# Patient Record
Sex: MALE | Race: BLACK OR AFRICAN AMERICAN | Employment: UNEMPLOYED | URBAN - METROPOLITAN AREA
[De-identification: names, ages, dates, MRNs, and addresses within clinical notes are randomized per-mention and may not be internally consistent; named-entity substitution may affect disease eponyms.]

---

## 2020-03-07 ENCOUNTER — HOSPITAL ENCOUNTER (EMERGENCY)
Age: 55
Discharge: HOME | End: 2020-03-07
Attending: EMERGENCY MEDICINE
Payer: MEDICARE

## 2020-03-07 VITALS
HEART RATE: 87 BPM | SYSTOLIC BLOOD PRESSURE: 124 MMHG | TEMPERATURE: 97.3 F | DIASTOLIC BLOOD PRESSURE: 82 MMHG | RESPIRATION RATE: 16 BRPM | OXYGEN SATURATION: 100 %

## 2020-03-07 PROCEDURE — 99283 EMERGENCY DEPT VISIT LOW MDM: CPT

## 2020-03-07 NOTE — ED PROVIDER NOTES
[facility] ED  Emergency Department Encounter  EmergencyMedicine Resident     Pt Nayan Allen  MRN: [id number]  [place] 1965  Date of evaluation: 3/7/20  PCP:  No primary care provider on file. CHIEF COMPLAINT       Chief Complaint   Patient presents with    Other     Pt to ED room 3 with c/o wanting ring cut off finger. Pt states he has been trying for a year to remove the ring. HISTORY OF PRESENT ILLNESS  (Location/Symptom, Timing/Onset, Context/Setting, Quality, Duration, Modifying Factors, Severity.)      Romero Taylor is a 48 y.o. male who presents with encounter for ring removal.  Patient has 2 rings on his left ring finger that he has been unable to remove for a year. No worsening pain today intact sensation of the finger, patient presents only to have her rings removed. No other complaints. PAST MEDICAL / SURGICAL / SOCIAL / FAMILY HISTORY      has a past medical history of Fatty liver and GERD (gastroesophageal reflux disease). has a past surgical history that includes Hand surgery and Urethra surgery.     Social History     Socioeconomic History    Marital status:      Spouse name: Not on file    Number of children: Not on file    Years of education: Not on file    Highest education level: Not on file   Occupational History    Not on file   Social Needs    Financial resource strain: Not on file    Food insecurity     Worry: Not on file     Inability: Not on file    Transportation needs     Medical: Not on file     Non-medical: Not on file   Tobacco Use    Smoking status: Current Every Day Smoker     Packs/day: 1.00     Years: 25.00     Pack years: 25.00     Types: Cigarettes    Smokeless tobacco: Never Used   Substance and Sexual Activity    Alcohol use: No     Alcohol/week: 0.0 standard drinks    Drug use: No    Sexual activity: Never   Lifestyle    Physical activity     Days per week: Not on file     Minutes per session: Not on file  Stress: Not on file   Relationships    Social connections     Talks on phone: Not on file     Gets together: Not on file     Attends [employer] service: Not on file     Active member of club or organization: Not on file     Attends meetings of clubs or organizations: Not on file     Relationship status: Not on file    Intimate partner violence     Fear of current or ex partner: Not on file     Emotionally abused: Not on file     Physically abused: Not on file     Forced sexual activity: Not on file   Other Topics Concern    Not on file   Social History Narrative    Not on file       No family history on file. Allergies:  Aspirin and Ciprofloxacin    Home Medications:  Prior to Admission medications    Medication Sig Start Date End Date Taking? Authorizing Provider   ondansetron (ZOFRAN ODT) 4 MG disintegrating tablet Take 1 tablet by mouth every 8 hours as needed for Nausea 10/18/16   Marilyn Fox DO   omeprazole (PRILOSEC) 20 MG capsule Take 1 capsule by mouth Daily 5/5/16   Kristy Delgado MD       REVIEW OF SYSTEMS    (2-9 systems for level 4, 10 or more for level 5)      Review of Systems   Constitutional: Negative. Musculoskeletal:        Left ring finger pain due to stuck rings. PHYSICAL EXAM   (up to 7 for level 4, 8 or more for level 5)      INITIAL VITALS:   /82   Pulse 87   Temp 97.3 °F (36.3 °C)   Resp 16   SpO2 100%     Physical Exam  Constitutional:       Appearance: Normal appearance. Cardiovascular:      Rate and Rhythm: Normal rate. Pulmonary:      Effort: No respiratory distress. Musculoskeletal:      Comments: Left hand: Patient has 2 steel steel rings that he is unable to remove. The rings are loose but he is unable to advance them beyond his PIP joint requesting removal today. Intact sensation and capillary refill distal finger. Neurological:      Mental Status: He is alert. DIFFERENTIAL  DIAGNOSIS     PLAN (LABS / IMAGING / EKG):   No orders of the defined types were placed in this encounter. MEDICATIONS ORDERED:  No orders of the defined types were placed in this encounter. DIAGNOSTIC RESULTS / EMERGENCY DEPARTMENT COURSE / MDM     LABS:  No results found for this visit on 03/07/20. RADIOLOGY:  None    EKG  None    All EKG's are interpreted by the Emergency Department Physician who either signs or Co-signs this chart in the absence of a cardiologist.    EMERGENCY DEPARTMENT COURSE:  Patient is a 53-year-old male here for ring removal.  Rings have been stuck on his left ring finger for over 1 year. No pain rings are loose he has been unable to cut them or remove them at home presents today for removal.  Otherwise patient has no other complaints. Rings were removed with ring cutter without difficulty. PROCEDURES:  Ring removal procedure    Indication: Rings able to be removed by patient. Procedure: The patient was placed in the appropriate position and anesthesia not needed for this procedure, utilizing ring cutting kit with appropriate techniques both rings were removed from patient's left ring finger. The patient tolerated the procedure well. Complications: None        CONSULTS:  None    CRITICAL CARE:  None    FINAL IMPRESSION      1.  Finger pain, left          DISPOSITION / PLAN     DISPOSITION  dc      PATIENT REFERRED TO:  [facility]  [address] [address]  [phone]    If symptoms worsen      DISCHARGE MEDICATIONS:  New Prescriptions    No medications on file       Shanti Kowalski DO  Emergency Medicine Resident    (Please note that portions of thisnote were completed with a voice recognition program.  Efforts were made to edit the dictations but occasionally words are mis-transcribed.)        Shanti Kowalski DO  03/07/20 1021

## 2020-10-28 ENCOUNTER — HOSPITAL ENCOUNTER (OUTPATIENT)
Age: 55
Setting detail: SPECIMEN
Discharge: HOME | End: 2020-10-28
Payer: MEDICARE

## 2020-10-28 LAB
ABSOLUTE EOS #: 0.22 K/UL (ref 0–0.44)
ABSOLUTE IMMATURE GRANULOCYTE: <0.03 K/UL (ref 0–0.3)
ABSOLUTE LYMPH #: 3.41 K/UL (ref 1.1–3.7)
ABSOLUTE MONO #: 0.95 K/UL (ref 0.1–1.2)
ALBUMIN SERPL-MCNC: 4.2 G/DL (ref 3.5–5.2)
ALBUMIN/GLOBULIN RATIO: 1.4 (ref 1–2.5)
ALP BLD-CCNC: 97 U/L (ref 40–129)
ALT SERPL-CCNC: 18 U/L (ref 5–41)
ANION GAP SERPL CALCULATED.3IONS-SCNC: 11 MMOL/L (ref 9–17)
AST SERPL-CCNC: 23 U/L
BASOPHILS # BLD: 1 % (ref 0–2)
BASOPHILS ABSOLUTE: 0.07 K/UL (ref 0–0.2)
BILIRUB SERPL-MCNC: 0.44 MG/DL (ref 0.3–1.2)
BUN BLDV-MCNC: 12 MG/DL (ref 6–20)
BUN/CREAT BLD: NORMAL (ref 9–20)
CALCIUM SERPL-MCNC: 9.8 MG/DL (ref 8.6–10.4)
CHLORIDE BLD-SCNC: 105 MMOL/L (ref 98–107)
CHOLESTEROL/HDL RATIO: 3.4
CHOLESTEROL: 158 MG/DL
CO2: 23 MMOL/L (ref 20–31)
CREAT SERPL-MCNC: 1.06 MG/DL (ref 0.7–1.2)
DIFFERENTIAL TYPE: NORMAL
EOSINOPHILS RELATIVE PERCENT: 2 % (ref 1–4)
ESTIMATED AVERAGE GLUCOSE: 128 MG/DL
GFR AFRICAN AMERICAN: >60 ML/MIN
GFR NON-AFRICAN AMERICAN: >60 ML/MIN
GFR SERPL CREATININE-BSD FRML MDRD: NORMAL ML/MIN/{1.73_M2}
GFR SERPL CREATININE-BSD FRML MDRD: NORMAL ML/MIN/{1.73_M2}
GLUCOSE BLD-MCNC: 90 MG/DL (ref 70–99)
HBA1C MFR BLD: 6.1 % (ref 4–6)
HCT VFR BLD CALC: 44.2 % (ref 40.7–50.3)
HDLC SERPL-MCNC: 46 MG/DL
HEMOGLOBIN: 14.3 G/DL (ref 13–17)
IMMATURE GRANULOCYTES: 0 %
LDL CHOLESTEROL: 95 MG/DL (ref 0–130)
LYMPHOCYTES # BLD: 36 % (ref 24–43)
MCH RBC QN AUTO: 27.5 PG (ref 25.2–33.5)
MCHC RBC AUTO-ENTMCNC: 32.4 G/DL (ref 28.4–34.8)
MCV RBC AUTO: 85 FL (ref 82.6–102.9)
MONOCYTES # BLD: 10 % (ref 3–12)
NRBC AUTOMATED: 0 PER 100 WBC
PDW BLD-RTO: 13.7 % (ref 11.8–14.4)
PLATELET # BLD: 413 K/UL (ref 138–453)
PLATELET ESTIMATE: NORMAL
PMV BLD AUTO: 9.7 FL (ref 8.1–13.5)
POTASSIUM SERPL-SCNC: 4.7 MMOL/L (ref 3.7–5.3)
PROSTATE SPECIFIC ANTIGEN: 0.92 UG/L
RBC # BLD: 5.2 M/UL (ref 4.21–5.77)
RBC # BLD: NORMAL 10*6/UL
SEG NEUTROPHILS: 51 % (ref 36–65)
SEGMENTED NEUTROPHILS ABSOLUTE COUNT: 4.92 K/UL (ref 1.5–8.1)
SODIUM BLD-SCNC: 139 MMOL/L (ref 135–144)
TOTAL PROTEIN: 7.3 G/DL (ref 6.4–8.3)
TRIGL SERPL-MCNC: 85 MG/DL
TSH SERPL DL<=0.05 MIU/L-ACNC: 2.26 MIU/L (ref 0.3–5)
VITAMIN D 25-HYDROXY: 41.8 NG/ML (ref 30–100)
VLDLC SERPL CALC-MCNC: NORMAL MG/DL (ref 1–30)
WBC # BLD: 9.6 K/UL (ref 3.5–11.3)
WBC # BLD: NORMAL 10*3/UL

## 2020-10-29 ENCOUNTER — HOSPITAL ENCOUNTER (OUTPATIENT)
Age: 55
Setting detail: SPECIMEN
Discharge: HOME | End: 2020-10-29
Payer: MEDICARE

## 2020-10-29 LAB
DATE, STOOL #1: NORMAL
DATE, STOOL #2: NORMAL
DATE, STOOL #3: NORMAL
HEMOCCULT SP1 STL QL: NEGATIVE
HEMOCCULT SP2 STL QL: NEGATIVE
HEMOCCULT SP3 STL QL: NEGATIVE
TIME, STOOL #1: NORMAL
TIME, STOOL #2: NORMAL
TIME, STOOL #3: NORMAL

## 2021-03-20 ENCOUNTER — APPOINTMENT (OUTPATIENT)
Dept: GENERAL RADIOLOGY | Age: 56
End: 2021-03-20
Payer: MEDICARE

## 2021-03-20 ENCOUNTER — HOSPITAL ENCOUNTER (EMERGENCY)
Age: 56
Discharge: HOME | End: 2021-03-20
Attending: EMERGENCY MEDICINE
Payer: MEDICARE

## 2021-03-20 VITALS
OXYGEN SATURATION: 96 % | DIASTOLIC BLOOD PRESSURE: 80 MMHG | RESPIRATION RATE: 14 BRPM | TEMPERATURE: 97.7 F | SYSTOLIC BLOOD PRESSURE: 114 MMHG | HEART RATE: 74 BPM

## 2021-03-20 DIAGNOSIS — R07.9 CHEST PAIN, UNSPECIFIED TYPE: Primary | ICD-10-CM

## 2021-03-20 LAB
ABSOLUTE EOS #: 0.11 K/UL (ref 0–0.4)
ABSOLUTE IMMATURE GRANULOCYTE: 0 K/UL (ref 0–0.3)
ABSOLUTE LYMPH #: 5.28 K/UL (ref 1–4.8)
ABSOLUTE MONO #: 0.33 K/UL (ref 0.1–0.8)
ANION GAP SERPL CALCULATED.3IONS-SCNC: 14 MMOL/L (ref 9–17)
ATYPICAL LYMPHOCYTE ABSOLUTE COUNT: 0.22 K/UL
ATYPICAL LYMPHOCYTES: 2 %
BASOPHILS # BLD: 0 % (ref 0–2)
BASOPHILS ABSOLUTE: 0 K/UL (ref 0–0.2)
BUN BLDV-MCNC: 16 MG/DL (ref 6–20)
BUN/CREAT BLD: NORMAL (ref 9–20)
CALCIUM SERPL-MCNC: 9.7 MG/DL (ref 8.6–10.4)
CHLORIDE BLD-SCNC: 103 MMOL/L (ref 98–107)
CO2: 23 MMOL/L (ref 20–31)
CREAT SERPL-MCNC: 0.93 MG/DL (ref 0.7–1.2)
DIFFERENTIAL TYPE: ABNORMAL
EOSINOPHILS RELATIVE PERCENT: 1 % (ref 1–4)
GFR AFRICAN AMERICAN: >60 ML/MIN
GFR NON-AFRICAN AMERICAN: >60 ML/MIN
GFR SERPL CREATININE-BSD FRML MDRD: NORMAL ML/MIN/{1.73_M2}
GFR SERPL CREATININE-BSD FRML MDRD: NORMAL ML/MIN/{1.73_M2}
GLUCOSE BLD-MCNC: 94 MG/DL (ref 70–99)
HCT VFR BLD CALC: 47.5 % (ref 40.7–50.3)
HEMOGLOBIN: 15.3 G/DL (ref 13–17)
IMMATURE GRANULOCYTES: 0 %
LYMPHOCYTES # BLD: 48 % (ref 24–44)
MCH RBC QN AUTO: 27.8 PG (ref 25.2–33.5)
MCHC RBC AUTO-ENTMCNC: 32.2 G/DL (ref 28.4–34.8)
MCV RBC AUTO: 86.2 FL (ref 82.6–102.9)
MONOCYTES # BLD: 3 % (ref 1–7)
MORPHOLOGY: NORMAL
NRBC AUTOMATED: 0 PER 100 WBC
PDW BLD-RTO: 13.4 % (ref 11.8–14.4)
PLATELET # BLD: ABNORMAL K/UL (ref 138–453)
PLATELET ESTIMATE: ABNORMAL
PLATELET, FLUORESCENCE: NORMAL K/UL (ref 138–453)
PMV BLD AUTO: ABNORMAL FL (ref 8.1–13.5)
POTASSIUM SERPL-SCNC: 4.1 MMOL/L (ref 3.7–5.3)
RBC # BLD: 5.51 M/UL (ref 4.21–5.77)
RBC # BLD: ABNORMAL 10*6/UL
SEG NEUTROPHILS: 46 % (ref 36–66)
SEGMENTED NEUTROPHILS ABSOLUTE COUNT: 5.06 K/UL (ref 1.8–7.7)
SODIUM BLD-SCNC: 140 MMOL/L (ref 135–144)
TROPONIN INTERP: NORMAL
TROPONIN INTERP: NORMAL
TROPONIN T: NORMAL NG/ML
TROPONIN T: NORMAL NG/ML
TROPONIN, HIGH SENSITIVITY: 6 NG/L (ref 0–22)
TROPONIN, HIGH SENSITIVITY: 7 NG/L (ref 0–22)
WBC # BLD: 11 K/UL (ref 3.5–11.3)
WBC # BLD: ABNORMAL 10*3/UL

## 2021-03-20 PROCEDURE — 85055 RETICULATED PLATELET ASSAY: CPT

## 2021-03-20 PROCEDURE — 84484 ASSAY OF TROPONIN QUANT: CPT

## 2021-03-20 PROCEDURE — 85025 COMPLETE CBC W/AUTO DIFF WBC: CPT

## 2021-03-20 PROCEDURE — 6360000002 HC RX W HCPCS: Performed by: STUDENT IN AN ORGANIZED HEALTH CARE EDUCATION/TRAINING PROGRAM

## 2021-03-20 PROCEDURE — 80048 BASIC METABOLIC PNL TOTAL CA: CPT

## 2021-03-20 PROCEDURE — 93005 ELECTROCARDIOGRAM TRACING: CPT | Performed by: STUDENT IN AN ORGANIZED HEALTH CARE EDUCATION/TRAINING PROGRAM

## 2021-03-20 PROCEDURE — 71046 X-RAY EXAM CHEST 2 VIEWS: CPT

## 2021-03-20 PROCEDURE — 99283 EMERGENCY DEPT VISIT LOW MDM: CPT

## 2021-03-20 PROCEDURE — 96374 THER/PROPH/DIAG INJ IV PUSH: CPT

## 2021-03-20 RX ORDER — KETOROLAC TROMETHAMINE 15 MG/ML
15 INJECTION, SOLUTION INTRAMUSCULAR; INTRAVENOUS ONCE
Status: COMPLETED | OUTPATIENT
Start: 2021-03-20 | End: 2021-03-20

## 2021-03-20 RX ADMIN — KETOROLAC TROMETHAMINE 15 MG: 15 INJECTION, SOLUTION INTRAMUSCULAR; INTRAVENOUS at 03:37

## 2021-03-20 ASSESSMENT — PAIN SCALES - GENERAL: PAINLEVEL_OUTOF10: 5

## 2021-03-20 ASSESSMENT — ENCOUNTER SYMPTOMS
RHINORRHEA: 0
COUGH: 0
ABDOMINAL PAIN: 0
EYE PAIN: 0
DIARRHEA: 0
SHORTNESS OF BREATH: 1
EYE DISCHARGE: 0
NAUSEA: 0
VOMITING: 0
CONSTIPATION: 0
CHEST TIGHTNESS: 1

## 2021-03-20 ASSESSMENT — PAIN DESCRIPTION - DESCRIPTORS: DESCRIPTORS: SORE

## 2021-03-20 ASSESSMENT — PAIN DESCRIPTION - PAIN TYPE: TYPE: ACUTE PAIN

## 2021-03-20 NOTE — ED NOTES
Writer called cab for transportation back to [place] [address] per patient request utilizing [employer] due to [employer].       Carolee Manrique, TIESHA  03/20/21 6610

## 2021-03-20 NOTE — ED NOTES
Bed: 16  Expected date: 3/20/21  Expected time: 1:04 AM  Means of arrival: [employer]  Comments:  MADISON Boucher 1958, RN  03/20/21 8977

## 2021-03-20 NOTE — ED PROVIDER NOTES
[facility] [facility]  Emergency Department  Faculty Attestation       I performed a history and physical examination of the patient and discussed management with the resident. I reviewed the residents note and agree with the documented findings including all diagnostic interpretations and plan of care. Any areas of disagreement are noted on the chart. I was personally present for the key portions of any procedures. I have documented in the chart those procedures where I was not present during the key portions. I have reviewed the emergency nurses triage note. I agree with the chief complaint, past medical history, past surgical history, allergies, medications, social and family history as documented unless otherwise noted below. Documentation of the HPI, Physical Exam and Medical Decision Making performed by scribmartha is based on my personal performance of the HPI, PE and MDM. For Physician Assistant/ Nurse Practitioner cases/documentation I have personally evaluated this patient and have completed at least one if not all key elements of the E/M (history, physical exam, and MDM). Additional findings are as noted. Pertinent Comments     Primary Care Physician: No primary care provider on file. ED Triage Vitals   BP Temp Temp Source Pulse Resp SpO2 Height Weight   03/20/21 0111 03/20/21 0111 03/20/21 0111 03/20/21 0111 03/20/21 0111 03/20/21 0112 -- --   114/80 97.7 °F (36.5 °C) Oral 74 14 96 %          History/Physical: This is a 50 y.o. male who presents to the Emergency Department with complaint of a pinching chest pain sensation, associate with some shortness of breath, no other complaints at this time. Now just feels sore. On exam well-appearing in no significant distress. NC/AT. Heart sounds regular with no rubs murmurs or gallops. Lungs clear to auscultiation bilaterally. Abdomen soft nontender. Alert and oriented moving all extremities equally. No extremity edema.     MDM/Plan: Chest pain. Cardiac work-up, and likely discharge home if work-up negative given low heart score.     EKG Interpretation    Interpreted by emergency department physician    Rhythm: normal sinus  Rate: normal  Axis: normal  Ectopy: none  Conduction: normal  ST Segments: normal, no acute infarction evident  T Waves: normal  Q Waves: none    EKG Interpretation:  Normal EKG      CRITICAL CARE: None     Nelsy Diehl MD  Attending Emergency Physician         Nelsy Diehl MD  03/23/21 7711

## 2021-03-20 NOTE — ED PROVIDER NOTES
Start Date End Date Taking? Authorizing Provider   ondansetron (ZOFRAN ODT) 4 MG disintegrating tablet Take 1 tablet by mouth every 8 hours as needed for Nausea 10/18/16   Ute Lemus DO   omeprazole (PRILOSEC) 20 MG capsule Take 1 capsule by mouth Daily 5/5/16   Yumiko Gurrola MD       REVIEW OFSYSTEMS    (2-9 systems for level 4, 10 or more for level 5)      Review of Systems   Constitutional: Negative for chills and fever. HENT: Negative for congestion, ear pain and rhinorrhea. Eyes: Negative for pain and discharge. Respiratory: Positive for chest tightness and shortness of breath. Negative for cough. Cardiovascular: Positive for chest pain. Negative for palpitations. Gastrointestinal: Negative for abdominal pain, constipation, diarrhea, nausea and vomiting. Genitourinary: Negative for difficulty urinating and hematuria. Musculoskeletal: Negative for arthralgias and myalgias. Skin: Negative for rash and wound. Neurological: Positive for light-headedness. Negative for headaches. PHYSICAL EXAM   (up to 7 for level 4, 8 or more forlevel 5)      INITIAL VITALS:   Vitals:    03/20/21 0112   BP:    Pulse:    Resp:    Temp:    SpO2: 96%        Physical Exam  Vitals signs and nursing note reviewed. Constitutional:       General: He is not in acute distress. Appearance: He is not ill-appearing. HENT:      Head: Normocephalic and atraumatic. Nose: Nose normal.      Mouth/Throat:      Mouth: Mucous membranes are moist.      Pharynx: Oropharynx is clear. Eyes:      Pupils: Pupils are equal, round, and reactive to light. Neck:      Musculoskeletal: Normal range of motion. Cardiovascular:      Rate and Rhythm: Normal rate and regular rhythm. Heart sounds: No murmur. No friction rub. No gallop. Pulmonary:      Effort: Pulmonary effort is normal. No respiratory distress. Breath sounds: Normal breath sounds. No decreased breath sounds, wheezing or rhonchi. Chest:      Chest wall: No mass, deformity or tenderness. Abdominal:      General: Abdomen is flat. There is no distension. Palpations: Abdomen is soft. Tenderness: There is no abdominal tenderness. Musculoskeletal:      Right lower leg: No edema. Left lower leg: No edema. Skin:     General: Skin is warm and dry. Neurological:      Mental Status: He is alert and oriented to person, place, and time. Psychiatric:         Mood and Affect: Mood normal.         Behavior: Behavior normal.         DIFFERENTIAL  DIAGNOSIS       DDX: ACS versus NSTEMI versus STEMI versus URI versus COPD exacerbation versus GERD    Initial MDM/Plan: 50 y.o. male who presents with acute onset of chest pain or shortness of breath. GCS 15, vital signs stable upon arrival.  Remained stable throughout emergency department stay. Did receive 3 and 24 mg of aspirin in route per EMS. No other medications given. CBC showing no acute abnormalities, BMP showing no acute abnormalities, chest x-ray showing COPD and emphysema with known smoking history. Initial troponin 6. Given toradol for symptomatic relief. Repeat troponin 7. Heart score of 2. Patient remained vitally stable throughout emergency department stay was discharged in stable condition with plan for follow-up with primary care physician. Patient does state that he is primary care physician and does not need referral.    DIAGNOSTIC RESULTS / EMERGENCYDEPARTMENT COURSE / MDM     LABS:  Labs Reviewed   CBC WITH AUTO DIFFERENTIAL - Abnormal; Notable for the following components:       Result Value    Lymphocytes 48 (*)     Absolute Lymph # 5.28 (*)     All other components within normal limits   BASIC METABOLIC PANEL W/ REFLEX TO MG FOR LOW K   TROPONIN   TROPONIN   IMMATURE PLATELET FRACTION         RADIOLOGY:  Xr Chest (2 Vw)    Result Date: 3/20/2021  EXAMINATION: TWO XRAY VIEWS OF THE CHEST 3/20/2021 2:04 am COMPARISON: Chest two views July 6, 2016.  HISTORY: ORDERING SYSTEM PROVIDED HISTORY: chest pain and shortness of breath TECHNOLOGIST PROVIDED HISTORY: chest pain and shortness of breath Reason for Exam: CP.SOB FINDINGS: The heart is normal in size and configuration. The mediastinal contours are within normal limits. The lungs are well aerated. Diaphragm is flattened with mild increased AP diameter of the chest.  The pleural surfaces are normal and no evidence of a pleural effusion is seen. Bones and soft tissues are unremarkable. COPD/emphysema. No evidence of lung consolidation. EKG  Rate normal, normal sinus rhythm, Axis normal, no deviation noted, intervals within normal limits including AL, QRS, QT/QTc, no ST segment elevation, no ST segment depression, no T wave inversion noted. No acute abnormalities noted on EKG. All EKG's are interpreted by the Emergency Department Physicianwho either signs or Co-signs this chart in the absence of a cardiologist.    EMERGENCY DEPARTMENT COURSE:          PROCEDURES:  None    CONSULTS:  None      FINAL IMPRESSION      1.  Chest pain, unspecified type          DISPOSITION / PLAN     DISPOSITION        PATIENT REFERRED TO:  [facility]  [address] [address]  [phone]    As needed, If symptoms worsen    [address] [address] [phone]    As needed if you need to establish care      DISCHARGE MEDICATIONS:  Discharge Medication List as of 3/20/2021  4:27 AM          Marjorie Hensley DO  Emergency Medicine Resident    (Please note that portions of this note were completed with a voice recognition program.Efforts were made to edit the dictations but occasionally words are mis-transcribed.)       Marjorie Hensley DO  Resident  03/20/21 4783

## 2021-03-22 LAB
EKG ATRIAL RATE: 83 BPM
EKG P AXIS: 84 DEGREES
EKG P-R INTERVAL: 152 MS
EKG Q-T INTERVAL: 380 MS
EKG QRS DURATION: 88 MS
EKG QTC CALCULATION (BAZETT): 446 MS
EKG R AXIS: 88 DEGREES
EKG T AXIS: 64 DEGREES
EKG VENTRICULAR RATE: 83 BPM

## 2021-03-22 PROCEDURE — 93010 ELECTROCARDIOGRAM REPORT: CPT | Performed by: INTERNAL MEDICINE

## 2021-04-09 ENCOUNTER — HOSPITAL ENCOUNTER (EMERGENCY)
Age: 56
Discharge: LEFT BEFORE DISCHARGE | End: 2021-04-09
Payer: MEDICARE

## 2023-01-24 ENCOUNTER — HOSPITAL ENCOUNTER (EMERGENCY)
Age: 58
Discharge: HOME | End: 2023-01-24
Attending: EMERGENCY MEDICINE
Payer: MEDICARE

## 2023-01-24 ENCOUNTER — APPOINTMENT (OUTPATIENT)
Dept: GENERAL RADIOLOGY | Age: 58
End: 2023-01-24
Payer: MEDICARE

## 2023-01-24 ENCOUNTER — APPOINTMENT (OUTPATIENT)
Dept: CT IMAGING | Age: 58
End: 2023-01-24
Payer: MEDICARE

## 2023-01-24 VITALS
DIASTOLIC BLOOD PRESSURE: 80 MMHG | HEIGHT: 69 IN | OXYGEN SATURATION: 92 % | BODY MASS INDEX: 27.32 KG/M2 | TEMPERATURE: 97.5 F | SYSTOLIC BLOOD PRESSURE: 124 MMHG | HEART RATE: 83 BPM | RESPIRATION RATE: 20 BRPM

## 2023-01-24 DIAGNOSIS — R07.89 CHEST WALL PAIN: ICD-10-CM

## 2023-01-24 DIAGNOSIS — R10.13 ABDOMINAL PAIN, EPIGASTRIC: Primary | ICD-10-CM

## 2023-01-24 LAB
ABSOLUTE EOS #: 0.11 K/UL (ref 0–0.44)
ABSOLUTE IMMATURE GRANULOCYTE: 0.03 K/UL (ref 0–0.3)
ABSOLUTE LYMPH #: 2.38 K/UL (ref 1.1–3.7)
ABSOLUTE MONO #: 0.85 K/UL (ref 0.1–1.2)
ALBUMIN SERPL-MCNC: 4.4 G/DL (ref 3.5–5.2)
ALBUMIN/GLOBULIN RATIO: 1.4 (ref 1–2.5)
ALP BLD-CCNC: 95 U/L (ref 40–129)
ALT SERPL-CCNC: 16 U/L (ref 5–41)
ANION GAP SERPL CALCULATED.3IONS-SCNC: 12 MMOL/L (ref 9–17)
AST SERPL-CCNC: 15 U/L
BASOPHILS # BLD: 1 % (ref 0–2)
BASOPHILS ABSOLUTE: 0.07 K/UL (ref 0–0.2)
BILIRUB SERPL-MCNC: 0.5 MG/DL (ref 0.3–1.2)
BILIRUBIN DIRECT: 0.1 MG/DL
BILIRUBIN, INDIRECT: 0.4 MG/DL (ref 0–1)
BUN BLDV-MCNC: 14 MG/DL (ref 6–20)
CALCIUM SERPL-MCNC: 9.9 MG/DL (ref 8.6–10.4)
CHLORIDE BLD-SCNC: 100 MMOL/L (ref 98–107)
CO2: 24 MMOL/L (ref 20–31)
CREAT SERPL-MCNC: 1.09 MG/DL (ref 0.7–1.2)
EKG ATRIAL RATE: 84 BPM
EKG P AXIS: 82 DEGREES
EKG P-R INTERVAL: 156 MS
EKG Q-T INTERVAL: 378 MS
EKG QRS DURATION: 80 MS
EKG QTC CALCULATION (BAZETT): 446 MS
EKG R AXIS: 86 DEGREES
EKG T AXIS: 68 DEGREES
EKG VENTRICULAR RATE: 84 BPM
EOSINOPHILS RELATIVE PERCENT: 1 % (ref 1–4)
GFR SERPL CREATININE-BSD FRML MDRD: >60 ML/MIN/1.73M2
GLUCOSE BLD-MCNC: 128 MG/DL (ref 70–99)
HCT VFR BLD CALC: 49.4 % (ref 40.7–50.3)
HEMOGLOBIN: 16.1 G/DL (ref 13–17)
IMMATURE GRANULOCYTES: 0 %
LIPASE: 48 U/L (ref 13–60)
LYMPHOCYTES # BLD: 22 % (ref 24–43)
MAGNESIUM: 2 MG/DL (ref 1.6–2.6)
MCH RBC QN AUTO: 27.9 PG (ref 25.2–33.5)
MCHC RBC AUTO-ENTMCNC: 32.6 G/DL (ref 28.4–34.8)
MCV RBC AUTO: 85.6 FL (ref 82.6–102.9)
MONOCYTES # BLD: 8 % (ref 3–12)
NRBC AUTOMATED: 0 PER 100 WBC
PDW BLD-RTO: 13.8 % (ref 11.8–14.4)
PLATELET # BLD: 437 K/UL (ref 138–453)
PMV BLD AUTO: 9.1 FL (ref 8.1–13.5)
POTASSIUM SERPL-SCNC: 5.1 MMOL/L (ref 3.7–5.3)
PRO-BNP: <36 PG/ML
RBC # BLD: 5.77 M/UL (ref 4.21–5.77)
SEG NEUTROPHILS: 68 % (ref 36–65)
SEGMENTED NEUTROPHILS ABSOLUTE COUNT: 7.33 K/UL (ref 1.5–8.1)
SODIUM BLD-SCNC: 136 MMOL/L (ref 135–144)
TOTAL PROTEIN: 7.6 G/DL (ref 6.4–8.3)
TROPONIN, HIGH SENSITIVITY: 9 NG/L (ref 0–22)
TROPONIN, HIGH SENSITIVITY: 9 NG/L (ref 0–22)
WBC # BLD: 10.8 K/UL (ref 3.5–11.3)

## 2023-01-24 PROCEDURE — 6360000004 HC RX CONTRAST MEDICATION: Performed by: STUDENT IN AN ORGANIZED HEALTH CARE EDUCATION/TRAINING PROGRAM

## 2023-01-24 PROCEDURE — A4216 STERILE WATER/SALINE, 10 ML: HCPCS | Performed by: STUDENT IN AN ORGANIZED HEALTH CARE EDUCATION/TRAINING PROGRAM

## 2023-01-24 PROCEDURE — 83690 ASSAY OF LIPASE: CPT

## 2023-01-24 PROCEDURE — 83880 ASSAY OF NATRIURETIC PEPTIDE: CPT

## 2023-01-24 PROCEDURE — 85025 COMPLETE CBC W/AUTO DIFF WBC: CPT

## 2023-01-24 PROCEDURE — 96375 TX/PRO/DX INJ NEW DRUG ADDON: CPT

## 2023-01-24 PROCEDURE — 80076 HEPATIC FUNCTION PANEL: CPT

## 2023-01-24 PROCEDURE — 74177 CT ABD & PELVIS W/CONTRAST: CPT

## 2023-01-24 PROCEDURE — 2580000003 HC RX 258: Performed by: STUDENT IN AN ORGANIZED HEALTH CARE EDUCATION/TRAINING PROGRAM

## 2023-01-24 PROCEDURE — 96374 THER/PROPH/DIAG INJ IV PUSH: CPT

## 2023-01-24 PROCEDURE — 96361 HYDRATE IV INFUSION ADD-ON: CPT

## 2023-01-24 PROCEDURE — 84484 ASSAY OF TROPONIN QUANT: CPT

## 2023-01-24 PROCEDURE — 80048 BASIC METABOLIC PNL TOTAL CA: CPT

## 2023-01-24 PROCEDURE — 99285 EMERGENCY DEPT VISIT HI MDM: CPT

## 2023-01-24 PROCEDURE — 2500000003 HC RX 250 WO HCPCS: Performed by: STUDENT IN AN ORGANIZED HEALTH CARE EDUCATION/TRAINING PROGRAM

## 2023-01-24 PROCEDURE — 6360000002 HC RX W HCPCS: Performed by: STUDENT IN AN ORGANIZED HEALTH CARE EDUCATION/TRAINING PROGRAM

## 2023-01-24 PROCEDURE — 93010 ELECTROCARDIOGRAM REPORT: CPT | Performed by: INTERNAL MEDICINE

## 2023-01-24 PROCEDURE — 93005 ELECTROCARDIOGRAM TRACING: CPT | Performed by: STUDENT IN AN ORGANIZED HEALTH CARE EDUCATION/TRAINING PROGRAM

## 2023-01-24 PROCEDURE — 71046 X-RAY EXAM CHEST 2 VIEWS: CPT

## 2023-01-24 PROCEDURE — 83735 ASSAY OF MAGNESIUM: CPT

## 2023-01-24 RX ORDER — ONDANSETRON 2 MG/ML
4 INJECTION INTRAMUSCULAR; INTRAVENOUS ONCE
Status: COMPLETED | OUTPATIENT
Start: 2023-01-24 | End: 2023-01-24

## 2023-01-24 RX ORDER — 0.9 % SODIUM CHLORIDE 0.9 %
1000 INTRAVENOUS SOLUTION INTRAVENOUS ONCE
Status: COMPLETED | OUTPATIENT
Start: 2023-01-24 | End: 2023-01-24

## 2023-01-24 RX ADMIN — ONDANSETRON 4 MG: 2 INJECTION INTRAMUSCULAR; INTRAVENOUS at 03:46

## 2023-01-24 RX ADMIN — SODIUM CHLORIDE 1000 ML: 9 INJECTION, SOLUTION INTRAVENOUS at 03:58

## 2023-01-24 RX ADMIN — FAMOTIDINE 20 MG: 10 INJECTION, SOLUTION INTRAVENOUS at 03:57

## 2023-01-24 RX ADMIN — IOPAMIDOL 75 ML: 755 INJECTION, SOLUTION INTRAVENOUS at 04:09

## 2023-01-24 ASSESSMENT — ENCOUNTER SYMPTOMS
CHOKING: 0
SHORTNESS OF BREATH: 1
NAUSEA: 1
BACK PAIN: 0
ABDOMINAL PAIN: 1
VOMITING: 1
CONSTIPATION: 1

## 2023-01-24 ASSESSMENT — PAIN SCALES - GENERAL: PAINLEVEL_OUTOF10: 5

## 2023-01-24 ASSESSMENT — PAIN DESCRIPTION - LOCATION: LOCATION: ABDOMEN

## 2023-01-24 NOTE — ED PROVIDER NOTES
[facility]     Emergency Department     Faculty Attestation    I performed a history and physical examination of the patient and discussed management with the resident. I have reviewed and agree with the residents findings including all diagnostic interpretations, and treatment plans as written. Any areas of disagreement are noted on the chart. I was personally present for the key portions of any procedures. I have documented in the chart those procedures where I was not present during the key portions. I have reviewed the emergency nurses triage note. I agree with the chief complaint, past medical history, past surgical history, allergies, medications, social and family history as documented unless otherwise noted below. Documentation of the HPI, Physical Exam and Medical Decision Making performed by scribmartha is based on my personal performance of the HPI, PE and MDM. For Physician Assistant/ Nurse Practitioner cases/documentation I have personally evaluated this patient and have completed at least one if not all key elements of the E/M (history, physical exam, and MDM). Additional findings are as noted. 57 yo M c/o upper abdominal pain, nausea & vomiting, c/o cp, no fever, no  injury,   PE vss gcs 15, neck supple, mild diffuse tenderness, no distension, no rigidity, no guarding, no rebound, no mass,     Ct abdomen -, cxr - eval stable, will plan to discharge,     EKG Interpretation    Interpreted by me  Normal sinus, heart rate 84, no ischemia, normal axis, QT corrected 446    CRITICAL CARE: There was a high probability of clinically significant/life threatening deterioration in this patient's condition which required my urgent intervention. Total critical care time was 5 minutes. This excludes any time for separately reportable procedures.        [facility],   01/24/23 1515 [facility],   01/24/23 022 Crystal Lopez DO  01/24/23 7154

## 2023-01-24 NOTE — ED NOTES
Pt requesting transportation assistance. Pt given option of calling a cab, but wanted a bus pass. Pt provided with two bus passes for transfer.       Erich Sawant, TIESHA  01/24/23 3707

## 2023-01-24 NOTE — ED PROVIDER NOTES
[facility]  ED  Emergency Department Encounter  Emergency Medicine Resident     Pt Name: Sushma Gao  MRN: [id number]  [place] 1965  Date of evaluation: 1/24/23  PCP:  No primary care provider on file. CHIEF COMPLAINT       Chief Complaint   Patient presents with    Abdominal Pain    Chest Pain       HISTORY OFPRESENT ILLNESS  (Location/Symptom, Timing/Onset, Context/Setting, Quality, Duration, Modifying Factors,Severity.)      Sushma Gao is a 56 y. o.yo male who is a history of diverticulosis, who presents with due to complaints of epigastric pain, chest pain but no shortness of breath. Patient states that he called EMS today due to recurrent vomiting and inability to tolerate any orals. Patient states that he has been having some weight loss, she reports about 20 pounds of weight loss that is unintentional within the past 2 months. He states that the pain that he has noticed is epigastric but radiates up to his chest.  He denies any history of this. He otherwise denies any fever, chills however he reports occasional night sweats. Patient denies any cough, myalgia. Patient reports that he does live in apartment however he is trying to find another place to move to. PAST MEDICAL / SURGICAL / SOCIAL / FAMILY HISTORY      has a past medical history of Fatty liver and GERD (gastroesophageal reflux disease). has a past surgical history that includes Hand surgery and Urethra surgery. Social History     Socioeconomic History    Marital status: Single     Spouse name: Not on file    Number of children: Not on file    Years of education: Not on file    Highest education level: Not on file   Occupational History    Not on file   Tobacco Use    Smoking status: Every Day     Packs/day: 1.00     Years: 25.00     Pack years: 25.00     Types: Cigarettes    Smokeless tobacco: Never   Substance and Sexual Activity    Alcohol use: No     Alcohol/week: 0.0 standard drinks    Drug use:  No Sexual activity: Never   Other Topics Concern    Not on file   Social History Narrative    Not on file     Social Determinants of Health     Financial Resource Strain: Not on file   Food Insecurity: Not on file   Transportation Needs: Not on file   Physical Activity: Not on file   Stress: Not on file   Social Connections: Not on file   Intimate Partner Violence: Not on file   Housing Stability: Not on file       No family history on file. Allergies:  Aspirin and Ciprofloxacin    Home Medications:  Prior to Admission medications    Medication Sig Start Date End Date Taking? Authorizing Provider   ondansetron (ZOFRAN ODT) 4 MG disintegrating tablet Take 1 tablet by mouth every 8 hours as needed for Nausea 10/18/16   Trell Holley DO   omeprazole (PRILOSEC) 20 MG capsule Take 1 capsule by mouth Daily 5/5/16   Brooks Chapa MD       REVIEW OFSYSTEMS    (2-9 systems for level 4, 10 or more for level 5)      Review of Systems   Constitutional:  Positive for appetite change, fatigue and unexpected weight change. Negative for diaphoresis. HENT:  Negative for congestion and drooling. Respiratory:  Positive for shortness of breath. Negative for choking. Cardiovascular:  Positive for chest pain. Gastrointestinal:  Positive for abdominal pain, constipation, nausea and vomiting. Genitourinary:  Negative for flank pain and hematuria. Musculoskeletal:  Negative for back pain and gait problem. Skin:  Negative for rash and wound. Neurological:  Negative for light-headedness and headaches. Psychiatric/Behavioral:  Negative for agitation and confusion. PHYSICAL EXAM   (up to 7 for level 4, 8 or more forlevel 5)      INITIAL VITALS:   ED Triage Vitals [01/24/23 0321]   BP Temp Temp Source Heart Rate Resp SpO2 Height Weight   117/78 97.5 °F (36.4 °C) Oral 83 20 100 % -- --       Physical Exam  Constitutional:       Appearance: He is well-developed. HENT:      Head: Normocephalic.       Nose: Nose normal.      Mouth/Throat:      Mouth: Mucous membranes are moist.   Eyes:      Extraocular Movements: Extraocular movements intact. Pupils: Pupils are equal, round, and reactive to light. Cardiovascular:      Rate and Rhythm: Normal rate and regular rhythm. Pulses: Normal pulses. Pulmonary:      Effort: Pulmonary effort is normal.      Breath sounds: No wheezing or rhonchi. Abdominal:      Tenderness: There is abdominal tenderness. There is no guarding or rebound. Hernia: No hernia is present. Comments: Epigastric tenderness on palpation, no rebound or guarding   Musculoskeletal:         General: No swelling or tenderness. Cervical back: Normal range of motion. No rigidity. Skin:     General: Skin is warm. Capillary Refill: Capillary refill takes less than 2 seconds. Coloration: Skin is not jaundiced. Neurological:      General: No focal deficit present. Mental Status: He is alert. Psychiatric:         Mood and Affect: Mood normal.         Behavior: Behavior normal.       DIFFERENTIAL  DIAGNOSIS     PLAN (LABS / IMAGING / EKG):  Orders Placed This Encounter   Procedures    XR CHEST (2 VW)    CT ABDOMEN PELVIS W IV CONTRAST Additional Contrast? None    CBC with Auto Differential    Magnesium    Brain Natriuretic Peptide    Troponin    Troponin    Lipase    Basic Metabolic Panel    Hepatic Function Panel    Cardiac Monitor - ED Only    Continuous Pulse Oximetry    EKG 12 Lead    Saline lock IV       MEDICATIONS ORDERED:  Orders Placed This Encounter   Medications    0.9 % sodium chloride bolus    ondansetron (ZOFRAN) injection 4 mg    famotidine (PEPCID) 20 mg in sodium chloride (PF) 0.9 % 10 mL injection    iopamidol (ISOVUE-370) 76 % injection 75 mL         Medical Decision Making  Patient who presents with complaints of chest pain and epigastric pain. He is in no acute distress, vitals reviewed they are within normal limits.   Abdomen soft however tender over the epigastric region, no rebound or guarding. Patient lungs clear to auscultate bilaterally. Impression is atypical presentation of ACS versus pancreatitis versus pneumothorax versus pneumonia versus small bowel obstruction although patient does not have any history of abdominal surgical procedures. He does have a history of diverticulosis however he is not complain of any rectal bleeding. Will get cardiac work-up, abdominal labs of lipase, LFTs. We will also get CT scan of abdomen. Given fluids, Zofran, Pepcid  Labs and imaging negative, will discharge with close PCP follow    Amount and/or Complexity of Data Reviewed  External Data Reviewed: labs. Labs: ordered. Radiology: ordered. ECG/medicine tests: ordered. Risk  Prescription drug management. Critical Care  Total time providing critical care: < 30 minutes     DIAGNOSTIC RESULTS / EMERGENCYDEPARTMENT COURSE / MDM     LABS:  Labs Reviewed   CBC WITH AUTO DIFFERENTIAL - Abnormal; Notable for the following components:       Result Value    Seg Neutrophils 68 (*)     Lymphocytes 22 (*)     All other components within normal limits   BASIC METABOLIC PANEL - Abnormal; Notable for the following components:    Glucose 128 (*)     All other components within normal limits   MAGNESIUM   BRAIN NATRIURETIC PEPTIDE   TROPONIN   TROPONIN   LIPASE   HEPATIC FUNCTION PANEL         RADIOLOGY:  No results found.       EKG  Normal EKG EKG Interpretation    Interpreted by me    Rhythm: normal sinus   Rate: normal  Axis: normal  Ectopy: none  Conduction: normal  ST Segments: no acute change  T Waves: t wave inversion in lead V1  Q Waves: none    Clinical Impression: t wave inversion in lead V1    All EKG's are interpreted by the Emergency Department Physicianwho either signs or Co-signs this chart in the absence of a cardiologist.    EMERGENCY DEPARTMENT COURSE:  ED Course as of 01/29/23 0928   Tue Jan 24, 2023   0437 Lipase within normal limits, electrolytes within normal limits. CXR negative for acute process. Initial troponin 9, will plan for repeat [AN]   0524 CT abdomen reviewed with no acute abnormality. Will await for troponin to result. Will plan to discharge patient. [AN]      ED Course User Index  [AN] Nya Penaloza MD          PROCEDURES:  None    CONSULTS:  None    CRITICAL CARE:      FINAL IMPRESSION      1. Abdominal pain, epigastric    2. Chest wall pain          DISPOSITION / PLAN     DISPOSITION Decision To Discharge 01/24/2023 06:06:17 AM      PATIENT REFERRED TO:  [facility]  [address] [place] [address]  [phone]    If symptoms worsen    [address] [address] [phone]  Schedule an appointment as soon as possible for a visit on 1/27/2023  Please call to make an  appointment with PCP.  Call to establish care    DISCHARGE MEDICATIONS:  Discharge Medication List as of 1/24/2023  6:06 Yoandy Zarate MD  Emergency Medicine Resident    (Please note that portions of this note were completed with a voice recognition program.Efforts were made to edit the dictations but occasionally words are mis-transcribed.)        Nya Penaloza MD  Resident  01/24/23 [address] [place]  Resident  01/29/23 2538

## 2023-01-24 NOTE — ED NOTES
O2 sat 88-89% on RA with good pleth, attached to O2 at 2 lpm via nasal cannula.        Jeannine Bocanegra RN  01/24/23 3302

## 2023-01-24 NOTE — ED NOTES
The following labs were labeled with appropriate pt sticker and tubed to lab:     [] Blue     [] Lavender   [] on ice  [x] Green/yellow  [] Green/black [] on ice  [] Selinda Edwin  [] on ice  [] Yellow  [] Red  [] Type/ Screen  [] ABG  [] VBG    [] COVID-19 swab    [] Rapid  [] PCR  [] Flu swab  [] Peds Viral Panel     [] Urine Sample  [] Fecal Sample  [] Pelvic Cultures  [] Blood Cultures  [] X 2  [] STREP Cultures       Zoe Pereira RN  01/24/23 2923

## 2023-01-24 NOTE — ED NOTES
Patient here via EMS c/o abdominal pain x1 week and constipation. During RN triage, patient complaints changed from original story. Patient now c/o abdominal pain \"for months with a lot of weight loss\", diarrhea, and nausea/vomiting. States he sometimes \"feels something hard\" in his abdomen. When pt asked if he was experiencing chest pain, pt states \"yeah I feel this cold spot in my chest\". Patient denies any falls or injuries. On arrival, patient ambulatory, A+OX4, respirations even and unlabored, speaking in complete sentences.          Frances Loomis, AMAIRANI  01/24/23 8891

## 2023-01-24 NOTE — DISCHARGE INSTRUCTIONS
Your work up is negative. You will be discharge. You are given information to follow up with PCP. Please return to the emergency department if you have worsening symptoms. CT ABDOMEN PELVIS W IV CONTRAST Additional Contrast? None   Final Result   No CT evidence of an acute to the logic process in the abdomen or pelvis. XR CHEST (2 VW)   Final Result   No acute findings.

## 2023-01-24 NOTE — ED NOTES
The following labs were labeled with appropriate pt sticker and tubed to lab:     [x] Blue     [x] Lavender   [] on ice  [x] Green/yellow  [] Green/black [] on ice  [] Apollo Dunks  [] on ice  [] Yellow  [x] Red  [] Type/ Screen  [] ABG  [] VBG    [] COVID-19 swab    [] Rapid  [] PCR  [] Flu swab  [] Peds Viral Panel     [] Urine Sample  [] Fecal Sample  [] Pelvic Cultures  [] Blood Cultures  [] X 2  [] STREP Cultures       Omega Phoenix RN  01/24/23 8831

## 2023-01-24 NOTE — ED NOTES
Pt. Resting on stretcher, eyes open, RR even and non-labored, on o2 at 2 lpm.  Pt verbalized relief of abdominal pain.   Will continue to monitor      Luba Jacinto RN  01/24/23 7605

## 2023-06-19 ENCOUNTER — HOSPITAL ENCOUNTER (INPATIENT)
Age: 58
LOS: 1 days | Discharge: LEFT BEFORE DISCHARGE | DRG: 392 | End: 2023-06-20
Attending: EMERGENCY MEDICINE | Admitting: FAMILY MEDICINE
Payer: MEDICARE

## 2023-06-19 ENCOUNTER — APPOINTMENT (OUTPATIENT)
Dept: MRI IMAGING | Age: 58
DRG: 392 | End: 2023-06-19
Payer: MEDICARE

## 2023-06-19 ENCOUNTER — APPOINTMENT (OUTPATIENT)
Dept: GENERAL RADIOLOGY | Age: 58
DRG: 392 | End: 2023-06-19
Payer: MEDICARE

## 2023-06-19 ENCOUNTER — APPOINTMENT (OUTPATIENT)
Dept: CT IMAGING | Age: 58
DRG: 392 | End: 2023-06-19
Payer: MEDICARE

## 2023-06-19 DIAGNOSIS — R10.84 GENERALIZED ABDOMINAL PAIN: ICD-10-CM

## 2023-06-19 DIAGNOSIS — R19.00 RETROPERITONEAL MASS: Primary | ICD-10-CM

## 2023-06-19 DIAGNOSIS — K29.80 DUODENITIS: ICD-10-CM

## 2023-06-19 DIAGNOSIS — K86.89 DILATION OF PANCREATIC DUCT: ICD-10-CM

## 2023-06-19 PROBLEM — R10.9 ABDOMINAL PAIN: Status: ACTIVE | Noted: 2023-06-19

## 2023-06-19 LAB
ALBUMIN SERPL-MCNC: 4.2 G/DL (ref 3.5–5.2)
ALBUMIN SERPL-MCNC: 4.3 G/DL (ref 3.5–5.2)
ALBUMIN/GLOB SERPL: 1.4 {RATIO} (ref 1–2.5)
ALBUMIN/GLOB SERPL: 1.5 {RATIO} (ref 1–2.5)
ALP SERPL-CCNC: 101 U/L (ref 40–129)
ALP SERPL-CCNC: 97 U/L (ref 40–129)
ALT SERPL-CCNC: 15 U/L (ref 5–41)
ALT SERPL-CCNC: 16 U/L (ref 5–41)
ANION GAP SERPL CALCULATED.3IONS-SCNC: 12 MMOL/L (ref 9–17)
ANION GAP SERPL CALCULATED.3IONS-SCNC: 13 MMOL/L (ref 9–17)
AST SERPL-CCNC: 14 U/L
AST SERPL-CCNC: 15 U/L
BILIRUB SERPL-MCNC: 0.3 MG/DL (ref 0.3–1.2)
BILIRUB SERPL-MCNC: 0.6 MG/DL (ref 0.3–1.2)
BNP SERPL-MCNC: 68 PG/ML
BUN SERPL-MCNC: 11 MG/DL (ref 6–20)
BUN SERPL-MCNC: 12 MG/DL (ref 6–20)
CALCIUM SERPL-MCNC: 10.3 MG/DL (ref 8.6–10.4)
CALCIUM SERPL-MCNC: 9.8 MG/DL (ref 8.6–10.4)
CHLORIDE SERPL-SCNC: 100 MMOL/L (ref 98–107)
CHLORIDE SERPL-SCNC: 102 MMOL/L (ref 98–107)
CO2 SERPL-SCNC: 22 MMOL/L (ref 20–31)
CO2 SERPL-SCNC: 24 MMOL/L (ref 20–31)
CREAT SERPL-MCNC: 0.75 MG/DL (ref 0.7–1.2)
CREAT SERPL-MCNC: 0.85 MG/DL (ref 0.7–1.2)
ERYTHROCYTE [DISTWIDTH] IN BLOOD BY AUTOMATED COUNT: 13.9 % (ref 11.8–14.4)
GFR SERPL CREATININE-BSD FRML MDRD: >60 ML/MIN/1.73M2
GFR SERPL CREATININE-BSD FRML MDRD: >60 ML/MIN/1.73M2
GLUCOSE SERPL-MCNC: 109 MG/DL (ref 70–99)
GLUCOSE SERPL-MCNC: 166 MG/DL (ref 70–99)
HCT VFR BLD AUTO: 44.9 % (ref 40.7–50.3)
HGB BLD-MCNC: 14.9 G/DL (ref 13–17)
LACTIC ACID, WHOLE BLOOD: 1.1 MMOL/L (ref 0.7–2.1)
LIPASE SERPL-CCNC: 66 U/L (ref 13–60)
MCH RBC QN AUTO: 28 PG (ref 25.2–33.5)
MCHC RBC AUTO-ENTMCNC: 33.2 G/DL (ref 28.4–34.8)
MCV RBC AUTO: 84.2 FL (ref 82.6–102.9)
NRBC AUTOMATED: 0 PER 100 WBC
PLATELET # BLD AUTO: 408 K/UL (ref 138–453)
PMV BLD AUTO: 9 FL (ref 8.1–13.5)
POTASSIUM SERPL-SCNC: 4.4 MMOL/L (ref 3.7–5.3)
POTASSIUM SERPL-SCNC: 4.6 MMOL/L (ref 3.7–5.3)
PROT SERPL-MCNC: 7.1 G/DL (ref 6.4–8.3)
PROT SERPL-MCNC: 7.3 G/DL (ref 6.4–8.3)
RBC # BLD AUTO: 5.33 M/UL (ref 4.21–5.77)
REASON FOR REJECTION: NORMAL
SODIUM SERPL-SCNC: 136 MMOL/L (ref 135–144)
SODIUM SERPL-SCNC: 137 MMOL/L (ref 135–144)
SPECIMEN SOURCE: NORMAL
TROPONIN I SERPL HS-MCNC: 13 NG/L (ref 0–22)
TROPONIN I SERPL HS-MCNC: 7 NG/L (ref 0–22)
WBC OTHER # BLD: 11.3 K/UL (ref 3.5–11.3)
ZZ NTE CLEAN UP: ORDERED TEST: NORMAL

## 2023-06-19 PROCEDURE — 80053 COMPREHEN METABOLIC PANEL: CPT

## 2023-06-19 PROCEDURE — 74181 MRI ABDOMEN W/O CONTRAST: CPT

## 2023-06-19 PROCEDURE — 99222 1ST HOSP IP/OBS MODERATE 55: CPT | Performed by: STUDENT IN AN ORGANIZED HEALTH CARE EDUCATION/TRAINING PROGRAM

## 2023-06-19 PROCEDURE — 2580000003 HC RX 258: Performed by: INTERNAL MEDICINE

## 2023-06-19 PROCEDURE — 83690 ASSAY OF LIPASE: CPT

## 2023-06-19 PROCEDURE — 71046 X-RAY EXAM CHEST 2 VIEWS: CPT

## 2023-06-19 PROCEDURE — 84484 ASSAY OF TROPONIN QUANT: CPT

## 2023-06-19 PROCEDURE — 2580000003 HC RX 258

## 2023-06-19 PROCEDURE — 83605 ASSAY OF LACTIC ACID: CPT

## 2023-06-19 PROCEDURE — 6360000002 HC RX W HCPCS

## 2023-06-19 PROCEDURE — 6360000002 HC RX W HCPCS: Performed by: EMERGENCY MEDICINE

## 2023-06-19 PROCEDURE — 99285 EMERGENCY DEPT VISIT HI MDM: CPT

## 2023-06-19 PROCEDURE — 74018 RADEX ABDOMEN 1 VIEW: CPT

## 2023-06-19 PROCEDURE — C9113 INJ PANTOPRAZOLE SODIUM, VIA: HCPCS

## 2023-06-19 PROCEDURE — 85027 COMPLETE CBC AUTOMATED: CPT

## 2023-06-19 PROCEDURE — 6360000002 HC RX W HCPCS: Performed by: NURSE PRACTITIONER

## 2023-06-19 PROCEDURE — 96375 TX/PRO/DX INJ NEW DRUG ADDON: CPT

## 2023-06-19 PROCEDURE — 93005 ELECTROCARDIOGRAM TRACING: CPT | Performed by: EMERGENCY MEDICINE

## 2023-06-19 PROCEDURE — 6360000004 HC RX CONTRAST MEDICATION: Performed by: EMERGENCY MEDICINE

## 2023-06-19 PROCEDURE — 96374 THER/PROPH/DIAG INJ IV PUSH: CPT

## 2023-06-19 PROCEDURE — 83880 ASSAY OF NATRIURETIC PEPTIDE: CPT

## 2023-06-19 PROCEDURE — 1200000000 HC SEMI PRIVATE

## 2023-06-19 PROCEDURE — 36415 COLL VENOUS BLD VENIPUNCTURE: CPT

## 2023-06-19 PROCEDURE — 74174 CTA ABD&PLVS W/CONTRAST: CPT

## 2023-06-19 RX ORDER — ONDANSETRON 4 MG/1
4 TABLET, ORALLY DISINTEGRATING ORAL EVERY 8 HOURS PRN
Status: DISCONTINUED | OUTPATIENT
Start: 2023-06-19 | End: 2023-06-20 | Stop reason: HOSPADM

## 2023-06-19 RX ORDER — ACETAMINOPHEN 325 MG/1
650 TABLET ORAL EVERY 6 HOURS PRN
Status: DISCONTINUED | OUTPATIENT
Start: 2023-06-19 | End: 2023-06-20 | Stop reason: HOSPADM

## 2023-06-19 RX ORDER — SODIUM CHLORIDE 9 MG/ML
INJECTION, SOLUTION INTRAVENOUS CONTINUOUS
Status: DISCONTINUED | OUTPATIENT
Start: 2023-06-19 | End: 2023-06-20 | Stop reason: HOSPADM

## 2023-06-19 RX ORDER — MAGNESIUM SULFATE 1 G/100ML
1000 INJECTION INTRAVENOUS PRN
Status: DISCONTINUED | OUTPATIENT
Start: 2023-06-19 | End: 2023-06-20 | Stop reason: HOSPADM

## 2023-06-19 RX ORDER — SODIUM CHLORIDE 9 MG/ML
INJECTION, SOLUTION INTRAVENOUS PRN
Status: DISCONTINUED | OUTPATIENT
Start: 2023-06-19 | End: 2023-06-20 | Stop reason: HOSPADM

## 2023-06-19 RX ORDER — ONDANSETRON 2 MG/ML
4 INJECTION INTRAMUSCULAR; INTRAVENOUS EVERY 6 HOURS PRN
Status: DISCONTINUED | OUTPATIENT
Start: 2023-06-19 | End: 2023-06-20 | Stop reason: HOSPADM

## 2023-06-19 RX ORDER — MORPHINE SULFATE 4 MG/ML
4 INJECTION, SOLUTION INTRAMUSCULAR; INTRAVENOUS
Status: DISCONTINUED | OUTPATIENT
Start: 2023-06-19 | End: 2023-06-20 | Stop reason: HOSPADM

## 2023-06-19 RX ORDER — MORPHINE SULFATE 4 MG/ML
4 INJECTION, SOLUTION INTRAMUSCULAR; INTRAVENOUS ONCE
Status: COMPLETED | OUTPATIENT
Start: 2023-06-19 | End: 2023-06-19

## 2023-06-19 RX ORDER — ACETAMINOPHEN 650 MG/1
650 SUPPOSITORY RECTAL EVERY 6 HOURS PRN
Status: DISCONTINUED | OUTPATIENT
Start: 2023-06-19 | End: 2023-06-20 | Stop reason: HOSPADM

## 2023-06-19 RX ORDER — MORPHINE SULFATE 2 MG/ML
2 INJECTION, SOLUTION INTRAMUSCULAR; INTRAVENOUS
Status: DISCONTINUED | OUTPATIENT
Start: 2023-06-19 | End: 2023-06-20 | Stop reason: HOSPADM

## 2023-06-19 RX ORDER — SODIUM CHLORIDE 0.9 % (FLUSH) 0.9 %
10 SYRINGE (ML) INJECTION PRN
Status: DISCONTINUED | OUTPATIENT
Start: 2023-06-19 | End: 2023-06-20 | Stop reason: HOSPADM

## 2023-06-19 RX ORDER — FENTANYL CITRATE 50 UG/ML
50 INJECTION, SOLUTION INTRAMUSCULAR; INTRAVENOUS ONCE
Status: COMPLETED | OUTPATIENT
Start: 2023-06-19 | End: 2023-06-19

## 2023-06-19 RX ORDER — SODIUM CHLORIDE 0.9 % (FLUSH) 0.9 %
5-40 SYRINGE (ML) INJECTION EVERY 12 HOURS SCHEDULED
Status: DISCONTINUED | OUTPATIENT
Start: 2023-06-19 | End: 2023-06-20 | Stop reason: HOSPADM

## 2023-06-19 RX ORDER — BISACODYL 10 MG
10 SUPPOSITORY, RECTAL RECTAL ONCE
Status: DISCONTINUED | OUTPATIENT
Start: 2023-06-19 | End: 2023-06-20

## 2023-06-19 RX ORDER — POTASSIUM CHLORIDE 7.45 MG/ML
10 INJECTION INTRAVENOUS PRN
Status: DISCONTINUED | OUTPATIENT
Start: 2023-06-19 | End: 2023-06-20 | Stop reason: HOSPADM

## 2023-06-19 RX ORDER — POTASSIUM CHLORIDE 20 MEQ/1
40 TABLET, EXTENDED RELEASE ORAL PRN
Status: DISCONTINUED | OUTPATIENT
Start: 2023-06-19 | End: 2023-06-20 | Stop reason: HOSPADM

## 2023-06-19 RX ADMIN — MORPHINE SULFATE 4 MG: 4 INJECTION INTRAVENOUS at 22:07

## 2023-06-19 RX ADMIN — SODIUM CHLORIDE, PRESERVATIVE FREE 40 MG: 5 INJECTION INTRAVENOUS at 23:02

## 2023-06-19 RX ADMIN — SODIUM CHLORIDE: 9 INJECTION, SOLUTION INTRAVENOUS at 22:10

## 2023-06-19 RX ADMIN — IOPAMIDOL 100 ML: 755 INJECTION, SOLUTION INTRAVENOUS at 16:06

## 2023-06-19 RX ADMIN — SODIUM CHLORIDE, PRESERVATIVE FREE 10 ML: 5 INJECTION INTRAVENOUS at 22:07

## 2023-06-19 RX ADMIN — FENTANYL CITRATE 50 MCG: 50 INJECTION, SOLUTION INTRAMUSCULAR; INTRAVENOUS at 17:43

## 2023-06-19 RX ADMIN — MORPHINE SULFATE 4 MG: 4 INJECTION INTRAVENOUS at 17:47

## 2023-06-19 ASSESSMENT — ENCOUNTER SYMPTOMS
EYE DISCHARGE: 0
CONSTIPATION: 1
COLOR CHANGE: 1
ABDOMINAL DISTENTION: 0
EYE ITCHING: 0
BACK PAIN: 0
SHORTNESS OF BREATH: 1
CHEST TIGHTNESS: 0
NAUSEA: 0
ABDOMINAL PAIN: 1
VOMITING: 0

## 2023-06-19 ASSESSMENT — PAIN DESCRIPTION - ORIENTATION
ORIENTATION: MID
ORIENTATION: MID

## 2023-06-19 ASSESSMENT — PAIN SCALES - GENERAL
PAINLEVEL_OUTOF10: 10
PAINLEVEL_OUTOF10: 10
PAINLEVEL_OUTOF10: 8

## 2023-06-19 ASSESSMENT — PAIN DESCRIPTION - LOCATION
LOCATION: ABDOMEN

## 2023-06-19 ASSESSMENT — PAIN DESCRIPTION - DESCRIPTORS
DESCRIPTORS: ACHING;THROBBING
DESCRIPTORS: ACHING;THROBBING

## 2023-06-19 ASSESSMENT — PAIN - FUNCTIONAL ASSESSMENT: PAIN_FUNCTIONAL_ASSESSMENT: ACTIVITIES ARE NOT PREVENTED

## 2023-06-19 NOTE — ED NOTES
The following labs were labeled with appropriate pt sticker and tubed to lab:     [] Blue     [] Lavender   [] on ice  [] Green/yellow  [x] Green/black [x] on ice  resend   [] Pecchris Brunt  [] on ice  [] Yellow  [] Red  [] Type/ Screen  [] ABG  [] VBG    [] COVID-19 swab    [] Rapid  [] PCR  [] Flu swab  [] Peds Viral Panel     [] Urine Sample  [] Fecal Sample  [] Pelvic Cultures  [] Blood Cultures  [] X 2  [] STREP Cultures       John Wagoner RN  06/19/23 2742

## 2023-06-19 NOTE — ED NOTES
Verbal report to Vale Valencia   Patient will be going to MRI first then to the assigned room, with all personal belongings      Rosie Alves RN  06/19/23 1291

## 2023-06-19 NOTE — ED TRIAGE NOTES
Pt has had abdominal pain for 6 months and chest pain since last night. Pt denies drug, alcohol, tobacco use.

## 2023-06-19 NOTE — ED NOTES
The following labs were labeled with appropriate pt sticker and tubed to lab:     [x] Blue     [x] Lavender   [] on ice  [x] Green/yellow  [] Green/black [] on ice  [] Haven Frederic  [] on ice  [] Yellow  [] Red  [] Type/ Screen  [] ABG  [] VBG    [] COVID-19 swab    [] Rapid  [] PCR  [] Flu swab  [] Peds Viral Panel     [] Urine Sample  [] Fecal Sample  [] Pelvic Cultures  [] Blood Cultures  [] X 2  [] STREP Cultures         Guillermo Diaz RN  06/19/23 4511

## 2023-06-19 NOTE — ED PROVIDER NOTES
[facility]     Emergency Department     Faculty Attestation    I performed a history and physical examination of the patient and discussed management with the resident. I reviewed the resident´s note and agree with the documented findings and plan of care. Any areas of disagreement are noted on the chart. I was personally present for the key portions of any procedures. I have documented in the chart those procedures where I was not present during the key portions. I have reviewed the emergency nurses triage note. I agree with the chief complaint, past medical history, past surgical history, allergies, medications, social and family history as documented unless otherwise noted below. For Physician Assistant/ Nurse Practitioner cases/documentation I have personally evaluated this patient and have completed at least one if not all key elements of the E/M (history, physical exam, and MDM). Additional findings are as noted. Patient was crying in pain and when I went to see him, minimal tenderness to palpation of the abdomen, no palpable ventral hernia. Patient has had abdominal pain intermittently for 6 months.      Maegan Aaron MD  06/19/23 3998
without acute process. [SK]   1700 Discussed CT with Dr. Yeni Connor who recommends GI consult for next imaging recommendation and duodentitis. Recommends admission for further workup  [SK]   1702 Lactic Acid, Whole Blood: 1.1 [SK]   1732 Intermed to admit the patient  [SK]   1858 Discussed patient with GI after discussion with Dr. Yeni Connor. GI recommends MRCP. Order placed. Also recommend consult to hepatobiliary surgery  [SK]   1859 Dr. Subhash Padron out of [facility], general surgery consulted  [SK]      ED Course User Index  [SK] Isaias Enriquez DO       PROCEDURES:      CONSULTS:  IP CONSULT TO HOSPITALIST  IP CONSULT TO GI  IP CONSULT TO GENERAL SURGERY    CRITICAL CARE:  There was significant risk of life threatening deterioration of patient's condition requiring my direct management. Critical care time 0 minutes, excluding any documented procedures. FINAL IMPRESSION      1. Retroperitoneal mass    2. Duodenitis    3. Generalized abdominal pain    4. Dilation of pancreatic duct          DISPOSITION / PLAN     DISPOSITION Admitted 06/19/2023 05:48:11 PM      PATIENT REFERRED TO:  No follow-up provider specified.     DISCHARGE MEDICATIONS:  New Prescriptions    No medications on file       Isaias Enriquez DO  Emergency Medicine Resident    (Please note that portions of thisnote were completed with a voice recognition program.  Efforts were made to edit the dictations but occasionally words are mis-transcribed.)  ss      Isaias Enriquez DO  Resident  06/19/23 1900

## 2023-06-20 ENCOUNTER — APPOINTMENT (OUTPATIENT)
Dept: CT IMAGING | Age: 58
DRG: 392 | End: 2023-06-20
Payer: MEDICARE

## 2023-06-20 VITALS
TEMPERATURE: 98.5 F | DIASTOLIC BLOOD PRESSURE: 71 MMHG | SYSTOLIC BLOOD PRESSURE: 101 MMHG | RESPIRATION RATE: 16 BRPM | OXYGEN SATURATION: 95 % | WEIGHT: 160 LBS | BODY MASS INDEX: 22.9 KG/M2 | HEART RATE: 78 BPM | HEIGHT: 70 IN

## 2023-06-20 PROBLEM — R10.84 GENERALIZED ABDOMINAL PAIN: Status: ACTIVE | Noted: 2023-06-20

## 2023-06-20 PROBLEM — K29.80 DUODENITIS: Status: ACTIVE | Noted: 2023-06-19

## 2023-06-20 PROBLEM — K86.89 DILATION OF PANCREATIC DUCT: Status: ACTIVE | Noted: 2023-06-20

## 2023-06-20 PROBLEM — R97.8 ELEVATED CA 19-9 LEVEL: Status: ACTIVE | Noted: 2023-06-20

## 2023-06-20 PROBLEM — R19.00 RETROPERITONEAL MASS: Status: ACTIVE | Noted: 2023-06-20

## 2023-06-20 PROBLEM — Z72.0 TOBACCO USE: Status: ACTIVE | Noted: 2023-06-20

## 2023-06-20 PROBLEM — R63.4 WEIGHT LOSS: Status: ACTIVE | Noted: 2023-06-20

## 2023-06-20 PROBLEM — R97.0 ELEVATED CEA: Status: ACTIVE | Noted: 2023-06-20

## 2023-06-20 LAB
AFP SERPL-MCNC: 3.1 UG/L
ALBUMIN SERPL-MCNC: 4 G/DL (ref 3.5–5.2)
ALBUMIN/GLOB SERPL: 1.4 {RATIO} (ref 1–2.5)
ALP SERPL-CCNC: 96 U/L (ref 40–129)
ALT SERPL-CCNC: 13 U/L (ref 5–41)
AMMONIA PLAS-SCNC: 38 UMOL/L (ref 16–60)
ANION GAP SERPL CALCULATED.3IONS-SCNC: 16 MMOL/L (ref 9–17)
AST SERPL-CCNC: 12 U/L
BASOPHILS # BLD: 0.05 K/UL (ref 0–0.2)
BASOPHILS NFR BLD: 1 % (ref 0–2)
BILIRUB SERPL-MCNC: 0.4 MG/DL (ref 0.3–1.2)
BNP SERPL-MCNC: 44 PG/ML
BUN SERPL-MCNC: 12 MG/DL (ref 6–20)
CALCIUM SERPL-MCNC: 9.7 MG/DL (ref 8.6–10.4)
CANCER AG19-9 SERPL IA-ACNC: 62 U/ML (ref 0–35)
CEA SERPL-MCNC: 4.2 NG/ML
CHLORIDE SERPL-SCNC: 102 MMOL/L (ref 98–107)
CO2 SERPL-SCNC: 21 MMOL/L (ref 20–31)
CREAT SERPL-MCNC: 0.71 MG/DL (ref 0.7–1.2)
CRP SERPL HS-MCNC: <3 MG/L (ref 0–5)
EKG ATRIAL RATE: 85 BPM
EKG P AXIS: 85 DEGREES
EKG P-R INTERVAL: 148 MS
EKG Q-T INTERVAL: 374 MS
EKG QRS DURATION: 80 MS
EKG QTC CALCULATION (BAZETT): 445 MS
EKG R AXIS: 87 DEGREES
EKG T AXIS: 67 DEGREES
EKG VENTRICULAR RATE: 85 BPM
EOSINOPHIL # BLD: 0.11 K/UL (ref 0–0.44)
EOSINOPHILS RELATIVE PERCENT: 1 % (ref 1–4)
ERYTHROCYTE [DISTWIDTH] IN BLOOD BY AUTOMATED COUNT: 14 % (ref 11.8–14.4)
EST. AVERAGE GLUCOSE BLD GHB EST-MCNC: 120 MG/DL
GFR SERPL CREATININE-BSD FRML MDRD: >60 ML/MIN/1.73M2
GLUCOSE SERPL-MCNC: 94 MG/DL (ref 70–99)
HBA1C MFR BLD: 5.8 % (ref 4–6)
HCT VFR BLD AUTO: 45.3 % (ref 40.7–50.3)
HGB BLD-MCNC: 14.8 G/DL (ref 13–17)
IMM GRANULOCYTES # BLD AUTO: 0.03 K/UL (ref 0–0.3)
IMM GRANULOCYTES NFR BLD: 0 %
INR PPP: 1
LIPASE SERPL-CCNC: 57 U/L (ref 13–60)
LYMPHOCYTES # BLD: 28 % (ref 24–43)
LYMPHOCYTES NFR BLD: 2.89 K/UL (ref 1.1–3.7)
MAGNESIUM SERPL-MCNC: 2.2 MG/DL (ref 1.6–2.6)
MCH RBC QN AUTO: 28 PG (ref 25.2–33.5)
MCHC RBC AUTO-ENTMCNC: 32.7 G/DL (ref 28.4–34.8)
MCV RBC AUTO: 85.8 FL (ref 82.6–102.9)
MONOCYTES NFR BLD: 0.89 K/UL (ref 0.1–1.2)
MONOCYTES NFR BLD: 9 % (ref 3–12)
NEUTROPHILS NFR BLD: 61 % (ref 36–65)
NEUTS SEG NFR BLD: 6.41 K/UL (ref 1.5–8.1)
NRBC AUTOMATED: 0 PER 100 WBC
PHOSPHATE SERPL-MCNC: 3.6 MG/DL (ref 2.5–4.5)
PLATELET # BLD AUTO: 369 K/UL (ref 138–453)
PMV BLD AUTO: 9.8 FL (ref 8.1–13.5)
POTASSIUM SERPL-SCNC: 4.2 MMOL/L (ref 3.7–5.3)
PREALB SERPL-MCNC: 24.1 MG/DL (ref 20–40)
PROCALCITONIN SERPL-MCNC: 0.05 NG/ML
PROT SERPL-MCNC: 6.8 G/DL (ref 6.4–8.3)
PROTHROMBIN TIME: 12.9 SEC (ref 11.7–14.9)
RBC # BLD AUTO: 5.28 M/UL (ref 4.21–5.77)
SODIUM SERPL-SCNC: 139 MMOL/L (ref 135–144)
TSH SERPL-MCNC: 1.68 UIU/ML (ref 0.3–5)
WBC OTHER # BLD: 10.4 K/UL (ref 3.5–11.3)

## 2023-06-20 PROCEDURE — 99232 SBSQ HOSP IP/OBS MODERATE 35: CPT | Performed by: FAMILY MEDICINE

## 2023-06-20 PROCEDURE — 82140 ASSAY OF AMMONIA: CPT

## 2023-06-20 PROCEDURE — 2580000003 HC RX 258: Performed by: NURSE PRACTITIONER

## 2023-06-20 PROCEDURE — 83690 ASSAY OF LIPASE: CPT

## 2023-06-20 PROCEDURE — 85027 COMPLETE CBC AUTOMATED: CPT

## 2023-06-20 PROCEDURE — 83880 ASSAY OF NATRIURETIC PEPTIDE: CPT

## 2023-06-20 PROCEDURE — 83735 ASSAY OF MAGNESIUM: CPT

## 2023-06-20 PROCEDURE — 82105 ALPHA-FETOPROTEIN SERUM: CPT

## 2023-06-20 PROCEDURE — 84145 PROCALCITONIN (PCT): CPT

## 2023-06-20 PROCEDURE — 85610 PROTHROMBIN TIME: CPT

## 2023-06-20 PROCEDURE — 84443 ASSAY THYROID STIM HORMONE: CPT

## 2023-06-20 PROCEDURE — 6360000002 HC RX W HCPCS: Performed by: NURSE PRACTITIONER

## 2023-06-20 PROCEDURE — 84134 ASSAY OF PREALBUMIN: CPT

## 2023-06-20 PROCEDURE — 6370000000 HC RX 637 (ALT 250 FOR IP): Performed by: STUDENT IN AN ORGANIZED HEALTH CARE EDUCATION/TRAINING PROGRAM

## 2023-06-20 PROCEDURE — 2500000003 HC RX 250 WO HCPCS: Performed by: NURSE PRACTITIONER

## 2023-06-20 PROCEDURE — 6360000004 HC RX CONTRAST MEDICATION: Performed by: NURSE PRACTITIONER

## 2023-06-20 PROCEDURE — 80053 COMPREHEN METABOLIC PANEL: CPT

## 2023-06-20 PROCEDURE — 36415 COLL VENOUS BLD VENIPUNCTURE: CPT

## 2023-06-20 PROCEDURE — 86140 C-REACTIVE PROTEIN: CPT

## 2023-06-20 PROCEDURE — 71270 CT THORAX DX C-/C+: CPT

## 2023-06-20 PROCEDURE — 84100 ASSAY OF PHOSPHORUS: CPT

## 2023-06-20 PROCEDURE — 83036 HEMOGLOBIN GLYCOSYLATED A1C: CPT

## 2023-06-20 PROCEDURE — APPSS60 APP SPLIT SHARED TIME 46-60 MINUTES: Performed by: NURSE PRACTITIONER

## 2023-06-20 PROCEDURE — 82378 CARCINOEMBRYONIC ANTIGEN: CPT

## 2023-06-20 PROCEDURE — C9113 INJ PANTOPRAZOLE SODIUM, VIA: HCPCS | Performed by: NURSE PRACTITIONER

## 2023-06-20 PROCEDURE — 86301 IMMUNOASSAY TUMOR CA 19-9: CPT

## 2023-06-20 RX ORDER — THIAMINE HYDROCHLORIDE 100 MG/ML
100 INJECTION, SOLUTION INTRAMUSCULAR; INTRAVENOUS DAILY
Status: DISCONTINUED | OUTPATIENT
Start: 2023-06-21 | End: 2023-06-20

## 2023-06-20 RX ORDER — SUCRALFATE 1 G/1
1 TABLET ORAL EVERY 6 HOURS SCHEDULED
Status: DISCONTINUED | OUTPATIENT
Start: 2023-06-20 | End: 2023-06-20 | Stop reason: HOSPADM

## 2023-06-20 RX ORDER — BISACODYL 10 MG
10 SUPPOSITORY, RECTAL RECTAL ONCE
Status: DISCONTINUED | OUTPATIENT
Start: 2023-06-20 | End: 2023-06-20 | Stop reason: HOSPADM

## 2023-06-20 RX ORDER — FOLIC ACID 1 MG/1
1 TABLET ORAL DAILY
Status: DISCONTINUED | OUTPATIENT
Start: 2023-06-21 | End: 2023-06-20 | Stop reason: HOSPADM

## 2023-06-20 RX ADMIN — MORPHINE SULFATE 4 MG: 4 INJECTION INTRAVENOUS at 03:48

## 2023-06-20 RX ADMIN — MORPHINE SULFATE 4 MG: 4 INJECTION INTRAVENOUS at 06:38

## 2023-06-20 RX ADMIN — THIAMINE HYDROCHLORIDE: 100 INJECTION, SOLUTION INTRAMUSCULAR; INTRAVENOUS at 12:28

## 2023-06-20 RX ADMIN — MORPHINE SULFATE 4 MG: 4 INJECTION INTRAVENOUS at 00:41

## 2023-06-20 RX ADMIN — SUCRALFATE 1 G: 1 TABLET ORAL at 06:38

## 2023-06-20 RX ADMIN — IOPAMIDOL 100 ML: 755 INJECTION, SOLUTION INTRAVENOUS at 10:49

## 2023-06-20 RX ADMIN — SODIUM CHLORIDE, PRESERVATIVE FREE 40 MG: 5 INJECTION INTRAVENOUS at 12:22

## 2023-06-20 ASSESSMENT — ENCOUNTER SYMPTOMS
SHORTNESS OF BREATH: 0
CHEST TIGHTNESS: 0
DIARRHEA: 0
CONSTIPATION: 0
BLOOD IN STOOL: 0
NAUSEA: 0
VOMITING: 0
COUGH: 0
WHEEZING: 0
ABDOMINAL PAIN: 1

## 2023-06-20 ASSESSMENT — PAIN SCALES - GENERAL
PAINLEVEL_OUTOF10: 10
PAINLEVEL_OUTOF10: 8

## 2023-06-20 ASSESSMENT — PAIN DESCRIPTION - ORIENTATION: ORIENTATION: MID

## 2023-06-20 ASSESSMENT — PAIN DESCRIPTION - LOCATION
LOCATION: ABDOMEN

## 2023-06-20 ASSESSMENT — PAIN DESCRIPTION - DESCRIPTORS: DESCRIPTORS: ACHING;THROBBING

## 2023-06-20 ASSESSMENT — PAIN - FUNCTIONAL ASSESSMENT: PAIN_FUNCTIONAL_ASSESSMENT: ACTIVITIES ARE NOT PREVENTED

## 2023-06-20 NOTE — DISCHARGE SUMMARY
[facility]  Office: [address], DO, Nathanael Byrd, DO, [facility], DO, Ramirez Corbin Blood, DO, Lebron Celestin MD, Hunter Larose MD, Hodan Ward MD, Anirudh Kumari MD,  Cecy Martinez MD, Prisca Kaur MD, Thai Griffith, DO, Kye France MD,  Prashant Ignacio MD, Hina Lees MD, Aimee Andrew, DO, Irwin Diaz MD, Mary Anne Deutsch MD, Mitchel Mcgarry DO, Lay De La Cruz MD, Johnetta Aschoff, MD, Ulysses Grimm MD, Aldair Arshad MD,  Mayra Brandon DO, Faustino Gibbons MD,  Amish Leija, CNP,  Misty Conde CNP, Keith Gayle CNP, Amalia Villatoro, CNP,  Christina Eisenmenger, [facility], Nora Quiros, CNP, Richar Jordan, CNP, Diane Reece, CNP, Luis E Barraza, CNP, Marisel Hernandez, CNP, Isaak Johns PA-C, Britta Cevallos, CNS, Richie Vickers, CNP, Denver Elizabeth, [facility]    Discharge Summary     Patient ID: Gris Clay  :  1965   MRN: [id number]     ACCOUNT:  [de-identified]   Patient's PCP: No primary care provider on file. Admit Date: 2023   Discharge Date: 2023     Length of Stay: 1  Code Status:  Full Code  Admitting Physician: Anirudh Kumari MD  Discharge Physician: Anirudh Kumari MD     Active Discharge Diagnoses:     Hospital Problem Lists:  Principal Problem:    Duodenitis  Active Problems:    GERD (gastroesophageal reflux disease)    Weight loss    Tobacco use    Elevated CEA    Elevated CA 19-9 level    Retroperitoneal mass    Generalized abdominal pain    Dilation of pancreatic duct  Resolved Problems:    * No resolved hospital problems. *      Admission Condition:  poor     Discharged Condition: stable    Hospital Stay:     HPI:    Gris Clay is a 66 y.o. Non- / non  male who presents with Abdominal Pain (On/off for 6 mo) and Chest Pain (Last night)   and is admitted to the hospital for the management of Abdominal pain.   61-year-old male with a

## 2023-06-20 NOTE — PROGRESS NOTES
[facility]  PROGRESS NOTE    Shift date: 06/19/23  Shift day: Monday   Shift # 2    Room # 7233/2442-70   Name: Beba Darling                Adventism:    Place of Yarsani:     Referral: Routine Visit    Admit Date & Time: 6/19/2023  2:10 PM    Assessment:  Beba Darling is a 66 y.o. male in the hospital      Intervention:  Writer introduced self and title as . Patient did not appear to mind  presence and engaged in conversation. Patient discussed his recent health leading up to his hospital visit. Patient appeared anxious and coping when discussing his health and life situations.  provided a supportive presence through active listening and words of affirmation. Outcome:  Patient appeared receptive to  presence. Plan:  Chaplains will remain available to offer spiritual and emotional support as needed.       Electronically signed by Devon Vivas on 6/19/2023 at 9:28 PM.  [facility]  [phone]
Comprehensive Nutrition Assessment    Type and Reason for Visit:  Consult    Nutrition Recommendations/Plan:   Continue NPO. Monitor for start of PO diet or nutrition support  If TPN, recommend premix bag starting at 42 mL/hr with 250 mL IVL. If PO diet, recommend diet appropriate ONS BID to supplement intake, along with PRN medication to address nausea. Malnutrition Assessment:  Malnutrition Status:  Insufficient data (06/20/23 1210)    Context:  Chronic Illness     Findings of the 6 clinical characteristics of malnutrition:  Energy Intake:  75% or less estimated energy requirements for 1 month or longer  Weight Loss:  Unable to assess     Body Fat Loss:  Unable to assess     Muscle Mass Loss:  Unable to assess    Fluid Accumulation:  No significant fluid accumulation     Strength:  Not Performed    Nutrition Assessment:    Consult for TPN. Patient with no PICC in place at this time, no orders for PICC to be placed. Admitted for duodenitis. Patient reports abdominal pain, poor appetite, and N/V over the last few months. Also reports no BM x2 days prior to admission. Patient states they have had a 20 lb weight loss over the past few months. Limited weight history available in EHR, unable to confirm weight changes. NGT in place for suction. Patient with multiple scheduled procedures/testing today, recommend waiting to start nutrition. Per NP, will reassess need for TPN tomorrow based on findings of testing. Dietitian to provide nutrition recommendations. Labs reviewed, electrolytes wnl. Meds include dulcolax, folvite, thiamine. Only stated weight available, actual weight requested. Nutrition Related Findings:    Labs/meds reviewed. No BM x2 days prior to admission.  Wound Type: None       Current Nutrition Intake & Therapies:    Average Meal Intake: NPO  Average Supplements Intake: NPO  Diet NPO Exceptions are: Sips of Water with Meds    Anthropometric Measures:  Height: 5' 10\" (177.8 cm)  Ideal Body
[facility]  Office: [address], DO, Lena Rollins, DO, Markie Butler, DO, Rose Tuttle, DO, Alex Becker MD, Rose Mary Kennedy MD, Ricky Rae MD, Brenton Acuña MD,  Yemi Read MD, Ronnie Trotter MD, Alex Frey, DO, Emeka Burgos MD,  Joni Ford MD, Magdalena Boas, MD, Yuniel Darby DO, Sophia Smith MD, Evelyne Rich MD, Dale Ramos DO, Micah Ramos MD, Aime Matamoros MD, dEward Alas MD, Silvestre Dickerson MD,  Meaghan Sims DO, Alejandra Saucedo MD,  Roselyn Lowery, CNP,  Padilla James, CNP, Rina , CNP, Pink Pill, CNP,  Santos Johnson, DNP, Pablo Abbott, CNP, Latasha Medicjamie, CNP, Cyndi Herbert, CNP, Karen Rubio, CNP, Aubree Perez, CNP, Narciso Santiago PA-C, Bo Mccurdy, CNS, Chris Aguirre, CNP, Kirill Feliz, CNP         [facility]    Progress Note    6/20/2023    2:30 PM    Name:   Acosta Mae  MRN:     [id number]     Acct:      [de-identified]   Room:   [facility]2674-24   Day:  1  Admit Date:  6/19/2023  2:10 PM    PCP:   No primary care provider on file. Code Status:  Full Code    Subjective:     C/C:   Chief Complaint   Patient presents with    Abdominal Pain     On/off for 6 mo    Chest Pain     Last night     Interval History Status: not changed. Patient seen and examined at bedside, no acute events overnight. Undergoing imaging/ testing this am   Had NGT placement yesterday with some relief of symptoms  Patient currently denies any chest pain, shortness of breath, chills, fevers, nausea or vomiting. He is frustrated as he feels angry and wants to eat  Patient vitals, labs and all providers notes were reviewed,from overnight shift and morning updates were noted and discussed with the nurse    Brief History:     Per HPI  Acosta Mae is a 66 y.o.  Non- / non  male who presents with Abdominal Pain (On/off for 6 mo) and Chest Pain
Patient left AMA. Patient removed his NG tube stating he no longer wanted to be here and he just wants to go. Writer expressed to the patient the importance of staying and having his procedure done tomorrow. Patient said he did not care and he just need to go. Dr. Garcia  was notified via [employer] and made aware that patient was leaving. Patient was compliant in signing the [facility] paperwork and left the unit shortly after.
Patient moving all over the place for MRI abdomen. Patient advised to hold still. Order changed to MRI Abdomen wo contrast MRCP due to patient refusing to continue exam with diagnostic images at this time.
optimization, consideration for PICC +/- supplemental PN   - give banana bag resuscitation and start thiamine and folic acid tomorrow  - CIWA as per primary team; alcohol cessation resources   - appreciate GI input for EGD, EUS/biopsy, screening colonoscopy   - obtain DVT scan, ECHO    - IS 15x/hr while awake, coughing and deep breathing; smoking cessation resources   - continue management of patient as per primary team/MD  - close [facility] outpatient f/u     Patient seen and examined. Vital signs, laboratory data, intake/output and radiologic findings reviewed. Plan of care discussed with Dr. Esperanza Ibanez whom is covering for Dr. Sanford Stevenson. KATYA Tomas  Surgical Oncology/ [facility]  Available through [facility]    Estimated time of 60 mins reviewing chart, labs, imaging, assessing patient, diagnosis and developing a plan of care. Attending Physician Statement  I have discussed the case with  team , including pertinent history and exam findings with the resident. I have seen and examined the patient and the key elements of the encounter have been performed by me. I agree with the assessment, plan and orders as documented by the resident.       Electronically signed by Jessa Devi IV, DO  on 6/21/2023 at 11:06 AM

## 2023-06-20 NOTE — CARE COORDINATION
Case Management Assessment  Initial Evaluation    Date/Time of Evaluation: 6/20/2023 9:11 AM  Assessment Completed by: Zaria Rey RN    If patient is discharged prior to next notation, then this note serves as note for discharge by case management. Patient Name: Beba Darling                   YOB: 1965  Diagnosis: Retroperitoneal mass [R19.00]  Abdominal pain [R10.9]  Duodenitis [K29.80]  Generalized abdominal pain [R10.84]  Dilation of pancreatic duct [K86.89]                   Date / Time: 6/19/2023  2:10 PM    Patient Admission Status: Inpatient   Readmission Risk (Low < 19, Mod (19-27), High > 27): Readmission Risk Score: 8.3    Current PCP: No primary care provider on file. PCP verified by CM? No (Given [facility] list)    Chart Reviewed: Yes      History Provided by: Patient  Patient Orientation: Alert and Oriented    Patient Cognition: Alert    Hospitalization in the last 30 days (Readmission):  No    If yes, Readmission Assessment in CM Navigator will be completed. Advance Directives:      Code Status: Full Code   Patient's Primary Decision Maker is: Legal Next of Kin      Discharge Planning:    Patient lives with: Alone Type of Home: House  Primary Care Giver: Self  Patient Support Systems include: None   Current Financial resources: Medicaid, Medicare  Current community resources:    Current services prior to admission: None            Current DME:              Type of Home Care services:  None    ADLS  Prior functional level: Independent in ADLs/IADLs  Current functional level: Independent in ADLs/IADLs    PT AM-PAC:   /24  OT AM-PAC:   /24    Family can provide assistance at DC: No  Would you like Case Management to discuss the discharge plan with any other family members/significant others, and if so, who?  No  Plans to Return to Present Housing: Yes  Other Identified Issues/Barriers to RETURNING to current housing: possible EGD  Potential Assistance needed at discharge: N/A

## 2023-06-20 NOTE — H&P
[facility]  Office: [address], DO, Nathanael Carson, DO, Annemarie Smithmill, DO, Ramirez Corbin Blood, DO, Lebron Celestin MD, Hunter Larose MD, Hodan Ward MD, Anirudh Kumari MD,  Cecy Martinez MD, Prisca Kaur MD, Thai Griffith, DO, Kye France MD,  Prashant Ignacio MD, Hina Lees MD, Aimee Andrew DO, Irwin Diaz MD, Mary Anne Deutsch MD, Mitchel Mcgarry DO, Lay De La Cruz MD, Johnetta Aschoff, MD, Ulysses Grimm MD, Aldair Arshad MD,  Mayra Brandon DO, Faustino Gibbons MD,  Amish Leija CNP,  Misty Conde CNP, Keith Gayle CNP, Amalia Villatoro, CNP,  Christina Eisenmenger, [facility], Nora Quiros, CNP, Richar Jordan, CNP, Diane Reece CNP, Luis E Barraza, CNP, Marisel Hernandez, CNP, Isaak Johns PA-C, Britta Cevallos, CNS, Richie Vickers, CNP, Denver Elizabeth, [address]    HISTORY AND PHYSICAL EXAMINATION            Date:   6/19/2023  Patient name:  Gris Clay  Date of admission:  6/19/2023  2:10 PM  MRN:   [id number]  Account:  [de-identified]  YOB: 1965  PCP:    No primary care provider on file. Room:   [facility]7841-  Code Status:    Full Code    Chief Complaint:     Chief Complaint   Patient presents with    Abdominal Pain     On/off for 6 mo    Chest Pain     Last night       History Obtained From:     patient    History of Present Illness:     Gris Clay is a 66 y.o. Non- / non  male who presents with Abdominal Pain (On/off for 6 mo) and Chest Pain (Last night)   and is admitted to the hospital for the management of Abdominal pain. 53-year-old male with a past medical history of GERD and schizophrenia presents to the Emergency Department for acute on chronic abdominal pain. Patient been having abdominal pain for 6 months associated with 20 pound weight loss. Patient does endorse poor p.o. intake and decreased appetite.   General surgeon was consulted and

## 2023-06-20 NOTE — CONSULTS
[facility] ([facility]) Gastroenterology  Consultation Note       Patient left AMA before evaluation  . Chief Complaint:      Reason for consult:    Abdominal pain  ? CT imaging with dilated pancreatic duct, nodules and ?mass    History of present illness: This is a 66 y.o. male with PMH including        Summary of imaging completed at this time:        Summary of current abnormal labs completed at this time:    Metabolic:  Hematology:  Coags:  Liver profile:  Cardiac profile: On physician exam:      Previous GI history:       Past Medical/Social/Family History:  Past Medical History:   Diagnosis Date    Fatty liver     GERD (gastroesophageal reflux disease) 5/5/2016     Past Surgical History:   Procedure Laterality Date    HAND SURGERY      URETHRA SURGERY       History reviewed. No pertinent family history. Previous records/history/ and notes were reviewed    Allergies: Allergies   Allergen Reactions    Aspirin     Ciprofloxacin        Home medications:  Prior to Admission medications    Medication Sig Start Date End Date Taking? Authorizing Provider   ondansetron (ZOFRAN ODT) 4 MG disintegrating tablet Take 1 tablet by mouth every 8 hours as needed for Nausea  Patient not taking: Reported on 6/19/2023 10/18/16   Ita Austin DO   omeprazole (PRILOSEC) 20 MG capsule Take 1 capsule by mouth Daily  Patient not taking: Reported on 6/19/2023 5/5/16   Katy Paris MD     .  Current Medications:  Scheduled Meds:   pantoprazole (PROTONIX) 40 mg injection  40 mg IntraVENous Q12H    sucralfate  1 g Oral 4 times per day    sodium chloride flush  5-40 mL IntraVENous 2 times per day    bisacodyl  10 mg Rectal Once     . Continuous Infusions:   sodium chloride      sodium chloride 75 mL/hr at 06/19/23 2210     .   PRN Meds:sodium chloride flush, sodium chloride, potassium chloride **OR** potassium alternative oral replacement **OR** potassium chloride, magnesium sulfate, ondansetron **OR** ondansetron, acetaminophen
prophylactic vaccination with Streptococcus pneumoniae (Pneumococcus) and Influenza vaccines    Smoking    Abdominal pain       PLAN  -Pain and nausea control PRN  -Monitor vitals per unit standard  -Encourage IS, pulmonary hygiene, and ambulation  -Monitor I/O's  -NPO with sips and med  -NG tube for decompression, LIWS  -Added daily protonix  -Obtain labs (CEA, , AFP, pro-calcitonin, CRP, Albumin, CMP)and follow up  -Pending MRI read. Since the MRI was poor quality. Will obtain CT of abdomen per Dr Hilario Roa protocol to further evaluate the possible pancreatic mass vs inflammatory process only  -Recommend to have PICC placement tmr and start TPN to enhance his nutritional status if pancreatic mass is confirmed.   -Recommend to have GI for EGD to work up for gastric/duodenal outlet obstruction vs duodenitis  -Okay for VTE prophylaxis from surgery standpoint  -Remainder of care and disposition per primary team.    Electronically signed by Sulema Henriquez DO on 6/19/2023 at 8:58 PM  General Surgery Resident, PGY-1    Attending Physician Statement  I have discussed the case with Dr Lashell Phillips, including pertinent history and exam findings with the resident. I have seen and examined the patient and the key elements of the encounter have been performed by me. I agree with the assessment, plan and orders as documented by the resident.       Electronically signed by Santosh Devi IV, DO  on 6/21/2023 at 10:04 AM

## 2023-06-20 NOTE — CARE COORDINATION
Met with pt after receiving a social work consult from the [address] [place] [address] for housing resources. Pt states that he rents a room now but would like to be in a nicer home. He states that he receives SSI. Provided a list of low income housing complexes. Pt asked if SW knew of a house where he could rent the master bedroom. Explained that SW does not have information on rooms for rent. Pt stated that he would look into the housing complex list provided.

## 2023-06-21 ENCOUNTER — APPOINTMENT (OUTPATIENT)
Dept: ULTRASOUND IMAGING | Age: 58
DRG: 392 | End: 2023-06-21
Payer: MEDICARE

## 2023-06-30 ENCOUNTER — HOSPITAL ENCOUNTER (INPATIENT)
Age: 58
LOS: 2 days | Discharge: LEFT BEFORE DISCHARGE | DRG: 438 | End: 2023-07-03
Attending: EMERGENCY MEDICINE | Admitting: STUDENT IN AN ORGANIZED HEALTH CARE EDUCATION/TRAINING PROGRAM
Payer: MEDICARE

## 2023-06-30 ENCOUNTER — APPOINTMENT (OUTPATIENT)
Dept: GENERAL RADIOLOGY | Age: 58
DRG: 438 | End: 2023-06-30
Payer: MEDICARE

## 2023-06-30 DIAGNOSIS — K85.90 ACUTE PANCREATITIS, UNSPECIFIED COMPLICATION STATUS, UNSPECIFIED PANCREATITIS TYPE: Primary | ICD-10-CM

## 2023-06-30 LAB
ALBUMIN SERPL-MCNC: 4.2 G/DL (ref 3.5–5.2)
ALBUMIN/GLOB SERPL: 1.6 {RATIO} (ref 1–2.5)
ALP SERPL-CCNC: 108 U/L (ref 40–129)
ALT SERPL-CCNC: 32 U/L (ref 5–41)
ANION GAP SERPL CALCULATED.3IONS-SCNC: 11 MMOL/L (ref 9–17)
AST SERPL-CCNC: 59 U/L
BASOPHILS # BLD: 0.06 K/UL (ref 0–0.2)
BASOPHILS NFR BLD: 1 % (ref 0–2)
BILIRUB SERPL-MCNC: 0.7 MG/DL (ref 0.3–1.2)
BUN SERPL-MCNC: 14 MG/DL (ref 6–20)
CALCIUM SERPL-MCNC: 9.8 MG/DL (ref 8.6–10.4)
CHLORIDE SERPL-SCNC: 102 MMOL/L (ref 98–107)
CO2 SERPL-SCNC: 26 MMOL/L (ref 20–31)
CREAT SERPL-MCNC: 0.84 MG/DL (ref 0.7–1.2)
EOSINOPHIL # BLD: 0.1 K/UL (ref 0–0.44)
EOSINOPHILS RELATIVE PERCENT: 1 % (ref 1–4)
ERYTHROCYTE [DISTWIDTH] IN BLOOD BY AUTOMATED COUNT: 13.6 % (ref 11.8–14.4)
GFR SERPL CREATININE-BSD FRML MDRD: >60 ML/MIN/1.73M2
GLUCOSE SERPL-MCNC: 79 MG/DL (ref 70–99)
HCT VFR BLD AUTO: 40.6 % (ref 40.7–50.3)
HGB BLD-MCNC: 13.6 G/DL (ref 13–17)
IMM GRANULOCYTES # BLD AUTO: 0.03 K/UL (ref 0–0.3)
IMM GRANULOCYTES NFR BLD: 0 %
LIPASE SERPL-CCNC: 206 U/L (ref 13–60)
LYMPHOCYTES # BLD: 29 % (ref 24–43)
LYMPHOCYTES NFR BLD: 3.08 K/UL (ref 1.1–3.7)
MAGNESIUM SERPL-MCNC: 2.2 MG/DL (ref 1.6–2.6)
MCH RBC QN AUTO: 28.2 PG (ref 25.2–33.5)
MCHC RBC AUTO-ENTMCNC: 33.5 G/DL (ref 28.4–34.8)
MCV RBC AUTO: 84.2 FL (ref 82.6–102.9)
MONOCYTES NFR BLD: 0.91 K/UL (ref 0.1–1.2)
MONOCYTES NFR BLD: 9 % (ref 3–12)
NEUTROPHILS NFR BLD: 60 % (ref 36–65)
NEUTS SEG NFR BLD: 6.56 K/UL (ref 1.5–8.1)
NRBC BLD-RTO: 0 PER 100 WBC
PLATELET # BLD AUTO: 416 K/UL (ref 138–453)
PMV BLD AUTO: 9.2 FL (ref 8.1–13.5)
POTASSIUM SERPL-SCNC: 3.8 MMOL/L (ref 3.7–5.3)
PROT SERPL-MCNC: 6.9 G/DL (ref 6.4–8.3)
RBC # BLD AUTO: 4.82 M/UL (ref 4.21–5.77)
SODIUM SERPL-SCNC: 139 MMOL/L (ref 135–144)
WBC OTHER # BLD: 10.7 K/UL (ref 3.5–11.3)

## 2023-06-30 PROCEDURE — 6370000000 HC RX 637 (ALT 250 FOR IP): Performed by: EMERGENCY MEDICINE

## 2023-06-30 PROCEDURE — A4216 STERILE WATER/SALINE, 10 ML: HCPCS | Performed by: EMERGENCY MEDICINE

## 2023-06-30 PROCEDURE — 83735 ASSAY OF MAGNESIUM: CPT

## 2023-06-30 PROCEDURE — 83690 ASSAY OF LIPASE: CPT

## 2023-06-30 PROCEDURE — 2500000003 HC RX 250 WO HCPCS: Performed by: EMERGENCY MEDICINE

## 2023-06-30 PROCEDURE — 96374 THER/PROPH/DIAG INJ IV PUSH: CPT

## 2023-06-30 PROCEDURE — 93005 ELECTROCARDIOGRAM TRACING: CPT | Performed by: EMERGENCY MEDICINE

## 2023-06-30 PROCEDURE — 99285 EMERGENCY DEPT VISIT HI MDM: CPT

## 2023-06-30 PROCEDURE — 80053 COMPREHEN METABOLIC PANEL: CPT

## 2023-06-30 PROCEDURE — 85027 COMPLETE CBC AUTOMATED: CPT

## 2023-06-30 PROCEDURE — 2580000003 HC RX 258: Performed by: EMERGENCY MEDICINE

## 2023-06-30 PROCEDURE — 84484 ASSAY OF TROPONIN QUANT: CPT

## 2023-06-30 PROCEDURE — 71046 X-RAY EXAM CHEST 2 VIEWS: CPT

## 2023-06-30 RX ORDER — MAGNESIUM HYDROXIDE/ALUMINUM HYDROXICE/SIMETHICONE 120; 1200; 1200 MG/30ML; MG/30ML; MG/30ML
30 SUSPENSION ORAL ONCE
Status: COMPLETED | OUTPATIENT
Start: 2023-06-30 | End: 2023-06-30

## 2023-06-30 RX ADMIN — ALUMINUM HYDROXIDE, MAGNESIUM HYDROXIDE, AND SIMETHICONE 30 ML: 200; 200; 20 SUSPENSION ORAL at 23:32

## 2023-06-30 RX ADMIN — FAMOTIDINE 20 MG: 10 INJECTION, SOLUTION INTRAVENOUS at 23:32

## 2023-06-30 ASSESSMENT — ENCOUNTER SYMPTOMS
NAUSEA: 1
SHORTNESS OF BREATH: 1
ABDOMINAL PAIN: 1
VOMITING: 0

## 2023-06-30 ASSESSMENT — PAIN DESCRIPTION - DESCRIPTORS: DESCRIPTORS: THROBBING

## 2023-06-30 ASSESSMENT — PAIN DESCRIPTION - ORIENTATION: ORIENTATION: MID

## 2023-06-30 ASSESSMENT — PAIN DESCRIPTION - FREQUENCY: FREQUENCY: CONTINUOUS

## 2023-06-30 ASSESSMENT — PAIN - FUNCTIONAL ASSESSMENT: PAIN_FUNCTIONAL_ASSESSMENT: 0-10

## 2023-06-30 ASSESSMENT — PAIN DESCRIPTION - PAIN TYPE: TYPE: ACUTE PAIN

## 2023-06-30 ASSESSMENT — PAIN DESCRIPTION - LOCATION: LOCATION: CHEST

## 2023-06-30 ASSESSMENT — PAIN SCALES - GENERAL: PAINLEVEL_OUTOF10: 10

## 2023-07-01 PROBLEM — F20.9 SCHIZOPHRENIA (HCC): Status: ACTIVE | Noted: 2023-07-01

## 2023-07-01 PROBLEM — K85.90 PANCREATITIS, UNSPECIFIED PANCREATITIS TYPE: Status: ACTIVE | Noted: 2023-07-01

## 2023-07-01 LAB
CA-I BLD-SCNC: 1.24 MMOL/L (ref 1.13–1.33)
CRP SERPL HS-MCNC: 6.8 MG/L (ref 0–5)
EKG ATRIAL RATE: 82 BPM
EKG P AXIS: 83 DEGREES
EKG P-R INTERVAL: 148 MS
EKG Q-T INTERVAL: 388 MS
EKG QRS DURATION: 82 MS
EKG QTC CALCULATION (BAZETT): 453 MS
EKG R AXIS: 85 DEGREES
EKG T AXIS: 70 DEGREES
EKG VENTRICULAR RATE: 82 BPM
ERYTHROCYTE [SEDIMENTATION RATE] IN BLOOD BY WESTERGREN METHOD: 4 MM/HR (ref 0–20)
MAGNESIUM SERPL-MCNC: 2.4 MG/DL (ref 1.6–2.6)
PHOSPHATE SERPL-MCNC: 3.7 MG/DL (ref 2.5–4.5)
REASON FOR REJECTION: NORMAL
SPECIMEN SOURCE: NORMAL
TRIGL SERPL-MCNC: 82 MG/DL
TROPONIN I SERPL HS-MCNC: 7 NG/L (ref 0–22)
TROPONIN I SERPL HS-MCNC: 9 NG/L (ref 0–22)
ZZ NTE CLEAN UP: ORDERED TEST: NORMAL

## 2023-07-01 PROCEDURE — 1200000000 HC SEMI PRIVATE

## 2023-07-01 PROCEDURE — 86140 C-REACTIVE PROTEIN: CPT

## 2023-07-01 PROCEDURE — 36415 COLL VENOUS BLD VENIPUNCTURE: CPT

## 2023-07-01 PROCEDURE — 2580000003 HC RX 258: Performed by: NURSE PRACTITIONER

## 2023-07-01 PROCEDURE — 6360000002 HC RX W HCPCS: Performed by: EMERGENCY MEDICINE

## 2023-07-01 PROCEDURE — 84478 ASSAY OF TRIGLYCERIDES: CPT

## 2023-07-01 PROCEDURE — 2580000003 HC RX 258: Performed by: STUDENT IN AN ORGANIZED HEALTH CARE EDUCATION/TRAINING PROGRAM

## 2023-07-01 PROCEDURE — 6360000002 HC RX W HCPCS: Performed by: NURSE PRACTITIONER

## 2023-07-01 PROCEDURE — 84484 ASSAY OF TROPONIN QUANT: CPT

## 2023-07-01 PROCEDURE — 6370000000 HC RX 637 (ALT 250 FOR IP): Performed by: STUDENT IN AN ORGANIZED HEALTH CARE EDUCATION/TRAINING PROGRAM

## 2023-07-01 PROCEDURE — 82330 ASSAY OF CALCIUM: CPT

## 2023-07-01 PROCEDURE — 85652 RBC SED RATE AUTOMATED: CPT

## 2023-07-01 PROCEDURE — 84100 ASSAY OF PHOSPHORUS: CPT

## 2023-07-01 PROCEDURE — 99222 1ST HOSP IP/OBS MODERATE 55: CPT | Performed by: STUDENT IN AN ORGANIZED HEALTH CARE EDUCATION/TRAINING PROGRAM

## 2023-07-01 PROCEDURE — 93010 ELECTROCARDIOGRAM REPORT: CPT | Performed by: INTERNAL MEDICINE

## 2023-07-01 PROCEDURE — 83735 ASSAY OF MAGNESIUM: CPT

## 2023-07-01 PROCEDURE — C9113 INJ PANTOPRAZOLE SODIUM, VIA: HCPCS | Performed by: STUDENT IN AN ORGANIZED HEALTH CARE EDUCATION/TRAINING PROGRAM

## 2023-07-01 PROCEDURE — 6360000002 HC RX W HCPCS: Performed by: STUDENT IN AN ORGANIZED HEALTH CARE EDUCATION/TRAINING PROGRAM

## 2023-07-01 RX ORDER — SODIUM CHLORIDE, SODIUM LACTATE, POTASSIUM CHLORIDE, CALCIUM CHLORIDE 600; 310; 30; 20 MG/100ML; MG/100ML; MG/100ML; MG/100ML
INJECTION, SOLUTION INTRAVENOUS CONTINUOUS
Status: ACTIVE | OUTPATIENT
Start: 2023-07-01 | End: 2023-07-02

## 2023-07-01 RX ORDER — SODIUM CHLORIDE, SODIUM LACTATE, POTASSIUM CHLORIDE, AND CALCIUM CHLORIDE .6; .31; .03; .02 G/100ML; G/100ML; G/100ML; G/100ML
2000 INJECTION, SOLUTION INTRAVENOUS ONCE
Status: COMPLETED | OUTPATIENT
Start: 2023-07-01 | End: 2023-07-01

## 2023-07-01 RX ORDER — SUCRALFATE 1 G/1
1 TABLET ORAL EVERY 6 HOURS SCHEDULED
Status: DISCONTINUED | OUTPATIENT
Start: 2023-07-01 | End: 2023-07-03 | Stop reason: HOSPADM

## 2023-07-01 RX ORDER — FENTANYL CITRATE 50 UG/ML
50 INJECTION, SOLUTION INTRAMUSCULAR; INTRAVENOUS ONCE
Status: COMPLETED | OUTPATIENT
Start: 2023-07-01 | End: 2023-07-01

## 2023-07-01 RX ORDER — SODIUM CHLORIDE 0.9 % (FLUSH) 0.9 %
5-40 SYRINGE (ML) INJECTION PRN
Status: DISCONTINUED | OUTPATIENT
Start: 2023-07-01 | End: 2023-07-03 | Stop reason: HOSPADM

## 2023-07-01 RX ORDER — ONDANSETRON 2 MG/ML
4 INJECTION INTRAMUSCULAR; INTRAVENOUS EVERY 6 HOURS PRN
Status: DISCONTINUED | OUTPATIENT
Start: 2023-07-01 | End: 2023-07-03 | Stop reason: HOSPADM

## 2023-07-01 RX ORDER — SODIUM CHLORIDE 0.9 % (FLUSH) 0.9 %
5-40 SYRINGE (ML) INJECTION EVERY 12 HOURS SCHEDULED
Status: DISCONTINUED | OUTPATIENT
Start: 2023-07-01 | End: 2023-07-03 | Stop reason: HOSPADM

## 2023-07-01 RX ORDER — SODIUM CHLORIDE 9 MG/ML
INJECTION, SOLUTION INTRAVENOUS PRN
Status: DISCONTINUED | OUTPATIENT
Start: 2023-07-01 | End: 2023-07-03 | Stop reason: HOSPADM

## 2023-07-01 RX ORDER — SODIUM CHLORIDE 9 MG/ML
INJECTION, SOLUTION INTRAVENOUS CONTINUOUS
Status: DISCONTINUED | OUTPATIENT
Start: 2023-07-01 | End: 2023-07-01

## 2023-07-01 RX ORDER — BETAMETHASONE DIPROPIONATE 0.05 %
OINTMENT (GRAM) TOPICAL 2 TIMES DAILY
Status: DISCONTINUED | OUTPATIENT
Start: 2023-07-01 | End: 2023-07-03 | Stop reason: HOSPADM

## 2023-07-01 RX ORDER — QUETIAPINE FUMARATE 25 MG/1
25 TABLET, FILM COATED ORAL 2 TIMES DAILY
Status: DISCONTINUED | OUTPATIENT
Start: 2023-07-01 | End: 2023-07-03 | Stop reason: HOSPADM

## 2023-07-01 RX ORDER — PANTOPRAZOLE SODIUM 40 MG/1
40 TABLET, DELAYED RELEASE ORAL
Status: DISCONTINUED | OUTPATIENT
Start: 2023-07-01 | End: 2023-07-01

## 2023-07-01 RX ORDER — ONDANSETRON 4 MG/1
4 TABLET, ORALLY DISINTEGRATING ORAL EVERY 8 HOURS PRN
Status: DISCONTINUED | OUTPATIENT
Start: 2023-07-01 | End: 2023-07-03 | Stop reason: HOSPADM

## 2023-07-01 RX ORDER — MAGNESIUM SULFATE 1 G/100ML
1000 INJECTION INTRAVENOUS PRN
Status: DISCONTINUED | OUTPATIENT
Start: 2023-07-01 | End: 2023-07-03 | Stop reason: HOSPADM

## 2023-07-01 RX ORDER — POTASSIUM CHLORIDE 7.45 MG/ML
10 INJECTION INTRAVENOUS PRN
Status: DISCONTINUED | OUTPATIENT
Start: 2023-07-01 | End: 2023-07-03 | Stop reason: HOSPADM

## 2023-07-01 RX ORDER — ENOXAPARIN SODIUM 100 MG/ML
40 INJECTION SUBCUTANEOUS DAILY
Status: DISCONTINUED | OUTPATIENT
Start: 2023-07-01 | End: 2023-07-03 | Stop reason: HOSPADM

## 2023-07-01 RX ADMIN — HYDROMORPHONE HYDROCHLORIDE 0.5 MG: 1 INJECTION, SOLUTION INTRAMUSCULAR; INTRAVENOUS; SUBCUTANEOUS at 18:09

## 2023-07-01 RX ADMIN — ENOXAPARIN SODIUM 40 MG: 40 INJECTION SUBCUTANEOUS at 07:51

## 2023-07-01 RX ADMIN — SODIUM CHLORIDE, PRESERVATIVE FREE 40 MG: 5 INJECTION INTRAVENOUS at 09:55

## 2023-07-01 RX ADMIN — HYDROMORPHONE HYDROCHLORIDE 0.5 MG: 1 INJECTION, SOLUTION INTRAMUSCULAR; INTRAVENOUS; SUBCUTANEOUS at 21:10

## 2023-07-01 RX ADMIN — SUCRALFATE 1 G: 1 TABLET ORAL at 13:19

## 2023-07-01 RX ADMIN — QUETIAPINE FUMARATE 25 MG: 25 TABLET ORAL at 21:05

## 2023-07-01 RX ADMIN — SODIUM CHLORIDE: 9 INJECTION, SOLUTION INTRAVENOUS at 02:49

## 2023-07-01 RX ADMIN — QUETIAPINE FUMARATE 25 MG: 25 TABLET ORAL at 09:55

## 2023-07-01 RX ADMIN — BETAMETHASONE DIPROPIONATE: 0.5 OINTMENT TOPICAL at 11:04

## 2023-07-01 RX ADMIN — HYDROMORPHONE HYDROCHLORIDE 0.5 MG: 1 INJECTION, SOLUTION INTRAMUSCULAR; INTRAVENOUS; SUBCUTANEOUS at 11:03

## 2023-07-01 RX ADMIN — HYDROMORPHONE HYDROCHLORIDE 0.5 MG: 1 INJECTION, SOLUTION INTRAMUSCULAR; INTRAVENOUS; SUBCUTANEOUS at 04:40

## 2023-07-01 RX ADMIN — SODIUM CHLORIDE, POTASSIUM CHLORIDE, SODIUM LACTATE AND CALCIUM CHLORIDE: 600; 310; 30; 20 INJECTION, SOLUTION INTRAVENOUS at 20:42

## 2023-07-01 RX ADMIN — BETAMETHASONE DIPROPIONATE: 0.5 OINTMENT TOPICAL at 21:05

## 2023-07-01 RX ADMIN — SODIUM CHLORIDE, PRESERVATIVE FREE 40 MG: 5 INJECTION INTRAVENOUS at 21:05

## 2023-07-01 RX ADMIN — FENTANYL CITRATE 50 MCG: 50 INJECTION, SOLUTION INTRAMUSCULAR; INTRAVENOUS at 02:16

## 2023-07-01 RX ADMIN — HYDROMORPHONE HYDROCHLORIDE 0.5 MG: 1 INJECTION, SOLUTION INTRAMUSCULAR; INTRAVENOUS; SUBCUTANEOUS at 14:54

## 2023-07-01 RX ADMIN — SODIUM CHLORIDE, POTASSIUM CHLORIDE, SODIUM LACTATE AND CALCIUM CHLORIDE 2000 ML: 600; 310; 30; 20 INJECTION, SOLUTION INTRAVENOUS at 18:15

## 2023-07-01 RX ADMIN — HYDROMORPHONE HYDROCHLORIDE 0.5 MG: 1 INJECTION, SOLUTION INTRAMUSCULAR; INTRAVENOUS; SUBCUTANEOUS at 07:50

## 2023-07-01 ASSESSMENT — ENCOUNTER SYMPTOMS
BACK PAIN: 0
CONSTIPATION: 0
VOMITING: 0
EYE REDNESS: 0
ABDOMINAL PAIN: 1
NAUSEA: 1
DIARRHEA: 0
CHOKING: 0
COUGH: 0
CHEST TIGHTNESS: 0
APNEA: 0
EYE PAIN: 0

## 2023-07-01 ASSESSMENT — PAIN SCALES - GENERAL
PAINLEVEL_OUTOF10: 10
PAINLEVEL_OUTOF10: 10
PAINLEVEL_OUTOF10: 9
PAINLEVEL_OUTOF10: 10
PAINLEVEL_OUTOF10: 8
PAINLEVEL_OUTOF10: 9
PAINLEVEL_OUTOF10: 10
PAINLEVEL_OUTOF10: 10

## 2023-07-02 ENCOUNTER — APPOINTMENT (OUTPATIENT)
Dept: MRI IMAGING | Age: 58
DRG: 438 | End: 2023-07-02
Payer: MEDICARE

## 2023-07-02 LAB
ALBUMIN SERPL-MCNC: 3 G/DL (ref 3.5–5.2)
ALBUMIN/GLOB SERPL: 1.2 {RATIO} (ref 1–2.5)
ALP SERPL-CCNC: 112 U/L (ref 40–129)
ALT SERPL-CCNC: 40 U/L (ref 5–41)
ANION GAP SERPL CALCULATED.3IONS-SCNC: 7 MMOL/L (ref 9–17)
AST SERPL-CCNC: 29 U/L
BASOPHILS # BLD: 0.05 K/UL (ref 0–0.2)
BASOPHILS NFR BLD: 1 % (ref 0–2)
BILIRUB DIRECT SERPL-MCNC: 0.2 MG/DL
BILIRUB INDIRECT SERPL-MCNC: 0.4 MG/DL (ref 0–1)
BILIRUB SERPL-MCNC: 0.6 MG/DL (ref 0.3–1.2)
BUN SERPL-MCNC: 8 MG/DL (ref 6–20)
CALCIUM SERPL-MCNC: 9.1 MG/DL (ref 8.6–10.4)
CHLORIDE SERPL-SCNC: 108 MMOL/L (ref 98–107)
CO2 SERPL-SCNC: 24 MMOL/L (ref 20–31)
CREAT SERPL-MCNC: 0.85 MG/DL (ref 0.7–1.2)
CRP SERPL HS-MCNC: 12.2 MG/L (ref 0–5)
EOSINOPHIL # BLD: 0.12 K/UL (ref 0–0.44)
EOSINOPHILS RELATIVE PERCENT: 1 % (ref 1–4)
ERYTHROCYTE [DISTWIDTH] IN BLOOD BY AUTOMATED COUNT: 13.8 % (ref 11.8–14.4)
ERYTHROCYTE [SEDIMENTATION RATE] IN BLOOD BY WESTERGREN METHOD: 3 MM/HR (ref 0–20)
GFR SERPL CREATININE-BSD FRML MDRD: >60 ML/MIN/1.73M2
GLUCOSE SERPL-MCNC: 76 MG/DL (ref 70–99)
HCT VFR BLD AUTO: 38.5 % (ref 40.7–50.3)
HGB BLD-MCNC: 12.2 G/DL (ref 13–17)
IMM GRANULOCYTES # BLD AUTO: <0.03 K/UL (ref 0–0.3)
IMM GRANULOCYTES NFR BLD: 0 %
LIPASE SERPL-CCNC: 82 U/L (ref 13–60)
LYMPHOCYTES # BLD: 20 % (ref 24–43)
LYMPHOCYTES NFR BLD: 1.78 K/UL (ref 1.1–3.7)
MAGNESIUM SERPL-MCNC: 1.8 MG/DL (ref 1.6–2.6)
MCH RBC QN AUTO: 27.9 PG (ref 25.2–33.5)
MCHC RBC AUTO-ENTMCNC: 31.7 G/DL (ref 28.4–34.8)
MCV RBC AUTO: 88.1 FL (ref 82.6–102.9)
MONOCYTES NFR BLD: 0.81 K/UL (ref 0.1–1.2)
MONOCYTES NFR BLD: 9 % (ref 3–12)
NEUTROPHILS NFR BLD: 69 % (ref 36–65)
NEUTS SEG NFR BLD: 5.99 K/UL (ref 1.5–8.1)
NRBC BLD-RTO: 0 PER 100 WBC
PHOSPHATE SERPL-MCNC: 3 MG/DL (ref 2.5–4.5)
PLATELET # BLD AUTO: 339 K/UL (ref 138–453)
PMV BLD AUTO: 8.9 FL (ref 8.1–13.5)
POTASSIUM SERPL-SCNC: 4.2 MMOL/L (ref 3.7–5.3)
PROT SERPL-MCNC: 5.6 G/DL (ref 6.4–8.3)
RBC # BLD AUTO: 4.37 M/UL (ref 4.21–5.77)
SODIUM SERPL-SCNC: 139 MMOL/L (ref 135–144)
WBC OTHER # BLD: 8.8 K/UL (ref 3.5–11.3)

## 2023-07-02 PROCEDURE — 6360000002 HC RX W HCPCS: Performed by: STUDENT IN AN ORGANIZED HEALTH CARE EDUCATION/TRAINING PROGRAM

## 2023-07-02 PROCEDURE — C9113 INJ PANTOPRAZOLE SODIUM, VIA: HCPCS | Performed by: STUDENT IN AN ORGANIZED HEALTH CARE EDUCATION/TRAINING PROGRAM

## 2023-07-02 PROCEDURE — 83735 ASSAY OF MAGNESIUM: CPT

## 2023-07-02 PROCEDURE — 80048 BASIC METABOLIC PNL TOTAL CA: CPT

## 2023-07-02 PROCEDURE — 86140 C-REACTIVE PROTEIN: CPT

## 2023-07-02 PROCEDURE — 6360000004 HC RX CONTRAST MEDICATION: Performed by: NURSE PRACTITIONER

## 2023-07-02 PROCEDURE — 2580000003 HC RX 258: Performed by: STUDENT IN AN ORGANIZED HEALTH CARE EDUCATION/TRAINING PROGRAM

## 2023-07-02 PROCEDURE — 84100 ASSAY OF PHOSPHORUS: CPT

## 2023-07-02 PROCEDURE — APPSS30 APP SPLIT SHARED TIME 16-30 MINUTES: Performed by: NURSE PRACTITIONER

## 2023-07-02 PROCEDURE — 80076 HEPATIC FUNCTION PANEL: CPT

## 2023-07-02 PROCEDURE — 99232 SBSQ HOSP IP/OBS MODERATE 35: CPT | Performed by: STUDENT IN AN ORGANIZED HEALTH CARE EDUCATION/TRAINING PROGRAM

## 2023-07-02 PROCEDURE — APPNB15 APP NON BILLABLE TIME 0-15 MINS: Performed by: NURSE PRACTITIONER

## 2023-07-02 PROCEDURE — 74183 MRI ABD W/O CNTR FLWD CNTR: CPT

## 2023-07-02 PROCEDURE — 36415 COLL VENOUS BLD VENIPUNCTURE: CPT

## 2023-07-02 PROCEDURE — 6370000000 HC RX 637 (ALT 250 FOR IP): Performed by: STUDENT IN AN ORGANIZED HEALTH CARE EDUCATION/TRAINING PROGRAM

## 2023-07-02 PROCEDURE — A9579 GAD-BASE MR CONTRAST NOS,1ML: HCPCS | Performed by: NURSE PRACTITIONER

## 2023-07-02 PROCEDURE — 1200000000 HC SEMI PRIVATE

## 2023-07-02 PROCEDURE — 83690 ASSAY OF LIPASE: CPT

## 2023-07-02 PROCEDURE — 6360000002 HC RX W HCPCS: Performed by: NURSE PRACTITIONER

## 2023-07-02 PROCEDURE — 85027 COMPLETE CBC AUTOMATED: CPT

## 2023-07-02 PROCEDURE — 85652 RBC SED RATE AUTOMATED: CPT

## 2023-07-02 RX ORDER — OXYCODONE HYDROCHLORIDE 5 MG/1
10 TABLET ORAL EVERY 4 HOURS PRN
Status: DISCONTINUED | OUTPATIENT
Start: 2023-07-02 | End: 2023-07-03 | Stop reason: HOSPADM

## 2023-07-02 RX ORDER — 0.9 % SODIUM CHLORIDE 0.9 %
30 INTRAVENOUS SOLUTION INTRAVENOUS ONCE
Status: DISCONTINUED | OUTPATIENT
Start: 2023-07-02 | End: 2023-07-03 | Stop reason: HOSPADM

## 2023-07-02 RX ADMIN — BETAMETHASONE DIPROPIONATE: 0.5 OINTMENT TOPICAL at 21:38

## 2023-07-02 RX ADMIN — SODIUM CHLORIDE, PRESERVATIVE FREE 40 MG: 5 INJECTION INTRAVENOUS at 09:27

## 2023-07-02 RX ADMIN — HYDROMORPHONE HYDROCHLORIDE 0.5 MG: 1 INJECTION, SOLUTION INTRAMUSCULAR; INTRAVENOUS; SUBCUTANEOUS at 12:37

## 2023-07-02 RX ADMIN — HYDROMORPHONE HYDROCHLORIDE 0.5 MG: 1 INJECTION, SOLUTION INTRAMUSCULAR; INTRAVENOUS; SUBCUTANEOUS at 03:10

## 2023-07-02 RX ADMIN — SUCRALFATE 1 G: 1 TABLET ORAL at 12:38

## 2023-07-02 RX ADMIN — HYDROMORPHONE HYDROCHLORIDE 0.5 MG: 1 INJECTION, SOLUTION INTRAMUSCULAR; INTRAVENOUS; SUBCUTANEOUS at 06:07

## 2023-07-02 RX ADMIN — GADOTERIDOL 15 ML: 279.3 INJECTION, SOLUTION INTRAVENOUS at 08:59

## 2023-07-02 RX ADMIN — HYDROMORPHONE HYDROCHLORIDE 0.5 MG: 1 INJECTION, SOLUTION INTRAMUSCULAR; INTRAVENOUS; SUBCUTANEOUS at 00:12

## 2023-07-02 RX ADMIN — SUCRALFATE 1 G: 1 TABLET ORAL at 06:06

## 2023-07-02 RX ADMIN — BETAMETHASONE DIPROPIONATE: 0.5 OINTMENT TOPICAL at 09:34

## 2023-07-02 RX ADMIN — ENOXAPARIN SODIUM 40 MG: 40 INJECTION SUBCUTANEOUS at 09:27

## 2023-07-02 RX ADMIN — SUCRALFATE 1 G: 1 TABLET ORAL at 18:51

## 2023-07-02 RX ADMIN — SUCRALFATE 1 G: 1 TABLET ORAL at 00:12

## 2023-07-02 RX ADMIN — SODIUM CHLORIDE, PRESERVATIVE FREE 40 MG: 5 INJECTION INTRAVENOUS at 21:50

## 2023-07-02 RX ADMIN — HYDROMORPHONE HYDROCHLORIDE 0.5 MG: 1 INJECTION, SOLUTION INTRAMUSCULAR; INTRAVENOUS; SUBCUTANEOUS at 09:28

## 2023-07-02 RX ADMIN — OXYCODONE HYDROCHLORIDE 10 MG: 5 TABLET ORAL at 15:06

## 2023-07-02 RX ADMIN — HYDROMORPHONE HYDROCHLORIDE 0.5 MG: 1 INJECTION, SOLUTION INTRAMUSCULAR; INTRAVENOUS; SUBCUTANEOUS at 22:37

## 2023-07-02 RX ADMIN — QUETIAPINE FUMARATE 25 MG: 25 TABLET ORAL at 20:47

## 2023-07-02 RX ADMIN — HYDROMORPHONE HYDROCHLORIDE 0.5 MG: 1 INJECTION, SOLUTION INTRAMUSCULAR; INTRAVENOUS; SUBCUTANEOUS at 16:09

## 2023-07-02 RX ADMIN — QUETIAPINE FUMARATE 25 MG: 25 TABLET ORAL at 09:27

## 2023-07-02 RX ADMIN — HYDROMORPHONE HYDROCHLORIDE 0.5 MG: 1 INJECTION, SOLUTION INTRAMUSCULAR; INTRAVENOUS; SUBCUTANEOUS at 19:35

## 2023-07-02 RX ADMIN — OXYCODONE HYDROCHLORIDE 10 MG: 5 TABLET ORAL at 20:48

## 2023-07-02 ASSESSMENT — PAIN SCALES - GENERAL
PAINLEVEL_OUTOF10: 9
PAINLEVEL_OUTOF10: 8
PAINLEVEL_OUTOF10: 8
PAINLEVEL_OUTOF10: 9
PAINLEVEL_OUTOF10: 10
PAINLEVEL_OUTOF10: 8
PAINLEVEL_OUTOF10: 9
PAINLEVEL_OUTOF10: 7
PAINLEVEL_OUTOF10: 8
PAINLEVEL_OUTOF10: 8

## 2023-07-02 ASSESSMENT — PAIN DESCRIPTION - LOCATION
LOCATION: ABDOMEN
LOCATION: ABDOMEN

## 2023-07-02 ASSESSMENT — PAIN SCALES - WONG BAKER: WONGBAKER_NUMERICALRESPONSE: 8

## 2023-07-02 ASSESSMENT — PAIN DESCRIPTION - ORIENTATION: ORIENTATION: MID

## 2023-07-02 ASSESSMENT — PAIN DESCRIPTION - DESCRIPTORS
DESCRIPTORS: STABBING;GNAWING
DESCRIPTORS: ACHING

## 2023-07-03 VITALS
BODY MASS INDEX: 22.9 KG/M2 | OXYGEN SATURATION: 98 % | HEIGHT: 70 IN | DIASTOLIC BLOOD PRESSURE: 78 MMHG | WEIGHT: 160 LBS | SYSTOLIC BLOOD PRESSURE: 113 MMHG | TEMPERATURE: 98.2 F | HEART RATE: 71 BPM | RESPIRATION RATE: 16 BRPM

## 2023-07-03 LAB
ALBUMIN SERPL-MCNC: 3 G/DL (ref 3.5–5.2)
ALBUMIN/GLOB SERPL: 1.2 {RATIO} (ref 1–2.5)
ALP SERPL-CCNC: 102 U/L (ref 40–129)
ALT SERPL-CCNC: 27 U/L (ref 5–41)
ANION GAP SERPL CALCULATED.3IONS-SCNC: 8 MMOL/L (ref 9–17)
AST SERPL-CCNC: 22 U/L
BASOPHILS # BLD: 0.05 K/UL (ref 0–0.2)
BASOPHILS NFR BLD: 1 % (ref 0–2)
BILIRUB SERPL-MCNC: 0.6 MG/DL (ref 0.3–1.2)
BUN SERPL-MCNC: 7 MG/DL (ref 6–20)
CALCIUM SERPL-MCNC: 9 MG/DL (ref 8.6–10.4)
CHLORIDE SERPL-SCNC: 106 MMOL/L (ref 98–107)
CO2 SERPL-SCNC: 23 MMOL/L (ref 20–31)
CREAT SERPL-MCNC: 0.76 MG/DL (ref 0.7–1.2)
CRP SERPL HS-MCNC: 37.1 MG/L (ref 0–5)
EOSINOPHIL # BLD: 0.18 K/UL (ref 0–0.44)
EOSINOPHILS RELATIVE PERCENT: 2 % (ref 1–4)
ERYTHROCYTE [DISTWIDTH] IN BLOOD BY AUTOMATED COUNT: 13.7 % (ref 11.8–14.4)
ERYTHROCYTE [SEDIMENTATION RATE] IN BLOOD BY WESTERGREN METHOD: 4 MM/HR (ref 0–20)
GFR SERPL CREATININE-BSD FRML MDRD: >60 ML/MIN/1.73M2
GLUCOSE SERPL-MCNC: 72 MG/DL (ref 70–99)
HCT VFR BLD AUTO: 40.8 % (ref 40.7–50.3)
HGB BLD-MCNC: 12.6 G/DL (ref 13–17)
IMM GRANULOCYTES # BLD AUTO: <0.03 K/UL (ref 0–0.3)
IMM GRANULOCYTES NFR BLD: 0 %
INR PPP: 1.1
LYMPHOCYTES # BLD: 28 % (ref 24–43)
LYMPHOCYTES NFR BLD: 2.65 K/UL (ref 1.1–3.7)
MAGNESIUM SERPL-MCNC: 2.1 MG/DL (ref 1.6–2.6)
MCH RBC QN AUTO: 27.9 PG (ref 25.2–33.5)
MCHC RBC AUTO-ENTMCNC: 30.9 G/DL (ref 28.4–34.8)
MCV RBC AUTO: 90.5 FL (ref 82.6–102.9)
MONOCYTES NFR BLD: 0.87 K/UL (ref 0.1–1.2)
MONOCYTES NFR BLD: 9 % (ref 3–12)
NEUTROPHILS NFR BLD: 60 % (ref 36–65)
NEUTS SEG NFR BLD: 5.74 K/UL (ref 1.5–8.1)
NRBC BLD-RTO: 0 PER 100 WBC
PHOSPHATE SERPL-MCNC: 3.3 MG/DL (ref 2.5–4.5)
PLATELET # BLD AUTO: 320 K/UL (ref 138–453)
PMV BLD AUTO: 9.6 FL (ref 8.1–13.5)
POTASSIUM SERPL-SCNC: 4.4 MMOL/L (ref 3.7–5.3)
PREALB SERPL-MCNC: 12.4 MG/DL (ref 20–40)
PROT SERPL-MCNC: 5.6 G/DL (ref 6.4–8.3)
PROTHROMBIN TIME: 13.6 SEC (ref 11.7–14.9)
RBC # BLD AUTO: 4.51 M/UL (ref 4.21–5.77)
SODIUM SERPL-SCNC: 137 MMOL/L (ref 135–144)
WBC OTHER # BLD: 9.5 K/UL (ref 3.5–11.3)

## 2023-07-03 PROCEDURE — 80053 COMPREHEN METABOLIC PANEL: CPT

## 2023-07-03 PROCEDURE — 84134 ASSAY OF PREALBUMIN: CPT

## 2023-07-03 PROCEDURE — 85610 PROTHROMBIN TIME: CPT

## 2023-07-03 PROCEDURE — 36415 COLL VENOUS BLD VENIPUNCTURE: CPT

## 2023-07-03 PROCEDURE — 84100 ASSAY OF PHOSPHORUS: CPT

## 2023-07-03 PROCEDURE — 6370000000 HC RX 637 (ALT 250 FOR IP): Performed by: STUDENT IN AN ORGANIZED HEALTH CARE EDUCATION/TRAINING PROGRAM

## 2023-07-03 PROCEDURE — 83735 ASSAY OF MAGNESIUM: CPT

## 2023-07-03 PROCEDURE — 86140 C-REACTIVE PROTEIN: CPT

## 2023-07-03 PROCEDURE — 85027 COMPLETE CBC AUTOMATED: CPT

## 2023-07-03 PROCEDURE — 85652 RBC SED RATE AUTOMATED: CPT

## 2023-07-03 PROCEDURE — 6360000002 HC RX W HCPCS: Performed by: NURSE PRACTITIONER

## 2023-07-03 RX ADMIN — HYDROMORPHONE HYDROCHLORIDE 0.5 MG: 1 INJECTION, SOLUTION INTRAMUSCULAR; INTRAVENOUS; SUBCUTANEOUS at 04:59

## 2023-07-03 RX ADMIN — HYDROMORPHONE HYDROCHLORIDE 0.5 MG: 1 INJECTION, SOLUTION INTRAMUSCULAR; INTRAVENOUS; SUBCUTANEOUS at 01:55

## 2023-07-03 RX ADMIN — OXYCODONE HYDROCHLORIDE 10 MG: 5 TABLET ORAL at 07:16

## 2023-07-03 RX ADMIN — SUCRALFATE 1 G: 1 TABLET ORAL at 00:41

## 2023-07-03 RX ADMIN — OXYCODONE HYDROCHLORIDE 10 MG: 5 TABLET ORAL at 00:41

## 2023-07-03 ASSESSMENT — PAIN DESCRIPTION - LOCATION
LOCATION: CHEST
LOCATION: ABDOMEN

## 2023-07-03 ASSESSMENT — PAIN SCALES - GENERAL
PAINLEVEL_OUTOF10: 9
PAINLEVEL_OUTOF10: 9
PAINLEVEL_OUTOF10: 0
PAINLEVEL_OUTOF10: 9
PAINLEVEL_OUTOF10: 8
PAINLEVEL_OUTOF10: 8

## 2023-07-03 ASSESSMENT — PAIN DESCRIPTION - DESCRIPTORS: DESCRIPTORS: ACHING

## 2023-07-03 ASSESSMENT — PAIN DESCRIPTION - ORIENTATION: ORIENTATION: LEFT

## 2023-07-03 NOTE — PLAN OF CARE
Problem: Discharge Planning  Goal: Discharge to home or other facility with appropriate resources  7/3/2023 0403 by Pasquale Lay RN  Outcome: Progressing  7/2/2023 1900 by Diana Barros RN  Outcome: Progressing     Problem: Pain  Goal: Verbalizes/displays adequate comfort level or baseline comfort level  7/3/2023 0403 by Pasquale Lay RN  Outcome: Progressing  7/2/2023 1900 by Diana Barros RN  Outcome: Progressing

## 2023-07-03 NOTE — PROGRESS NOTES
Patient was given AMA paperwork by night nurse. Patient states that he has bills to pay and rent that is due, so he needs to leave.

## 2023-07-03 NOTE — PROGRESS NOTES
Physician Progress Note      Shola Soto  [facility] #:                  [id number]  :                       1965  ADMIT DATE:       2023 10:58 PM  [address] [facility] DATE:        7/3/2023 7:36 AM  RESPONDING  PROVIDER #:        Olamide Romero MD          QUERY TEXT:    Patient admitted with Acute pancreatitis and pancreatic mass  . Noted to have   severe malnutrition noted per Dietician consult with body fat loss, weight   loss greater then 10% over 6 months and 75% or less estimated energy   requirements for 1 month or longer. . If possible, please document in progress   notes and discharge summary if you are evaluating and /or treating any of the   following: The medical record reflects the following:  Risk Factors:pancreatitis, pancreatic mass, age  Clinical Indicators: admitted with Acute pancreatitis and pancreatic mass  . Noted to have severe malnutrition noted per Dietician consult with body fat   loss, weight loss greater then 10% over 6 months and 75% or less estimated   energy requirements for 1 month or longer  Treatment: dietician consult, nutritional supplements, intake and output    ASPEN Criteria:    https://aspenjournals. onlinelibrary. wren. com/doi/full/10.1177/630666891463478  5    Thank Ursula Mendez RN BSN  CCDS  Email [email or web address] [phone]  office hours M-F 6am to 2:30pm  Options provided:  -- Protein calorie malnutrition severe  -- Severe malnutrition  -- Other - I will add my own diagnosis  -- Disagree - Not applicable / Not valid  -- Disagree - Clinically unable to determine / Unknown  -- Refer to Clinical Documentation Reviewer    PROVIDER RESPONSE TEXT:    This patient has severe protein calorie malnutrition. Query created by:  Preeti Jorge on 7/3/2023 1:59 PM      Electronically signed by:  Olamide Romero MD 7/3/2023 2:01 PM

## 2023-07-05 ENCOUNTER — TELEPHONE (OUTPATIENT)
Dept: GASTROENTEROLOGY | Age: 58
End: 2023-07-05

## 2023-07-05 NOTE — TELEPHONE ENCOUNTER
----- Message from KATYA Gilman CNP sent at 7/5/2023  8:47 AM EDT -----  Regarding: OP EUS/ERCP with stent placement  Pt was in house, planned for EUS/ERCP but left AMA. Pt showed to hospital today expecting procedure. OR schedule/Ayesha's schedule could not accommodate. Pt will need this scheduled as OP with Dr. Floyd Drummond or Dr. Jolie Sorto.   Pt has difficulty understanding/may need family support.    Suspect Pancreatic head mass  Then he will need office follow up  Thank you    Susan Espinoza CNP

## 2023-07-08 NOTE — DISCHARGE SUMMARY
[facility]  Office: [id number], DO, Richardson Suarez, DO, Jailyn , DO, Monie Diazford Blood, DO, Salvador Ann MD, Anaid Ott MD, Marianela Cole MD, Flip Varma MD,  Dinora Manning MD, Letty Nava MD, Kinjal Romano, DO, Ginette Duarte MD,  Mariah Martínez MD, Jorge Willis MD, Keya Blackburn DO, Edwin Branham MD,  Courtney Ardon DO, Hina Rajan MD, Sharon Mcneal MD, Tacos Rizo MD, Katie Barry MD,  Brooks Allen MD, So Arce MD, Byron Santiago DO, Kiko Frey MD,  Jose Daniel Best MD, Ricardo Roman, [facility],  Alvin Reynolds, CNP, Chay Navarro, CNP, Anna Hardy, CNP,  Emily Pruett, [facility], Alexandro Maldonado, CNP, Alisa Sifuentes, CNP, Patience Maciel, CNP, Marvin Bell, CNP, Gissell Zhang, [facility], Derrick Warren, PA-C, Baljeet Swanson, CNS, Austen Tubbs, CNP, Oneida Macarioman, [address] [place] [address]    Discharge Summary     Patient ID: Viviane Blanchard  :  1965   MRN: [id number]     ACCOUNT:  [de-identified]   Patient's PCP: No primary care provider on file. Admit Date: 2023   Discharge Date: 7/3/2023  Length of Stay: 2  Code Status:  Prior  Admitting Physician: Jorge Willis MD  Discharge Physician: Jorge Willis MD     Active Discharge Diagnoses:     Hospital Problem Lists:  Principal Problem:    Acute pancreatitis  Active Problems:    GERD (gastroesophageal reflux disease)    Smoking    Duodenitis    Tobacco use    Elevated CEA    Elevated CA 19-9 level    Schizophrenia ([address])  Resolved Problems:    * No resolved hospital problems. *      Admission Condition:  poor     Discharged Condition: fair    Hospital Stay:     Hospital Course:  Viviane Blanchard is a 66 y.o. male with a past medical history of known pancreatic mass (has not yet been biopsied) who presented to the emergency department on 2023 complaining of intractable abdominal pain.  The patient was found to have

## 2023-07-21 ENCOUNTER — HOSPITAL ENCOUNTER (INPATIENT)
Age: 58
LOS: 7 days | Discharge: HOME | End: 2023-07-28
Attending: EMERGENCY MEDICINE
Payer: MEDICARE

## 2023-07-21 ENCOUNTER — APPOINTMENT (OUTPATIENT)
Dept: CT IMAGING | Age: 58
End: 2023-07-21
Payer: MEDICARE

## 2023-07-21 ENCOUNTER — ANCILLARY PROCEDURE (OUTPATIENT)
Dept: EMERGENCY DEPT | Age: 58
End: 2023-07-21
Attending: EMERGENCY MEDICINE
Payer: MEDICARE

## 2023-07-21 ENCOUNTER — APPOINTMENT (OUTPATIENT)
Dept: GENERAL RADIOLOGY | Age: 58
End: 2023-07-21
Payer: MEDICARE

## 2023-07-21 DIAGNOSIS — K86.89 DILATION OF PANCREATIC DUCT: ICD-10-CM

## 2023-07-21 DIAGNOSIS — K86.89 PANCREATIC MASS: ICD-10-CM

## 2023-07-21 DIAGNOSIS — R00.0 TACHYCARDIA: Primary | ICD-10-CM

## 2023-07-21 DIAGNOSIS — R07.9 CHEST PAIN, UNSPECIFIED TYPE: ICD-10-CM

## 2023-07-21 DIAGNOSIS — K85.90 ACUTE PANCREATITIS, UNSPECIFIED COMPLICATION STATUS, UNSPECIFIED PANCREATITIS TYPE: ICD-10-CM

## 2023-07-21 PROBLEM — K83.8 DILATED BILE DUCT: Status: ACTIVE | Noted: 2023-07-21

## 2023-07-21 LAB
ABO + RH BLD: NORMAL
ALBUMIN SERPL-MCNC: 4.4 G/DL (ref 3.5–5.2)
ALBUMIN/GLOB SERPL: 1.5 {RATIO} (ref 1–2.5)
ALP SERPL-CCNC: 339 U/L (ref 40–129)
ALT SERPL-CCNC: 119 U/L (ref 5–41)
AMPHET UR QL SCN: NEGATIVE
ANION GAP SERPL CALCULATED.3IONS-SCNC: 12 MMOL/L (ref 9–17)
ARM BAND NUMBER: NORMAL
AST SERPL-CCNC: 174 U/L
BARBITURATES UR QL SCN: NEGATIVE
BASOPHILS # BLD: 0.08 K/UL (ref 0–0.2)
BASOPHILS NFR BLD: 1 % (ref 0–2)
BENZODIAZ UR QL: NEGATIVE
BILIRUB SERPL-MCNC: 1 MG/DL (ref 0.3–1.2)
BLOOD BANK SAMPLE EXPIRATION: NORMAL
BLOOD GROUP ANTIBODIES SERPL: NEGATIVE
BNP SERPL-MCNC: 98 PG/ML
BUN SERPL-MCNC: 14 MG/DL (ref 6–20)
CALCIUM SERPL-MCNC: 10.1 MG/DL (ref 8.6–10.4)
CANNABINOIDS UR QL SCN: NEGATIVE
CHLORIDE SERPL-SCNC: 100 MMOL/L (ref 98–107)
CO2 SERPL-SCNC: 29 MMOL/L (ref 20–31)
COCAINE UR QL SCN: NEGATIVE
CREAT SERPL-MCNC: 0.8 MG/DL (ref 0.7–1.2)
CRP SERPL HS-MCNC: <3 MG/L (ref 0–5)
D DIMER PPP FEU-MCNC: 0.93 UG/ML FEU (ref 0–0.57)
EKG ATRIAL RATE: 86 BPM
EKG P AXIS: 81 DEGREES
EKG P-R INTERVAL: 152 MS
EKG Q-T INTERVAL: 394 MS
EKG QRS DURATION: 84 MS
EKG QTC CALCULATION (BAZETT): 471 MS
EKG R AXIS: 88 DEGREES
EKG T AXIS: 61 DEGREES
EKG VENTRICULAR RATE: 86 BPM
EOSINOPHIL # BLD: 0.24 K/UL (ref 0–0.44)
EOSINOPHILS RELATIVE PERCENT: 2 % (ref 1–4)
ERYTHROCYTE [DISTWIDTH] IN BLOOD BY AUTOMATED COUNT: 14.6 % (ref 11.8–14.4)
ERYTHROCYTE [SEDIMENTATION RATE] IN BLOOD BY PHOTOMETRIC METHOD: 5 MM/HR (ref 0–20)
FENTANYL UR QL: NEGATIVE
GFR SERPL CREATININE-BSD FRML MDRD: >60 ML/MIN/1.73M2
GLUCOSE SERPL-MCNC: 87 MG/DL (ref 70–99)
HCT VFR BLD AUTO: 43.7 % (ref 40.7–50.3)
HGB BLD-MCNC: 14.5 G/DL (ref 13–17)
IMM GRANULOCYTES # BLD AUTO: <0.03 K/UL (ref 0–0.3)
IMM GRANULOCYTES NFR BLD: 0 %
LIPASE SERPL-CCNC: 632 U/L (ref 13–60)
LYMPHOCYTES NFR BLD: 3.65 K/UL (ref 1.1–3.7)
LYMPHOCYTES RELATIVE PERCENT: 35 % (ref 24–43)
MAGNESIUM SERPL-MCNC: 2.2 MG/DL (ref 1.6–2.6)
MCH RBC QN AUTO: 28.7 PG (ref 25.2–33.5)
MCHC RBC AUTO-ENTMCNC: 33.2 G/DL (ref 28.4–34.8)
MCV RBC AUTO: 86.4 FL (ref 82.6–102.9)
METHADONE UR QL: NEGATIVE
MONOCYTES NFR BLD: 0.93 K/UL (ref 0.1–1.2)
MONOCYTES NFR BLD: 9 % (ref 3–12)
NEUTROPHILS NFR BLD: 53 % (ref 36–65)
NEUTS SEG NFR BLD: 5.41 K/UL (ref 1.5–8.1)
NRBC BLD-RTO: 0 PER 100 WBC
OPIATES UR QL SCN: POSITIVE
OXYCODONE UR QL SCN: NEGATIVE
PCP UR QL SCN: NEGATIVE
PHOSPHATE SERPL-MCNC: 3.6 MG/DL (ref 2.5–4.5)
PLATELET # BLD AUTO: 447 K/UL (ref 138–453)
PMV BLD AUTO: 9.7 FL (ref 8.1–13.5)
POTASSIUM SERPL-SCNC: 3.9 MMOL/L (ref 3.7–5.3)
PROCALCITONIN SERPL-MCNC: 0.07 NG/ML
PROT SERPL-MCNC: 7.4 G/DL (ref 6.4–8.3)
RBC # BLD AUTO: 5.06 M/UL (ref 4.21–5.77)
RBC # BLD: ABNORMAL 10*6/UL
SODIUM SERPL-SCNC: 141 MMOL/L (ref 135–144)
TEST INFORMATION: ABNORMAL
TROPONIN I SERPL HS-MCNC: 10 NG/L (ref 0–22)
TROPONIN I SERPL HS-MCNC: 8 NG/L (ref 0–22)
TROPONIN I SERPL HS-MCNC: 8 NG/L (ref 0–22)
WBC OTHER # BLD: 10.3 K/UL (ref 3.5–11.3)

## 2023-07-21 PROCEDURE — 6370000000 HC RX 637 (ALT 250 FOR IP): Performed by: STUDENT IN AN ORGANIZED HEALTH CARE EDUCATION/TRAINING PROGRAM

## 2023-07-21 PROCEDURE — 86901 BLOOD TYPING SEROLOGIC RH(D): CPT

## 2023-07-21 PROCEDURE — APPSS60 APP SPLIT SHARED TIME 46-60 MINUTES: Performed by: NURSE PRACTITIONER

## 2023-07-21 PROCEDURE — 6360000002 HC RX W HCPCS: Performed by: NURSE PRACTITIONER

## 2023-07-21 PROCEDURE — 2580000003 HC RX 258: Performed by: STUDENT IN AN ORGANIZED HEALTH CARE EDUCATION/TRAINING PROGRAM

## 2023-07-21 PROCEDURE — 96374 THER/PROPH/DIAG INJ IV PUSH: CPT

## 2023-07-21 PROCEDURE — 2580000003 HC RX 258: Performed by: NURSE PRACTITIONER

## 2023-07-21 PROCEDURE — C9113 INJ PANTOPRAZOLE SODIUM, VIA: HCPCS | Performed by: NURSE PRACTITIONER

## 2023-07-21 PROCEDURE — 99223 1ST HOSP IP/OBS HIGH 75: CPT | Performed by: SURGERY

## 2023-07-21 PROCEDURE — 6360000004 HC RX CONTRAST MEDICATION: Performed by: INTERNAL MEDICINE

## 2023-07-21 PROCEDURE — 83735 ASSAY OF MAGNESIUM: CPT

## 2023-07-21 PROCEDURE — 93005 ELECTROCARDIOGRAM TRACING: CPT | Performed by: STUDENT IN AN ORGANIZED HEALTH CARE EDUCATION/TRAINING PROGRAM

## 2023-07-21 PROCEDURE — 71045 X-RAY EXAM CHEST 1 VIEW: CPT

## 2023-07-21 PROCEDURE — 85027 COMPLETE CBC AUTOMATED: CPT

## 2023-07-21 PROCEDURE — 96375 TX/PRO/DX INJ NEW DRUG ADDON: CPT

## 2023-07-21 PROCEDURE — 74178 CT ABD&PLV WO CNTR FLWD CNTR: CPT

## 2023-07-21 PROCEDURE — 86140 C-REACTIVE PROTEIN: CPT

## 2023-07-21 PROCEDURE — A4216 STERILE WATER/SALINE, 10 ML: HCPCS | Performed by: NURSE PRACTITIONER

## 2023-07-21 PROCEDURE — 85379 FIBRIN DEGRADATION QUANT: CPT

## 2023-07-21 PROCEDURE — 85652 RBC SED RATE AUTOMATED: CPT

## 2023-07-21 PROCEDURE — 87040 BLOOD CULTURE FOR BACTERIA: CPT

## 2023-07-21 PROCEDURE — 84484 ASSAY OF TROPONIN QUANT: CPT

## 2023-07-21 PROCEDURE — 86900 BLOOD TYPING SEROLOGIC ABO: CPT

## 2023-07-21 PROCEDURE — 6360000002 HC RX W HCPCS: Performed by: STUDENT IN AN ORGANIZED HEALTH CARE EDUCATION/TRAINING PROGRAM

## 2023-07-21 PROCEDURE — 71260 CT THORAX DX C+: CPT

## 2023-07-21 PROCEDURE — 83880 ASSAY OF NATRIURETIC PEPTIDE: CPT

## 2023-07-21 PROCEDURE — 6370000000 HC RX 637 (ALT 250 FOR IP): Performed by: NURSE PRACTITIONER

## 2023-07-21 PROCEDURE — C9113 INJ PANTOPRAZOLE SODIUM, VIA: HCPCS | Performed by: STUDENT IN AN ORGANIZED HEALTH CARE EDUCATION/TRAINING PROGRAM

## 2023-07-21 PROCEDURE — 86850 RBC ANTIBODY SCREEN: CPT

## 2023-07-21 PROCEDURE — 99223 1ST HOSP IP/OBS HIGH 75: CPT | Performed by: STUDENT IN AN ORGANIZED HEALTH CARE EDUCATION/TRAINING PROGRAM

## 2023-07-21 PROCEDURE — 80307 DRUG TEST PRSMV CHEM ANLYZR: CPT

## 2023-07-21 PROCEDURE — 99285 EMERGENCY DEPT VISIT HI MDM: CPT

## 2023-07-21 PROCEDURE — 84100 ASSAY OF PHOSPHORUS: CPT

## 2023-07-21 PROCEDURE — 1200000000 HC SEMI PRIVATE

## 2023-07-21 PROCEDURE — 84145 PROCALCITONIN (PCT): CPT

## 2023-07-21 PROCEDURE — 83690 ASSAY OF LIPASE: CPT

## 2023-07-21 PROCEDURE — A4216 STERILE WATER/SALINE, 10 ML: HCPCS | Performed by: STUDENT IN AN ORGANIZED HEALTH CARE EDUCATION/TRAINING PROGRAM

## 2023-07-21 PROCEDURE — 80053 COMPREHEN METABOLIC PANEL: CPT

## 2023-07-21 PROCEDURE — 3209999900 POC US FAST ABDOMEN LIMITED

## 2023-07-21 PROCEDURE — 36415 COLL VENOUS BLD VENIPUNCTURE: CPT

## 2023-07-21 RX ORDER — ONDANSETRON 2 MG/ML
4 INJECTION INTRAMUSCULAR; INTRAVENOUS ONCE
Status: COMPLETED | OUTPATIENT
Start: 2023-07-21 | End: 2023-07-21

## 2023-07-21 RX ORDER — CALCIUM CARBONATE 500 MG/1
500 TABLET, CHEWABLE ORAL ONCE
Status: COMPLETED | OUTPATIENT
Start: 2023-07-21 | End: 2023-07-21

## 2023-07-21 RX ORDER — ONDANSETRON 4 MG/1
4 TABLET, ORALLY DISINTEGRATING ORAL EVERY 8 HOURS PRN
Status: DISCONTINUED | OUTPATIENT
Start: 2023-07-21 | End: 2023-07-28 | Stop reason: HOSPADM

## 2023-07-21 RX ORDER — SODIUM CHLORIDE, SODIUM LACTATE, POTASSIUM CHLORIDE, CALCIUM CHLORIDE 600; 310; 30; 20 MG/100ML; MG/100ML; MG/100ML; MG/100ML
INJECTION, SOLUTION INTRAVENOUS CONTINUOUS
Status: DISCONTINUED | OUTPATIENT
Start: 2023-07-21 | End: 2023-07-26

## 2023-07-21 RX ORDER — SODIUM CHLORIDE 9 MG/ML
INJECTION, SOLUTION INTRAVENOUS PRN
Status: DISCONTINUED | OUTPATIENT
Start: 2023-07-21 | End: 2023-07-28 | Stop reason: HOSPADM

## 2023-07-21 RX ORDER — ENOXAPARIN SODIUM 100 MG/ML
40 INJECTION SUBCUTANEOUS DAILY
Status: DISCONTINUED | OUTPATIENT
Start: 2023-07-21 | End: 2023-07-22

## 2023-07-21 RX ORDER — FOLIC ACID 1 MG/1
1 TABLET ORAL DAILY
Status: DISCONTINUED | OUTPATIENT
Start: 2023-07-21 | End: 2023-07-28 | Stop reason: HOSPADM

## 2023-07-21 RX ORDER — SODIUM CHLORIDE, SODIUM LACTATE, POTASSIUM CHLORIDE, CALCIUM CHLORIDE 600; 310; 30; 20 MG/100ML; MG/100ML; MG/100ML; MG/100ML
INJECTION, SOLUTION INTRAVENOUS CONTINUOUS
Status: ACTIVE | OUTPATIENT
Start: 2023-07-21 | End: 2023-07-21

## 2023-07-21 RX ORDER — ACETAMINOPHEN 500 MG
1000 TABLET ORAL ONCE
Status: COMPLETED | OUTPATIENT
Start: 2023-07-21 | End: 2023-07-21

## 2023-07-21 RX ORDER — SODIUM CHLORIDE 0.9 % (FLUSH) 0.9 %
5-40 SYRINGE (ML) INJECTION PRN
Status: DISCONTINUED | OUTPATIENT
Start: 2023-07-21 | End: 2023-07-28 | Stop reason: HOSPADM

## 2023-07-21 RX ORDER — POTASSIUM CHLORIDE 7.45 MG/ML
10 INJECTION INTRAVENOUS PRN
Status: DISCONTINUED | OUTPATIENT
Start: 2023-07-21 | End: 2023-07-28 | Stop reason: HOSPADM

## 2023-07-21 RX ORDER — 0.9 % SODIUM CHLORIDE 0.9 %
1000 INTRAVENOUS SOLUTION INTRAVENOUS ONCE
Status: COMPLETED | OUTPATIENT
Start: 2023-07-21 | End: 2023-07-21

## 2023-07-21 RX ORDER — ONDANSETRON 2 MG/ML
4 INJECTION INTRAMUSCULAR; INTRAVENOUS EVERY 6 HOURS PRN
Status: DISCONTINUED | OUTPATIENT
Start: 2023-07-21 | End: 2023-07-28 | Stop reason: HOSPADM

## 2023-07-21 RX ORDER — MAGNESIUM SULFATE 1 G/100ML
1000 INJECTION INTRAVENOUS PRN
Status: DISCONTINUED | OUTPATIENT
Start: 2023-07-21 | End: 2023-07-28 | Stop reason: HOSPADM

## 2023-07-21 RX ORDER — SODIUM CHLORIDE 0.9 % (FLUSH) 0.9 %
5-40 SYRINGE (ML) INJECTION EVERY 12 HOURS SCHEDULED
Status: DISCONTINUED | OUTPATIENT
Start: 2023-07-21 | End: 2023-07-28 | Stop reason: HOSPADM

## 2023-07-21 RX ORDER — MORPHINE SULFATE 4 MG/ML
4 INJECTION INTRAVENOUS ONCE
Status: COMPLETED | OUTPATIENT
Start: 2023-07-21 | End: 2023-07-21

## 2023-07-21 RX ORDER — LANOLIN ALCOHOL/MO/W.PET/CERES
100 CREAM (GRAM) TOPICAL DAILY
Status: DISCONTINUED | OUTPATIENT
Start: 2023-07-21 | End: 2023-07-28 | Stop reason: HOSPADM

## 2023-07-21 RX ADMIN — SODIUM CHLORIDE, PRESERVATIVE FREE 40 MG: 5 INJECTION INTRAVENOUS at 20:45

## 2023-07-21 RX ADMIN — SODIUM CHLORIDE 1000 ML: 9 INJECTION, SOLUTION INTRAVENOUS at 03:58

## 2023-07-21 RX ADMIN — SODIUM CHLORIDE 40 MG: 9 INJECTION INTRAMUSCULAR; INTRAVENOUS; SUBCUTANEOUS at 07:48

## 2023-07-21 RX ADMIN — ANTACID TABLETS 500 MG: 500 TABLET, CHEWABLE ORAL at 03:16

## 2023-07-21 RX ADMIN — SODIUM CHLORIDE, POTASSIUM CHLORIDE, SODIUM LACTATE AND CALCIUM CHLORIDE: 600; 310; 30; 20 INJECTION, SOLUTION INTRAVENOUS at 19:04

## 2023-07-21 RX ADMIN — SODIUM CHLORIDE, POTASSIUM CHLORIDE, SODIUM LACTATE AND CALCIUM CHLORIDE: 600; 310; 30; 20 INJECTION, SOLUTION INTRAVENOUS at 09:09

## 2023-07-21 RX ADMIN — SODIUM CHLORIDE, PRESERVATIVE FREE 10 ML: 5 INJECTION INTRAVENOUS at 09:55

## 2023-07-21 RX ADMIN — HYDROMORPHONE HYDROCHLORIDE 0.5 MG: 1 INJECTION, SOLUTION INTRAMUSCULAR; INTRAVENOUS; SUBCUTANEOUS at 13:43

## 2023-07-21 RX ADMIN — FOLIC ACID 1 MG: 1 TABLET ORAL at 19:03

## 2023-07-21 RX ADMIN — HYDROMORPHONE HYDROCHLORIDE 0.25 MG: 1 INJECTION, SOLUTION INTRAMUSCULAR; INTRAVENOUS; SUBCUTANEOUS at 05:47

## 2023-07-21 RX ADMIN — SODIUM CHLORIDE, PRESERVATIVE FREE 10 ML: 5 INJECTION INTRAVENOUS at 23:23

## 2023-07-21 RX ADMIN — IOPAMIDOL 75 ML: 755 INJECTION, SOLUTION INTRAVENOUS at 09:26

## 2023-07-21 RX ADMIN — MORPHINE SULFATE 4 MG: 4 INJECTION INTRAVENOUS at 03:56

## 2023-07-21 RX ADMIN — IOPAMIDOL 75 ML: 755 INJECTION, SOLUTION INTRAVENOUS at 14:06

## 2023-07-21 RX ADMIN — Medication 100 MG: at 19:03

## 2023-07-21 RX ADMIN — HYDROMORPHONE HYDROCHLORIDE 0.5 MG: 1 INJECTION, SOLUTION INTRAMUSCULAR; INTRAVENOUS; SUBCUTANEOUS at 09:41

## 2023-07-21 RX ADMIN — ENOXAPARIN SODIUM 40 MG: 40 INJECTION SUBCUTANEOUS at 10:05

## 2023-07-21 RX ADMIN — ONDANSETRON 4 MG: 2 INJECTION INTRAMUSCULAR; INTRAVENOUS at 03:12

## 2023-07-21 RX ADMIN — HYDROMORPHONE HYDROCHLORIDE 0.5 MG: 1 INJECTION, SOLUTION INTRAMUSCULAR; INTRAVENOUS; SUBCUTANEOUS at 19:58

## 2023-07-21 RX ADMIN — ACETAMINOPHEN 1000 MG: 500 TABLET ORAL at 03:13

## 2023-07-21 RX ADMIN — HYDROMORPHONE HYDROCHLORIDE 0.5 MG: 1 INJECTION, SOLUTION INTRAMUSCULAR; INTRAVENOUS; SUBCUTANEOUS at 23:00

## 2023-07-21 ASSESSMENT — ENCOUNTER SYMPTOMS
SORE THROAT: 0
ABDOMINAL PAIN: 0
SHORTNESS OF BREATH: 1
WHEEZING: 0
DIARRHEA: 0
NAUSEA: 1
COUGH: 0
VOMITING: 1
BACK PAIN: 0

## 2023-07-21 ASSESSMENT — PAIN SCALES - GENERAL
PAINLEVEL_OUTOF10: 10
PAINLEVEL_OUTOF10: 8

## 2023-07-21 ASSESSMENT — PAIN DESCRIPTION - LOCATION: LOCATION: CHEST

## 2023-07-21 ASSESSMENT — PAIN - FUNCTIONAL ASSESSMENT: PAIN_FUNCTIONAL_ASSESSMENT: 0-10

## 2023-07-21 ASSESSMENT — HEART SCORE: ECG: 1

## 2023-07-21 NOTE — H&P
Oxycodone Screen, Ur NEGATIVE NEGATIVE    Fentanyl, Ur NEGATIVE NEGATIVE    Test Information       Assay provides medical screening only. The absence of expected drug(s) and/or metabolite(s) may indicate diluted or adulterated urine, limitations of testing or timing of collection. EKG 12 Lead    Collection Time: 07/21/23  8:22 AM   Result Value Ref Range    Ventricular Rate 64 BPM    Atrial Rate 64 BPM    P-R Interval 176 ms    QRS Duration 78 ms    Q-T Interval 432 ms    QTc Calculation (Bazett) 445 ms    P Axis 73 degrees    R Axis 74 degrees    T Axis 55 degrees   Culture, Blood 1    Collection Time: 07/21/23  9:15 AM    Specimen: Blood   Result Value Ref Range    Specimen Description . BLOOD     Special Requests LT FOREARM 10ML     Culture NO GROWTH <24 HRS    D-Dimer, Quantitative    Collection Time: 07/21/23  9:22 AM   Result Value Ref Range    D-Dimer, Quant 0.93 (H) 0.00 - 0.57 ug/mL FEU   Sedimentation Rate    Collection Time: 07/21/23  9:22 AM   Result Value Ref Range    Sed Rate 5 0 - 20 mm/Hr   Magnesium    Collection Time: 07/21/23  9:22 AM   Result Value Ref Range    Magnesium 2.2 1.6 - 2.6 mg/dL   Phosphorus    Collection Time: 07/21/23  9:22 AM   Result Value Ref Range    Phosphorus 3.6 2.5 - 4.5 mg/dL   Brain Natriuretic Peptide    Collection Time: 07/21/23  9:22 AM   Result Value Ref Range    Pro-BNP 98 <300 pg/mL   Culture, Blood 1    Collection Time: 07/21/23  9:48 AM    Specimen: Blood   Result Value Ref Range    Specimen Description . BLOOD     Special Requests RT AC 9ML     Culture NO GROWTH <24 HRS    TYPE AND SCREEN    Collection Time: 07/21/23  9:55 AM   Result Value Ref Range    Blood Bank Sample Expiration 07/24/2023,2359     Arm Band Number [id number]     ABO/Rh B POSITIVE     Antibody Screen NEGATIVE        Imaging/Diagnostics:  XR CHEST PORTABLE    Result Date: 7/21/2023  Normal examination.        Assessment :      Hospital Problems             Last Modified POA    * (Principal) Pancreatitis, unspecified pancreatitis type 7/21/2023 Yes    GERD (gastroesophageal reflux disease) 7/21/2023 Yes    Elevated CA 19-9 level 7/21/2023 Yes    Pancreatic mass 7/21/2023 Yes    Dilation of pancreatic duct 7/21/2023 Yes    Schizophrenia ([address]) 7/21/2023 Yes       Plan:     Patient status inpatient in the [facility]    Acute pancreatitis with concern for pancreatic mass  Start IV Protonix 40 mg daily  aggressive fluid hydration with Ringer lactate as ordered  General surgery and GI consulted  Plan for pancreatic protocol CT abdomen pelvis  Keep n.p.o.  Monitor LFTs and lipase  Pain control and nausea control  Troponins within normal limits and EKG normal sinus  Chest pain less likely cardiac in nature   has been elevated- 58  Quit smoking      Consultations:   IP CONSULT TO HOSPITALIST  IP CONSULT TO GI  IP CONSULT TO GENERAL SURGERY     Patient is admitted as inpatient status because of co-morbidities listed above, severity of signs and symptoms as outlined, requirement for current medical therapies and most importantly because of direct risk to patient if care not provided in a hospital setting. Expected length of stay > 48 hours. Modesto Hines MD  7/21/2023  11:02 AM    Copy sent to Dr. Mariee primary care provider on file.

## 2023-07-21 NOTE — ED NOTES
Patient ID: Andre is a 82 year old male.    Chief Complaint   Patient presents with   • Follow-up     on HTN      1) Exertional Dyspnea - Quit smoking 30 yrs ago after 45 pk yrs.  Started > 1 yr ago.  Rescue inhaler works well.    2) HTN  3) Type 2 DM  4) CKD  5) Hypercholesterolemia  6) Hypothyroidism  7) Renal stone      Past Medical History:   Diagnosis Date   • Abnormal EKG    • Anemia    • Angiodysplasia    • Chronic kidney disease    • Diabetes mellitus (CMS/HCC)    • Essential (primary) hypertension      Past Surgical History:   Procedure Laterality Date   • Thyroidectomy       Family History   Problem Relation Age of Onset   • Diabetes Mother    • Diabetes Father      Social History     Socioeconomic History   • Marital status: /Civil Union     Spouse name: Not on file   • Number of children: Not on file   • Years of education: Not on file   • Highest education level: Not on file   Occupational History   • Not on file   Social Needs   • Financial resource strain: Not on file   • Food insecurity:     Worry: Not on file     Inability: Not on file   • Transportation needs:     Medical: Not on file     Non-medical: Not on file   Tobacco Use   • Smoking status: Former Smoker   • Smokeless tobacco: Never Used   Substance and Sexual Activity   • Alcohol use: No     Frequency: Never   • Drug use: Not on file   • Sexual activity: Not on file   Lifestyle   • Physical activity:     Days per week: Not on file     Minutes per session: Not on file   • Stress: Not on file   Relationships   • Social connections:     Talks on phone: Not on file     Gets together: Not on file     Attends [employer] service: Not on file     Active member of club or organization: Not on file     Attends meetings of clubs or organizations: Not on file     Relationship status: Not on file   • Intimate partner violence:     Fear of current or ex partner: Not on file     Emotionally abused: Not on file     Physically abused: Not on file     Dr. Rivera Chaudhari at bedside.       Karyna Chatman, RN  07/21/23 1010  Forced sexual activity: Not on file   Other Topics Concern   • Not on file   Social History Narrative   • Not on file     Current Outpatient Medications   Medication Sig Dispense Refill   • latanoprost (XALATAN) 0.005 % ophthalmic solution INT ONE GTT IN OU QHS  0   • lisinopril (ZESTRIL) 20 MG tablet Take 1 tablet by mouth every morning. 90 tablet 1   • lisinopril (ZESTRIL) 10 MG tablet Take 1 tablet by mouth nightly. 90 tablet 1   • levothyroxine (SYNTHROID, LEVOTHROID) 175 MCG tablet TAKE 1 TABLET DAILY 90 tablet 3   • insulin aspart (NOVOLOG FLEXPEN) 100 UNIT/ML pen-injector Inject 20 Units into the skin every morning. With breakfast 45 mL 1   • celecoxib (CELEBREX) 200 MG capsule Take 1 capsule by mouth daily. 14 capsule 0   • LANTUS SOLOSTAR 100 UNIT/ML pen-injector INJECT 19 UNITS AT BEDTIME 30 mL 2   • B-D U/F PEN NEEDLE 31G X 5 MM Misc USE THREE TIMES A DAY FOR INSULIN  each 3   • atorvastatin (LIPITOR) 20 MG tablet TAKE 1 TABLET AT BEDTIME 90 tablet 1   • albuterol-ipratropium (COMBIVENT RESPIMAT) 100-20 MCG/ACT inhaler Inhale 1 puff into the lungs four times daily. 4 g 5   • chlorthalidone (THALITONE) 25 MG tablet Take 1 tablet by mouth every morning. 90 tablet 1   • amLODIPine (NORVASC) 10 MG tablet Take 1 tablet by mouth daily. TAKE 1 TABLET BY MOUTH DAILY 90 tablet 1   • ONETOUCH DELICA LANCETS FINE Misc USE FOR TESTING BLOOD GLUCOSE THREE TIMES A DAY     • blood glucose test strip Test blood sugar 3 times daily as directed. Diagnosis: E11.29 . One Touch Verio 100 strip 11     No current facility-administered medications for this visit.      ALLERGIES:  No Known Allergies      Review of Systems   Constitutional: Negative.    Respiratory: Positive for shortness of breath (exertional).    Cardiovascular: Negative.    Gastrointestinal: Negative.    Genitourinary: Negative.    Skin: Negative for rash.   Neurological: Negative.         Vitals:    10/28/19 1004 10/28/19 1024   BP: 184/71 146/60    Pulse: 89    Resp: 18    Temp: 97.3 °F (36.3 °C)    TempSrc: Tympanic    Weight: 94.8 kg (208 lb 15.9 oz)    Height: 5' 10\" (1.778 m)        Physical Exam   Constitutional: He is oriented to person, place, and time. He appears well-developed and well-nourished. No distress.   Cardiovascular: Normal rate, regular rhythm and normal heart sounds.   No murmur heard.  Pulmonary/Chest: Effort normal and breath sounds normal. No respiratory distress. He has no wheezes. He has no rales.   Abdominal: Soft. Bowel sounds are normal. He exhibits no distension. There is no tenderness.   Neurological: He is alert and oriented to person, place, and time. No cranial nerve deficit.   Skin: No rash noted. He is not diaphoretic.       Assessment/Plan:   Problem List Items Addressed This Visit        Respiratory    Exertional dyspnea - Primary     Quit smoking 30 yrs ago after 45 pk yrs.    Nuclear stress test is negative, PFTs ordered in past. Trial of ProAir worked well, told to go for PFT's, reordered.  Addendum 10/29/2019: Hemoglobin is 6.6 which probably is contributing to his exertional dyspnea.  Being sent to ER for evaluation.         Relevant Orders    PULMONARY FUNCTION TEST       Circulatory    Benign essential hypertension     On Lisinopril 20 mg and Chlorthalidone 25 mg in am and Norvasc 10 mg in evening.  High again, increase Lisinopril to 20 mg bid, also for increasing Microalbuminuria.           Relevant Medications    lisinopril (ZESTRIL) 20 MG tablet    lisinopril (ZESTRIL) 10 MG tablet    Abdominal aortic aneurysm (AAA) without rupture (CMS/HCC)     CT negative for AAA but has 1.2 cm renal stone.  Will refer to Urology since stone > 1cm.           Relevant Medications    lisinopril (ZESTRIL) 20 MG tablet    lisinopril (ZESTRIL) 10 MG tablet       Urinary    Chronic kidney disease, stage 3 (CMS/HCC)     Last creatinine improved from 1.3-> 1.2.  Recheck today.  Patient is on ACE inhibitor, will be increased today.   Renal ultrasound ordered.  Addendum 10/29/2019: Creatinine stable at 1.2.         Relevant Orders    US KIDNEY BILATERAL    BASIC METABOLIC PANEL (Completed)    Microalbuminuria     Microalbumin is up from 500->1300.  Add Lisinopril to 10 mg at night.  Renal function stable.  Renal ultrasound ordered           Relevant Orders    US KIDNEY BILATERAL    Renal stones     CT negative for AAA but has 1.2 cm renal stone.  Will refer to Urology next visit since stone > 1cm.           Relevant Orders    SERVICE TO UROLOGY       Endocrine    Pure hypercholesterolemia with target low density lipoprotein (LDL) cholesterol less than 70 mg/dL     On Lipitor 20 mg, LFT's today.   LDL 83, close to goal.  Recheck Lipids, LFT's due in 3 months.  Addendum 10/29/2019: Lipids normal with an LDL of 45.         Relevant Medications    lisinopril (ZESTRIL) 20 MG tablet    lisinopril (ZESTRIL) 10 MG tablet    Other Relevant Orders    LIPID PANEL WITHOUT REFLEX (Completed)    Acquired hypothyroidism     On Levothyroxine 175 mcg.  Recheck today.  Addendum 10/20/2019: TSH stable at 4.8.  Recheck 6 months.         Relevant Orders    THYROID STIMULATING HORMONE (Completed)    DM type 2, uncontrolled, with renal complications (CMS/[facility])     A1c goal < 8.0.  Last A1c 7.0, eating improved.  Taking Lantus 20 units qhs and Novolog 14 units with breakfast only.  Last A1c is up slightly from 7.0->8.0. recheck today.    Microalbumin ordered.   Optho - seen 1/19 (Mary).   Historically has been a poor eater.  Microalbumin is up to 1300, increase Ace. Inh.  Addendum 10/29/2019: A1c is down from 8.0->7.6 which is at his goal since he is 82 years old..  Discussed with patient.  Recheck 3 months.         Relevant Orders    GLYCOHEMOGLOBIN (Completed)       Hematologic & Lymphatic    Anemia     Borderline in past, recheck today.  ADDENDUM 10/29/2019: Hemoglobin 6.6 with an MCV of 56 and a ferritin of 10.  Normal B12 and folate.  Patient does feel tired and  has been having exertional dyspnea.  Discussed with patient this morning and was directed to his local ER.  Patient states he will go immediately.  Needs GI work-up.  Not on aspirin or anticoagulation.         Relevant Orders    CBC WITH DIFFERENTIAL (Completed)    FERRITIN (Completed)    FOLATE (Completed)    VITAMIN B12 (Completed)    IRON AND TOTAL IRON BINDING CAPACITY (Completed)       Other    Abnormal electrocardiogram     Normal nuclear stress test         Physical exam     Colonoscopy- was done around 2015 per patient.  Patient has received Pneumovax and Prevnar 13 in the past.  Flu vaccine received today.    Tdap vaccine-with injury will give.  Shingles vaccine-local pharmacy since we are out.  AAA screen is negative.  Former smoker and low-dose CT scan is not indicated since quit greater than 15 years ago.         Insulin long-term use (CMS/HCC)      Other Visit Diagnoses     Need for vaccination        Relevant Orders    INFLUENZA ENHANCED HIGH DOSE SPLIT PRES FREE VACC, IM (FLUZONE-HIGH DOSE) (Completed)                  Rickey Douglas DO

## 2023-07-21 NOTE — ED PROVIDER NOTES
[address] [place] [address] ED  Emergency Department Encounter  Emergency Medicine Resident     Pt Reyna Alonzo  MRN: [id number]  [facility] 1965  Date of evaluation: 7/21/23  PCP:  No primary care provider on file. Note Started: 1:54 AM EDT      CHIEF COMPLAINT       Chief Complaint   Patient presents with    Chest Pain       HISTORY OF PRESENT ILLNESS  (Location/Symptom, Timing/Onset, Context/Setting, Quality, Duration, Modifying Factors, Severity.)      Tessa Hammond is a 66 y.o. male who presents with chest pain. States he awoke from sleep shortly prior to arrival stating that he felt hot. He states he started having nausea and felt like he was going to vomit so he went downstairs to the bathroom. Patient states he started having pain in the chest as well as some mild shortness of breath. Denies any recent illness. He denies any fevers or chills. Patient has a history of pancreatitis as well as a pancreatic head mass. He was admitted about a month ago for the same. He denies any other complaints at this time. PAST MEDICAL / SURGICAL / SOCIAL / FAMILY HISTORY      has a past medical history of Abdominal pain, COVID-19 vaccine series completed, Duodenitis, Elevated CEA, Fatty liver, GERD (gastroesophageal reflux disease), History of recent hospitalization, Retroperitoneal mass, and Weight loss. has a past surgical history that includes Hand surgery and Urethra surgery.       Social History     Socioeconomic History    Marital status: Single     Spouse name: Not on file    Number of children: Not on file    Years of education: Not on file    Highest education level: Not on file   Occupational History    Not on file   Tobacco Use    Smoking status: Former     Packs/day: 1.00     Years: 25.00     Pack years: 25.00     Types: Cigarettes    Smokeless tobacco: Never   Substance and Sexual Activity    Alcohol use: No     Alcohol/week: 0.0 standard drinks    Drug use: No    Sexual activity:

## 2023-07-21 NOTE — PLAN OF CARE
Patient desaturated to 88-89 and complaining of chest pain. Elevated D-dimer which can be secondary to possible malignancy given pancreatic mass however patient had episode of hypoxia and complains of intermittent chest pain and shortness of breath.   Will obtain CT PE scan and recheck troponin

## 2023-07-21 NOTE — ED NOTES
Patient c/o newly onset chest pain, EKG done and looked at by ED physician, Dr. Carol Beverly notified, EKG transmitted.       Jeannie Aguilar RN  07/21/23 0145

## 2023-07-21 NOTE — PLAN OF CARE
Pt is to have EUS/ERCP with Dr. Juan Pichardo permitting in the OR.    Please hold Lovenox  Apply EPC    NPO MN    Inpatient GI will sign off  Bethel Ghotra CNP

## 2023-07-21 NOTE — ED NOTES
Report given to Fernanda Moya, all questions asked and answered.       Alberto Sheriff RN  07/21/23 1930

## 2023-07-21 NOTE — ED NOTES
Patient c/o chest pain, states it is intermittent and feels the same as it did earlier, mid-sternal, repeat EKG obtained, Dr. Chaim Owens notified.       Stephie Kirk RN  07/21/23 2797

## 2023-07-21 NOTE — ED NOTES
Pt arrived to ED through EMS with c/o of chest pain. Pt stated the chest pain woke him up out of his sleep. Pt stated he went outside to get air when he started to feel dizzy and nauseous. Pt denies any medical hx. Pt hooked up to monitor, bp, pulse on. EKG completed and charted. IV access started with labs drawn.  Pt appears to be in no acute distress at this time     Maday Alanis RN  07/21/23 6827

## 2023-07-21 NOTE — ED NOTES
Dr. Susan Restrepo notified that patient had O2 desat to 89% when resting. Patient was placed on 2L nasal cannula, O2 sat WNL after O2 admin.       Segundo Mejia RN  07/21/23 6394

## 2023-07-22 ENCOUNTER — APPOINTMENT (OUTPATIENT)
Dept: VASCULAR LAB | Age: 58
End: 2023-07-22
Attending: SURGERY
Payer: MEDICARE

## 2023-07-22 LAB
ALBUMIN SERPL-MCNC: 3.5 G/DL (ref 3.5–5.2)
ALBUMIN/GLOB SERPL: 1.4 {RATIO} (ref 1–2.5)
ALP SERPL-CCNC: 266 U/L (ref 40–129)
ALT SERPL-CCNC: 90 U/L (ref 5–41)
ANION GAP SERPL CALCULATED.3IONS-SCNC: 7 MMOL/L (ref 9–17)
AST SERPL-CCNC: 52 U/L
BASOPHILS # BLD: 0.04 K/UL (ref 0–0.2)
BASOPHILS NFR BLD: 1 % (ref 0–2)
BILIRUB SERPL-MCNC: 1.2 MG/DL (ref 0.3–1.2)
BUN SERPL-MCNC: 9 MG/DL (ref 6–20)
CA-I BLD-SCNC: 1.12 MMOL/L (ref 1.13–1.33)
CALCIUM SERPL-MCNC: 9.4 MG/DL (ref 8.6–10.4)
CANCER AG19-9 SERPL IA-ACNC: 70 U/ML (ref 0–35)
CHLORIDE SERPL-SCNC: 105 MMOL/L (ref 98–107)
CO2 SERPL-SCNC: 26 MMOL/L (ref 20–31)
CREAT SERPL-MCNC: 0.7 MG/DL (ref 0.7–1.2)
EKG ATRIAL RATE: 58 BPM
EKG ATRIAL RATE: 64 BPM
EKG P AXIS: 73 DEGREES
EKG P AXIS: 75 DEGREES
EKG P-R INTERVAL: 176 MS
EKG P-R INTERVAL: 178 MS
EKG Q-T INTERVAL: 432 MS
EKG Q-T INTERVAL: 448 MS
EKG QRS DURATION: 74 MS
EKG QRS DURATION: 78 MS
EKG QTC CALCULATION (BAZETT): 439 MS
EKG QTC CALCULATION (BAZETT): 445 MS
EKG R AXIS: 74 DEGREES
EKG R AXIS: 81 DEGREES
EKG T AXIS: 55 DEGREES
EKG T AXIS: 64 DEGREES
EKG VENTRICULAR RATE: 58 BPM
EKG VENTRICULAR RATE: 64 BPM
EOSINOPHIL # BLD: 0.18 K/UL (ref 0–0.44)
EOSINOPHILS RELATIVE PERCENT: 3 % (ref 1–4)
ERYTHROCYTE [DISTWIDTH] IN BLOOD BY AUTOMATED COUNT: 14.6 % (ref 11.8–14.4)
GFR SERPL CREATININE-BSD FRML MDRD: >60 ML/MIN/1.73M2
GLUCOSE SERPL-MCNC: 75 MG/DL (ref 70–99)
HCT VFR BLD AUTO: 38.8 % (ref 40.7–50.3)
HGB BLD-MCNC: 12.8 G/DL (ref 13–17)
IMM GRANULOCYTES # BLD AUTO: <0.03 K/UL (ref 0–0.3)
IMM GRANULOCYTES NFR BLD: 0 %
INR PPP: 1
LIPASE SERPL-CCNC: 146 U/L (ref 13–60)
LYMPHOCYTES NFR BLD: 2.07 K/UL (ref 1.1–3.7)
LYMPHOCYTES RELATIVE PERCENT: 33 % (ref 24–43)
MAGNESIUM SERPL-MCNC: 2 MG/DL (ref 1.6–2.6)
MCH RBC QN AUTO: 28.6 PG (ref 25.2–33.5)
MCHC RBC AUTO-ENTMCNC: 33 G/DL (ref 28.4–34.8)
MCV RBC AUTO: 86.6 FL (ref 82.6–102.9)
METER GLUCOSE: 104 MG/DL (ref 75–110)
METER GLUCOSE: 156 MG/DL (ref 75–110)
METER GLUCOSE: 79 MG/DL (ref 75–110)
MONOCYTES NFR BLD: 0.64 K/UL (ref 0.1–1.2)
MONOCYTES NFR BLD: 10 % (ref 3–12)
NEUTROPHILS NFR BLD: 53 % (ref 36–65)
NEUTS SEG NFR BLD: 3.32 K/UL (ref 1.5–8.1)
NRBC BLD-RTO: 0 PER 100 WBC
PHOSPHATE SERPL-MCNC: 3.5 MG/DL (ref 2.5–4.5)
PLATELET # BLD AUTO: 375 K/UL (ref 138–453)
PMV BLD AUTO: 8.6 FL (ref 8.1–13.5)
POTASSIUM SERPL-SCNC: 4.5 MMOL/L (ref 3.7–5.3)
PREALB SERPL-MCNC: 17.9 MG/DL (ref 20–40)
PROT SERPL-MCNC: 6 G/DL (ref 6.4–8.3)
PROTHROMBIN TIME: 12.9 SEC (ref 11.7–14.9)
RBC # BLD AUTO: 4.48 M/UL (ref 4.21–5.77)
RBC # BLD: ABNORMAL 10*6/UL
SODIUM SERPL-SCNC: 138 MMOL/L (ref 135–144)
TRIGL SERPL-MCNC: 68 MG/DL
WBC OTHER # BLD: 6.3 K/UL (ref 3.5–11.3)

## 2023-07-22 PROCEDURE — 6360000002 HC RX W HCPCS: Performed by: NURSE PRACTITIONER

## 2023-07-22 PROCEDURE — 6370000000 HC RX 637 (ALT 250 FOR IP): Performed by: NURSE PRACTITIONER

## 2023-07-22 PROCEDURE — 80053 COMPREHEN METABOLIC PANEL: CPT

## 2023-07-22 PROCEDURE — 6370000000 HC RX 637 (ALT 250 FOR IP): Performed by: SURGERY

## 2023-07-22 PROCEDURE — 82947 ASSAY GLUCOSE BLOOD QUANT: CPT

## 2023-07-22 PROCEDURE — 86301 IMMUNOASSAY TUMOR CA 19-9: CPT

## 2023-07-22 PROCEDURE — 82330 ASSAY OF CALCIUM: CPT

## 2023-07-22 PROCEDURE — 2580000003 HC RX 258: Performed by: NURSE PRACTITIONER

## 2023-07-22 PROCEDURE — 85027 COMPLETE CBC AUTOMATED: CPT

## 2023-07-22 PROCEDURE — 93010 ELECTROCARDIOGRAM REPORT: CPT | Performed by: INTERNAL MEDICINE

## 2023-07-22 PROCEDURE — 1200000000 HC SEMI PRIVATE

## 2023-07-22 PROCEDURE — 6360000002 HC RX W HCPCS: Performed by: SURGERY

## 2023-07-22 PROCEDURE — 83735 ASSAY OF MAGNESIUM: CPT

## 2023-07-22 PROCEDURE — 84478 ASSAY OF TRIGLYCERIDES: CPT

## 2023-07-22 PROCEDURE — 84100 ASSAY OF PHOSPHORUS: CPT

## 2023-07-22 PROCEDURE — 84134 ASSAY OF PREALBUMIN: CPT

## 2023-07-22 PROCEDURE — 85610 PROTHROMBIN TIME: CPT

## 2023-07-22 PROCEDURE — C9113 INJ PANTOPRAZOLE SODIUM, VIA: HCPCS | Performed by: NURSE PRACTITIONER

## 2023-07-22 PROCEDURE — 83690 ASSAY OF LIPASE: CPT

## 2023-07-22 PROCEDURE — 6360000002 HC RX W HCPCS: Performed by: STUDENT IN AN ORGANIZED HEALTH CARE EDUCATION/TRAINING PROGRAM

## 2023-07-22 PROCEDURE — 99232 SBSQ HOSP IP/OBS MODERATE 35: CPT | Performed by: STUDENT IN AN ORGANIZED HEALTH CARE EDUCATION/TRAINING PROGRAM

## 2023-07-22 PROCEDURE — 36415 COLL VENOUS BLD VENIPUNCTURE: CPT

## 2023-07-22 PROCEDURE — 99231 SBSQ HOSP IP/OBS SF/LOW 25: CPT | Performed by: SURGERY

## 2023-07-22 PROCEDURE — 93970 EXTREMITY STUDY: CPT

## 2023-07-22 RX ORDER — ENOXAPARIN SODIUM 100 MG/ML
1 INJECTION SUBCUTANEOUS 2 TIMES DAILY
Status: DISCONTINUED | OUTPATIENT
Start: 2023-07-22 | End: 2023-07-24

## 2023-07-22 RX ORDER — ACETAMINOPHEN 500 MG
1000 TABLET ORAL EVERY 8 HOURS SCHEDULED
Status: DISCONTINUED | OUTPATIENT
Start: 2023-07-22 | End: 2023-07-28 | Stop reason: HOSPADM

## 2023-07-22 RX ORDER — TRAMADOL HYDROCHLORIDE 50 MG/1
50 TABLET ORAL EVERY 4 HOURS PRN
Status: DISCONTINUED | OUTPATIENT
Start: 2023-07-22 | End: 2023-07-24

## 2023-07-22 RX ORDER — TRAMADOL HYDROCHLORIDE 50 MG/1
50 TABLET ORAL EVERY 4 HOURS PRN
Status: DISCONTINUED | OUTPATIENT
Start: 2023-07-22 | End: 2023-07-22 | Stop reason: SDUPTHER

## 2023-07-22 RX ORDER — GABAPENTIN 300 MG/1
300 CAPSULE ORAL 3 TIMES DAILY
Status: DISCONTINUED | OUTPATIENT
Start: 2023-07-22 | End: 2023-07-28 | Stop reason: HOSPADM

## 2023-07-22 RX ADMIN — MYCOPHENOLATE MOFETIL 300 MG: 500 TABLET ORAL at 11:27

## 2023-07-22 RX ADMIN — HYDROMORPHONE HYDROCHLORIDE 0.5 MG: 1 INJECTION, SOLUTION INTRAMUSCULAR; INTRAVENOUS; SUBCUTANEOUS at 14:44

## 2023-07-22 RX ADMIN — SODIUM CHLORIDE, POTASSIUM CHLORIDE, SODIUM LACTATE AND CALCIUM CHLORIDE: 600; 310; 30; 20 INJECTION, SOLUTION INTRAVENOUS at 02:55

## 2023-07-22 RX ADMIN — ACETAMINOPHEN 1000 MG: 500 TABLET ORAL at 17:08

## 2023-07-22 RX ADMIN — SODIUM CHLORIDE, PRESERVATIVE FREE 10 ML: 5 INJECTION INTRAVENOUS at 10:46

## 2023-07-22 RX ADMIN — Medication 100 MG: at 10:39

## 2023-07-22 RX ADMIN — ACETAMINOPHEN 1000 MG: 500 TABLET ORAL at 10:40

## 2023-07-22 RX ADMIN — HYDROMORPHONE HYDROCHLORIDE 0.5 MG: 1 INJECTION, SOLUTION INTRAMUSCULAR; INTRAVENOUS; SUBCUTANEOUS at 02:04

## 2023-07-22 RX ADMIN — MYCOPHENOLATE MOFETIL 300 MG: 500 TABLET ORAL at 14:44

## 2023-07-22 RX ADMIN — HYDROMORPHONE HYDROCHLORIDE 0.5 MG: 1 INJECTION, SOLUTION INTRAMUSCULAR; INTRAVENOUS; SUBCUTANEOUS at 18:29

## 2023-07-22 RX ADMIN — HYDROMORPHONE HYDROCHLORIDE 0.5 MG: 1 INJECTION, SOLUTION INTRAMUSCULAR; INTRAVENOUS; SUBCUTANEOUS at 11:27

## 2023-07-22 RX ADMIN — TRAMADOL HYDROCHLORIDE 50 MG: 50 TABLET, COATED ORAL at 23:27

## 2023-07-22 RX ADMIN — SODIUM CHLORIDE, PRESERVATIVE FREE 40 MG: 5 INJECTION INTRAVENOUS at 10:40

## 2023-07-22 RX ADMIN — HYDROMORPHONE HYDROCHLORIDE 0.5 MG: 1 INJECTION, SOLUTION INTRAMUSCULAR; INTRAVENOUS; SUBCUTANEOUS at 06:28

## 2023-07-22 RX ADMIN — ENOXAPARIN SODIUM 80 MG: 100 INJECTION SUBCUTANEOUS at 10:40

## 2023-07-22 RX ADMIN — MYCOPHENOLATE MOFETIL 300 MG: 500 TABLET ORAL at 21:13

## 2023-07-22 RX ADMIN — FOLIC ACID 1 MG: 1 TABLET ORAL at 10:39

## 2023-07-22 RX ADMIN — TRAMADOL HYDROCHLORIDE 50 MG: 50 TABLET, COATED ORAL at 17:08

## 2023-07-22 RX ADMIN — SODIUM CHLORIDE, PRESERVATIVE FREE 10 ML: 5 INJECTION INTRAVENOUS at 21:08

## 2023-07-22 RX ADMIN — TRAMADOL HYDROCHLORIDE 50 MG: 50 TABLET, COATED ORAL at 10:40

## 2023-07-22 RX ADMIN — HYDROMORPHONE HYDROCHLORIDE 0.5 MG: 1 INJECTION, SOLUTION INTRAMUSCULAR; INTRAVENOUS; SUBCUTANEOUS at 21:29

## 2023-07-22 RX ADMIN — SODIUM CHLORIDE, PRESERVATIVE FREE 40 MG: 5 INJECTION INTRAVENOUS at 21:09

## 2023-07-22 RX ADMIN — ENOXAPARIN SODIUM 80 MG: 100 INJECTION SUBCUTANEOUS at 21:13

## 2023-07-22 ASSESSMENT — PAIN DESCRIPTION - DESCRIPTORS
DESCRIPTORS: ACHING
DESCRIPTORS: ACHING;SORE
DESCRIPTORS: ACHING

## 2023-07-22 ASSESSMENT — PAIN DESCRIPTION - LOCATION
LOCATION: ABDOMEN
LOCATION: BACK;ABDOMEN;CHEST
LOCATION: ABDOMEN

## 2023-07-22 ASSESSMENT — PAIN SCALES - GENERAL
PAINLEVEL_OUTOF10: 8
PAINLEVEL_OUTOF10: 9
PAINLEVEL_OUTOF10: 7
PAINLEVEL_OUTOF10: 8
PAINLEVEL_OUTOF10: 7
PAINLEVEL_OUTOF10: 8

## 2023-07-22 ASSESSMENT — PAIN DESCRIPTION - ORIENTATION
ORIENTATION: RIGHT;LEFT;MID;LOWER
ORIENTATION: RIGHT;LEFT;LOWER;MID
ORIENTATION: MID

## 2023-07-22 ASSESSMENT — PAIN DESCRIPTION - PAIN TYPE: TYPE: ACUTE PAIN

## 2023-07-22 ASSESSMENT — PAIN - FUNCTIONAL ASSESSMENT
PAIN_FUNCTIONAL_ASSESSMENT: ACTIVITIES ARE NOT PREVENTED

## 2023-07-22 NOTE — PROGRESS NOTES
Comprehensive Nutrition Assessment    Type and Reason for Visit:  Consult    Nutrition Recommendations/Plan:   Continue current diet. Change ONS to high protein/high calorie per patient preference  Monitor weight, labs and intake. Malnutrition Assessment:  Malnutrition Status: Moderate malnutrition (07/22/23 1226)    Context:  Chronic Illness     Findings of the 6 clinical characteristics of malnutrition:  Energy Intake:  No significant decrease in energy intake  Weight Loss:  Mild weight loss (specify amount and time period) (10.5% x7-8 months)     Body Fat Loss:   (moderate) Orbital   Muscle Mass Loss:   (moderate) Temples (temporalis), Clavicles (pectoralis & deltoids)  Fluid Accumulation:  No significant fluid accumulation     Strength:  Not Performed    Nutrition Assessment:    Consult for unintentional weight loss. Admitted for pancreatitis. [facility] schizophrenia. Patient reports 20 lb weight loss over the last 7-8 months (10.5%). He consumed 100% of breakfast. States he had no change in appetite/intake prior to admission. He reports he prefers regular ensure to clear ensure, order changed. Labs/meds reviewed    Nutrition Related Findings:    Labs/meds reviewed Wound Type: None       Current Nutrition Intake & Therapies:    Average Meal Intake: %  Average Supplements Intake: %  Diet NPO Exceptions are: Sips of Water with Meds  ADULT DIET; Regular; Low Fat/Low Chol/High Fiber/MADDISON; No Caffeine, No red dye, No Carbonated Beverages  ADULT ORAL NUTRITION SUPPLEMENT; Breakfast, Lunch, Dinner; Standard High Calorie/High Protein Oral Supplement    Anthropometric Measures:  Height: 5' 9\" (175.3 cm)  Ideal Body Weight (IBW): 160 lbs (73 kg)       Current Body Weight: 169 lb 15.6 oz (77.1 kg), 106.2 % IBW. Weight Source: Stated  Current BMI (kg/m2): 25.1                          BMI Categories: Overweight (BMI 25.0-29. 9)    Estimated Daily Nutrient Needs:  Energy Requirements Based On: Kcal/kg  Weight

## 2023-07-22 NOTE — CARE COORDINATION
07/22/23 1149   Readmission Assessment   Number of Days since last admission? 8-30 days   Previous Disposition Home Alone   Who is being Interviewed Patient   What was the patient's/caregiver's perception as to why they think they needed to return back to the hospital? Other (Comment); AMA discharge on prior admission  (pain)   Did you visit your Primary Care Physician after you left the hospital, before you returned this time? No   Why weren't you able to visit your PCP? Did not have an appointment   Did you see a specialist, such as Cardiac, Pulmonary, Orthopedic Physician, etc. after you left the hospital? No   Who advised the patient to return to the hospital? Self-referral   Does the patient report anything that got in the way of taking their medications? No   In our efforts to provide the best possible care to you and others like you, can you think of anything that we could have done to help you after you left the hospital the first time, so that you might not have needed to return so soon?  Other (Comment)  (\"I should have stayed\")

## 2023-07-22 NOTE — CARE COORDINATION
Case Management Assessment  Initial Evaluation    Date/Time of Evaluation: 7/22/2023 11:48 AM  Assessment Completed by: Adilene Adams RN    If patient is discharged prior to next notation, then this note serves as note for discharge by case management. Patient Name: Cisco Gomez                   YOB: 1965  Diagnosis: Pancreatitis, unspecified pancreatitis type [K85.90]  Acute pancreatitis, unspecified complication status, unspecified pancreatitis type [K85.90]                   Date / Time: 7/21/2023  1:53 AM    Patient Admission Status: Inpatient   Readmission Risk (Low < 19, Mod (19-27), High > 27): Readmission Risk Score: 14.9    Current PCP: No primary care provider on file. PCP verified by CM? (P) No (no PCP - Clinic list is given)    Chart Reviewed: Yes      History Provided by: (P) Patient  Patient Orientation: (P) Alert and Oriented    Patient Cognition: (P) Alert    Hospitalization in the last 30 days (Readmission):  Yes    If yes, Readmission Assessment in CM Navigator will be completed.     Advance Directives:      Code Status: Full Code   Patient's Primary Decision Maker is:        Discharge Planning:    Patient lives with: (P) Alone Type of Home: (P) House  Primary Care Giver: (P) Self  Patient Support Systems include: (P) None   Current Financial resources: (P) Medicaid, Medicare  Current community resources:    Current services prior to admission: (P) None            Current DME:              Type of Home Care services:  (P) None    ADLS  Prior functional level: (P) Independent in ADLs/IADLs  Current functional level: (P) Independent in ADLs/IADLs    PT AM-PAC:   /24  OT AM-PAC:   /24    Family can provide assistance at DC: (P) Yes  Would you like Case Management to discuss the discharge plan with any other family members/significant others, and if so, who? (P) No  Plans to Return to Present Housing: (P) Yes  Other Identified Issues/Barriers to RETURNING to current housing: none identified   Potential Assistance needed at discharge: (P) N/A            Potential DME:    Patient expects to discharge to: [id number] [place] [address] for transportation at discharge:      Financial    Payor: Homar Willis / Plan: MEDICARE PART A AND B / Product Type: *No Product type* /     Does insurance require precert for SNF: Yes    Potential assistance Purchasing Medications: (P) No  Meds-to-Beds request: Yes      [id number] - [address], [address]  [facility] [address]  Phone: [phone] Fax: [phone]      Notes:    Factors facilitating achievement of predicted outcomes: Cooperative    Barriers to discharge: Pain    Additional Case Management Notes: Patient plans to return home independently     The Plan for Transition of Care is related to the following treatment goals of Pancreatitis, unspecified pancreatitis type [K85.90]  Acute pancreatitis, unspecified complication status, unspecified pancreatitis type [U43.76]    IF APPLICABLE: The Patient and/or patient representative Teamer and his family were provided with a choice of provider and agrees with the discharge plan. Freedom of choice list with basic dialogue that supports the patient's individualized plan of care/goals and shares the quality data associated with the providers was provided to:     Patient Representative Name:       The Patient and/or Patient Representative Agree with the Discharge Plan?       Rupal Cuadra RN  Case Management Department  Ph: [phone]

## 2023-07-22 NOTE — PLAN OF CARE
Problem: Discharge Planning  Goal: Discharge to home or other facility with appropriate resources  7/22/2023 1805 by Pratima Rodriguez RN  Outcome: Progressing  7/22/2023 0630 by Boogie Hardwick RN  Outcome: Progressing     Problem: Pain  Goal: Verbalizes/displays adequate comfort level or baseline comfort level  7/22/2023 1805 by Pratima Rodriguez RN  Outcome: Progressing  7/22/2023 0630 by Boogie Hardwick RN  Outcome: Progressing     Problem: Safety - Adult  Goal: Free from fall injury  7/22/2023 1805 by Pratima Rodriguez RN  Outcome: Progressing  7/22/2023 0630 by Boogie Hardwick RN  Outcome: Progressing

## 2023-07-22 NOTE — PROGRESS NOTES
I saw and evaluated the patient, performing the key elements of the service. I discussed the findings, assessment and plan with the nurse practitioner/resident and agree with the their findings and plan as documented in the their note. 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 Nelly Koo  No IV fluid, advance to low-fat diet  Multimodality pain control  Daily labs including inflammatory markers  No indication for antibiotics  Continue PPI  Concern for subsegmental PE on CT scan, ordered DVT scan lower extremity    Time spent caring for patient 25 minutes    I spent greater than 50% of the visit coordinating care and answering all questions from patient, and family members, reviewing the possible outcome of the treatment. Reji Etienne MD [facility]  Surgical Oncology/HPB Surgery  [facility]  [address]  [facility] [address]  Office:  [phone]  Fax:  [phone]  7/22/2023  8:24 AM

## 2023-07-23 ENCOUNTER — APPOINTMENT (OUTPATIENT)
Dept: CT IMAGING | Age: 58
End: 2023-07-23
Payer: MEDICARE

## 2023-07-23 PROBLEM — J98.4 PNEUMONITIS: Status: ACTIVE | Noted: 2023-07-23

## 2023-07-23 PROBLEM — J18.9 PNEUMONITIS: Status: ACTIVE | Noted: 2023-07-23

## 2023-07-23 LAB
ALBUMIN SERPL-MCNC: 3.6 G/DL (ref 3.5–5.2)
ALBUMIN/GLOB SERPL: 1.3 {RATIO} (ref 1–2.5)
ALP SERPL-CCNC: 348 U/L (ref 40–129)
ALT SERPL-CCNC: 112 U/L (ref 5–41)
ANION GAP SERPL CALCULATED.3IONS-SCNC: 10 MMOL/L (ref 9–17)
AST SERPL-CCNC: 84 U/L
BASOPHILS # BLD: 0.03 K/UL (ref 0–0.2)
BASOPHILS NFR BLD: 0 % (ref 0–2)
BILIRUB SERPL-MCNC: 2.8 MG/DL (ref 0.3–1.2)
BNP SERPL-MCNC: 438 PG/ML
BUN SERPL-MCNC: 10 MG/DL (ref 6–20)
CALCIUM SERPL-MCNC: 9 MG/DL (ref 8.6–10.4)
CHLORIDE SERPL-SCNC: 106 MMOL/L (ref 98–107)
CO2 SERPL-SCNC: 22 MMOL/L (ref 20–31)
CREAT SERPL-MCNC: 0.6 MG/DL (ref 0.7–1.2)
CRP SERPL HS-MCNC: 3.3 MG/L (ref 0–5)
EOSINOPHIL # BLD: 0.12 K/UL (ref 0–0.44)
EOSINOPHILS RELATIVE PERCENT: 2 % (ref 1–4)
ERYTHROCYTE [DISTWIDTH] IN BLOOD BY AUTOMATED COUNT: 14.4 % (ref 11.8–14.4)
GFR SERPL CREATININE-BSD FRML MDRD: >60 ML/MIN/1.73M2
GLUCOSE SERPL-MCNC: 90 MG/DL (ref 70–99)
HCT VFR BLD AUTO: 39.6 % (ref 40.7–50.3)
HGB BLD-MCNC: 13.2 G/DL (ref 13–17)
IMM GRANULOCYTES # BLD AUTO: <0.03 K/UL (ref 0–0.3)
IMM GRANULOCYTES NFR BLD: 0 %
INR PPP: 1
LYMPHOCYTES NFR BLD: 1.75 K/UL (ref 1.1–3.7)
LYMPHOCYTES RELATIVE PERCENT: 21 % (ref 24–43)
MAGNESIUM SERPL-MCNC: 2 MG/DL (ref 1.6–2.6)
MCH RBC QN AUTO: 28.4 PG (ref 25.2–33.5)
MCHC RBC AUTO-ENTMCNC: 33.3 G/DL (ref 28.4–34.8)
MCV RBC AUTO: 85.3 FL (ref 82.6–102.9)
MONOCYTES NFR BLD: 0.39 K/UL (ref 0.1–1.2)
MONOCYTES NFR BLD: 5 % (ref 3–12)
NEUTROPHILS NFR BLD: 72 % (ref 36–65)
NEUTS SEG NFR BLD: 5.87 K/UL (ref 1.5–8.1)
NRBC BLD-RTO: 0 PER 100 WBC
PHOSPHATE SERPL-MCNC: 3.4 MG/DL (ref 2.5–4.5)
PLATELET # BLD AUTO: 360 K/UL (ref 138–453)
PMV BLD AUTO: 9.4 FL (ref 8.1–13.5)
POTASSIUM SERPL-SCNC: 4.3 MMOL/L (ref 3.7–5.3)
PROCALCITONIN SERPL-MCNC: 0.09 NG/ML
PROT SERPL-MCNC: 6.4 G/DL (ref 6.4–8.3)
PROTHROMBIN TIME: 12.8 SEC (ref 11.7–14.9)
RBC # BLD AUTO: 4.64 M/UL (ref 4.21–5.77)
REASON FOR REJECTION: NORMAL
SODIUM SERPL-SCNC: 138 MMOL/L (ref 135–144)
SPECIMEN SOURCE: NORMAL
WBC OTHER # BLD: 8.2 K/UL (ref 3.5–11.3)
ZZ NTE CLEAN UP: ORDERED TEST: NORMAL

## 2023-07-23 PROCEDURE — 6360000002 HC RX W HCPCS: Performed by: SURGERY

## 2023-07-23 PROCEDURE — 6370000000 HC RX 637 (ALT 250 FOR IP): Performed by: SURGERY

## 2023-07-23 PROCEDURE — 83735 ASSAY OF MAGNESIUM: CPT

## 2023-07-23 PROCEDURE — 2580000003 HC RX 258: Performed by: NURSE PRACTITIONER

## 2023-07-23 PROCEDURE — 71260 CT THORAX DX C+: CPT

## 2023-07-23 PROCEDURE — 6370000000 HC RX 637 (ALT 250 FOR IP): Performed by: NURSE PRACTITIONER

## 2023-07-23 PROCEDURE — 99223 1ST HOSP IP/OBS HIGH 75: CPT | Performed by: INTERNAL MEDICINE

## 2023-07-23 PROCEDURE — 84145 PROCALCITONIN (PCT): CPT

## 2023-07-23 PROCEDURE — C9113 INJ PANTOPRAZOLE SODIUM, VIA: HCPCS | Performed by: NURSE PRACTITIONER

## 2023-07-23 PROCEDURE — 6360000002 HC RX W HCPCS: Performed by: STUDENT IN AN ORGANIZED HEALTH CARE EDUCATION/TRAINING PROGRAM

## 2023-07-23 PROCEDURE — 2060000000 HC ICU INTERMEDIATE R&B

## 2023-07-23 PROCEDURE — 6360000002 HC RX W HCPCS: Performed by: NURSE PRACTITIONER

## 2023-07-23 PROCEDURE — 85027 COMPLETE CBC AUTOMATED: CPT

## 2023-07-23 PROCEDURE — 6360000004 HC RX CONTRAST MEDICATION: Performed by: STUDENT IN AN ORGANIZED HEALTH CARE EDUCATION/TRAINING PROGRAM

## 2023-07-23 PROCEDURE — 86140 C-REACTIVE PROTEIN: CPT

## 2023-07-23 PROCEDURE — 99232 SBSQ HOSP IP/OBS MODERATE 35: CPT | Performed by: STUDENT IN AN ORGANIZED HEALTH CARE EDUCATION/TRAINING PROGRAM

## 2023-07-23 PROCEDURE — 84100 ASSAY OF PHOSPHORUS: CPT

## 2023-07-23 PROCEDURE — 99231 SBSQ HOSP IP/OBS SF/LOW 25: CPT | Performed by: SURGERY

## 2023-07-23 PROCEDURE — 36415 COLL VENOUS BLD VENIPUNCTURE: CPT

## 2023-07-23 PROCEDURE — 85610 PROTHROMBIN TIME: CPT

## 2023-07-23 PROCEDURE — 80053 COMPREHEN METABOLIC PANEL: CPT

## 2023-07-23 PROCEDURE — 83880 ASSAY OF NATRIURETIC PEPTIDE: CPT

## 2023-07-23 RX ADMIN — HYDROMORPHONE HYDROCHLORIDE 0.5 MG: 1 INJECTION, SOLUTION INTRAMUSCULAR; INTRAVENOUS; SUBCUTANEOUS at 13:10

## 2023-07-23 RX ADMIN — TRAMADOL HYDROCHLORIDE 50 MG: 50 TABLET, COATED ORAL at 05:43

## 2023-07-23 RX ADMIN — ACETAMINOPHEN 1000 MG: 500 TABLET ORAL at 04:37

## 2023-07-23 RX ADMIN — IOPAMIDOL 75 ML: 755 INJECTION, SOLUTION INTRAVENOUS at 11:26

## 2023-07-23 RX ADMIN — SODIUM CHLORIDE, PRESERVATIVE FREE 40 MG: 5 INJECTION INTRAVENOUS at 10:00

## 2023-07-23 RX ADMIN — HYDROMORPHONE HYDROCHLORIDE 0.5 MG: 1 INJECTION, SOLUTION INTRAMUSCULAR; INTRAVENOUS; SUBCUTANEOUS at 01:41

## 2023-07-23 RX ADMIN — HYDROMORPHONE HYDROCHLORIDE 0.5 MG: 1 INJECTION, SOLUTION INTRAMUSCULAR; INTRAVENOUS; SUBCUTANEOUS at 22:38

## 2023-07-23 RX ADMIN — MYCOPHENOLATE MOFETIL 300 MG: 500 TABLET ORAL at 14:44

## 2023-07-23 RX ADMIN — ACETAMINOPHEN 1000 MG: 500 TABLET ORAL at 11:08

## 2023-07-23 RX ADMIN — ENOXAPARIN SODIUM 80 MG: 100 INJECTION SUBCUTANEOUS at 10:01

## 2023-07-23 RX ADMIN — SODIUM CHLORIDE, PRESERVATIVE FREE 40 MG: 5 INJECTION INTRAVENOUS at 19:44

## 2023-07-23 RX ADMIN — Medication 100 MG: at 10:01

## 2023-07-23 RX ADMIN — HYDROMORPHONE HYDROCHLORIDE 0.5 MG: 1 INJECTION, SOLUTION INTRAMUSCULAR; INTRAVENOUS; SUBCUTANEOUS at 09:59

## 2023-07-23 RX ADMIN — MYCOPHENOLATE MOFETIL 300 MG: 500 TABLET ORAL at 20:45

## 2023-07-23 RX ADMIN — HYDROMORPHONE HYDROCHLORIDE 0.5 MG: 1 INJECTION, SOLUTION INTRAMUSCULAR; INTRAVENOUS; SUBCUTANEOUS at 19:42

## 2023-07-23 RX ADMIN — ENOXAPARIN SODIUM 80 MG: 100 INJECTION SUBCUTANEOUS at 20:44

## 2023-07-23 RX ADMIN — ACETAMINOPHEN 1000 MG: 500 TABLET ORAL at 19:42

## 2023-07-23 RX ADMIN — HYDROMORPHONE HYDROCHLORIDE 0.5 MG: 1 INJECTION, SOLUTION INTRAMUSCULAR; INTRAVENOUS; SUBCUTANEOUS at 04:55

## 2023-07-23 RX ADMIN — TRAMADOL HYDROCHLORIDE 50 MG: 50 TABLET, COATED ORAL at 20:45

## 2023-07-23 RX ADMIN — MYCOPHENOLATE MOFETIL 300 MG: 500 TABLET ORAL at 10:01

## 2023-07-23 RX ADMIN — FOLIC ACID 1 MG: 1 TABLET ORAL at 10:01

## 2023-07-23 RX ADMIN — HYDROMORPHONE HYDROCHLORIDE 0.5 MG: 1 INJECTION, SOLUTION INTRAMUSCULAR; INTRAVENOUS; SUBCUTANEOUS at 16:37

## 2023-07-23 ASSESSMENT — PAIN SCALES - GENERAL
PAINLEVEL_OUTOF10: 8
PAINLEVEL_OUTOF10: 7
PAINLEVEL_OUTOF10: 7
PAINLEVEL_OUTOF10: 8
PAINLEVEL_OUTOF10: 9
PAINLEVEL_OUTOF10: 8
PAINLEVEL_OUTOF10: 9
PAINLEVEL_OUTOF10: 9
PAINLEVEL_OUTOF10: 8
PAINLEVEL_OUTOF10: 9
PAINLEVEL_OUTOF10: 8
PAINLEVEL_OUTOF10: 6

## 2023-07-23 ASSESSMENT — PAIN DESCRIPTION - DESCRIPTORS
DESCRIPTORS: CRAMPING
DESCRIPTORS: ACHING
DESCRIPTORS: ACHING
DESCRIPTORS: PRESSURE
DESCRIPTORS: PRESSURE
DESCRIPTORS: ACHING
DESCRIPTORS: SQUEEZING;PRESSURE

## 2023-07-23 ASSESSMENT — PAIN DESCRIPTION - ORIENTATION
ORIENTATION: LOWER
ORIENTATION: MID
ORIENTATION: MID
ORIENTATION: LOWER;MID
ORIENTATION: MID;LOWER
ORIENTATION: MID;LOWER

## 2023-07-23 ASSESSMENT — PAIN DESCRIPTION - LOCATION
LOCATION: ABDOMEN
LOCATION: BACK

## 2023-07-23 ASSESSMENT — PAIN - FUNCTIONAL ASSESSMENT
PAIN_FUNCTIONAL_ASSESSMENT: ACTIVITIES ARE NOT PREVENTED
PAIN_FUNCTIONAL_ASSESSMENT: ACTIVITIES ARE NOT PREVENTED

## 2023-07-23 NOTE — PLAN OF CARE
Problem: Discharge Planning  Goal: Discharge to home or other facility with appropriate resources  7/23/2023 1715 by Grace Aly RN  Outcome: Progressing  7/23/2023 0554 by Corbin Johnson RN  Outcome: Progressing     Problem: Pain  Goal: Verbalizes/displays adequate comfort level or baseline comfort level  7/23/2023 1715 by Grace Aly RN  Outcome: Progressing  7/23/2023 0554 by Corbin Johnson RN  Outcome: Progressing     Problem: Safety - Adult  Goal: Free from fall injury  7/23/2023 1715 by Grace Aly RN  Outcome: Progressing  7/23/2023 0554 by Corbin Johnson RN  Outcome: Progressing

## 2023-07-23 NOTE — PROGRESS NOTES
[facility]  Office: [address], DO, Fritz Benson, DO, Danita Parikh, DO, Ari Andrews Blood, DO, Uche Gerber MD, Tierra Newsome MD, Jerome Rangel MD, Mabel Cartwright MD,  Doreen Cole MD, Sandrita Capone MD, Jessica Gambino DO, Benito Malcolm MD,  Ganga Simpson MD, Klever Hilton MD, Kelli Guajardo DO, Cydney Buenrostro MD,  Soto Harris DO, Adithya Quinones MD, Karmen Rico MD, Kevin Wright MD, Neda Tavares MD,  Cami Motley MD, Arun Cowart MD, Carol Barahona DO, Vanessa Lin MD,  Khloe Mondragon MD, Bacilio Belle, Malia Raygoza, CNP, Dian Horne, CNP, Kalani Ramon, CNP,  Hunter Marc, DNP, Ehsan Gore, CNP, Quinton Wills, CNP, Abdullahi Atkins, CNP, Abundio Lin, CNP, Patience Thomas, CNP, Shanna Mcneal PA-C, Valentina Carrasco, [facility], Love Manzanares, CNP, Soheila Alfaro, [address] Devlin    Progress Note    7/23/2023    12:08 PM    Name:   Ollie Hui  MRN:     [id number]     Acct:      [de-identified]   Room:   [address] [place] [address] Day:  2  Admit Date:  7/21/2023  1:53 AM    PCP:   No primary care provider on file. Code Status:  Full Code    Subjective:     C/C:   Chief Complaint   Patient presents with    Chest Pain     Interval History Status: not changed. Patient seen and examined. Patient states that he still has pain but is not unbearable. Patient states that pain increases throughout the day and sometimes gets out of control. Pain medications do help. No nausea or vomiting. Eating well. He is ready for procedure tomorrow. Vitals and labs reviewed. Brief History:     65-year-old male with known medical history of GERD, Josefine Young anemia, newly diagnosed pancreatic mass, concerning for ductal adenocarcinoma presents to the hospital with abdominal pain. Abdominal pain has been ongoing for 9 months. Patient has left AMA on multiple occasions last month.

## 2023-07-23 NOTE — PLAN OF CARE
Problem: Discharge Planning  Goal: Discharge to home or other facility with appropriate resources  7/23/2023 0554 by Oumou Faye RN  Outcome: Progressing  7/22/2023 1805 by Sen Ga RN  Outcome: Progressing     Problem: Pain  Goal: Verbalizes/displays adequate comfort level or baseline comfort level  7/23/2023 0554 by Oumou Faye RN  Outcome: Progressing  7/22/2023 1805 by Sen Ga RN  Outcome: Progressing     Problem: Safety - Adult  Goal: Free from fall injury  7/23/2023 0554 by Oumou Faye RN  Outcome: Progressing  7/22/2023 1805 by Sen Ga RN  Outcome: Progressing

## 2023-07-23 NOTE — CONSULTS
Today's Date: 7/23/2023  Patient Name: Jennifer Peralta  Date of admission: 7/21/2023  1:53 AM  Patient's age: 66 y.o., 1965  Admission Dx: Pancreatitis, unspecified pancreatitis type [K85.90]  Acute pancreatitis, unspecified complication status, unspecified pancreatitis type [K85.90]    Reason for Consult: management recommendations  Requesting Physician: Patricia Weiss MD    Reason for the consult: Pancreatic mass    History Obtained From:  patient    HISTORY OF PRESENT ILLNESS:      The patient is a 66 y.o.  male who is admitted to the hospital for chest pain/abdominal pain and found to have pancreatitis he does have history of GERD schizophrenia and history of pancreatic mass with multiple admission recently and leaving [address] [place] [address] he presented back to the hospital with upper abdominal lower chest pain which has been midsternal, epigastric, nonradiating, sharp pain which changes in intensity throughout the day. No relieving factor. Patient states that he has been dealing with abdominal pain on and off for 9 months. He has lost around 20 pounds. Does not report any shortness of breath, no cough. No abnormal bowel movements. States that he has quit smoking and hardly drinks any alcohol. No did show LFTs trending up  There was a question about the PE subsegmental on the CT however CTA was done and no longer demonstrated the filling defect  There is consolidative type opacity on the right middle and right lower lobe consistent with progression pneumonitis      Past Medical History:   has a past medical history of Abdominal pain, COVID-19 vaccine series completed, Duodenitis, Elevated CEA, Fatty liver, GERD (gastroesophageal reflux disease), History of recent hospitalization, Retroperitoneal mass, and Weight loss. Past Surgical History:   has a past surgical history that includes Hand surgery and Urethra surgery.      Medications:    Reviewed in Epic     Allergies:  Aspirin and

## 2023-07-23 NOTE — PROGRESS NOTES
I saw and evaluated the patient, performing the key elements of the service. I discussed the findings, assessment and plan with the nurse practitioner/resident and agree with the their findings and plan as documented in the their note. Pancreatic head mass with double duct sign, LFTs trending up  Plan for EUS/ERCP on Monday per Dr. Kristin Prescott  NPO after MN  Multimodality pain control  Daily labs including inflammatory markers  No indication for antibiotics  Continue PPI  Concern for subsegmental PE on CT scan, LE DVT scan neg  Consult Dr. Francheska Mcpherson from medical oncology    Time spent caring for patient 25 minutes    I spent greater than 50% of the visit coordinating care and answering all questions from patient, and family members, reviewing the possible outcome of the treatment. Reji Etienne MD DABS  Surgical Oncology/HPB Surgery  [facility]  [address]  [facility] [address]  Office:  [phone]  Fax:  [phone]  7/23/2023  8:38 AM

## 2023-07-24 ENCOUNTER — ANESTHESIA EVENT (OUTPATIENT)
Dept: OPERATING ROOM | Age: 58
End: 2023-07-24
Payer: MEDICARE

## 2023-07-24 ENCOUNTER — APPOINTMENT (OUTPATIENT)
Dept: GENERAL RADIOLOGY | Age: 58
End: 2023-07-24
Payer: MEDICARE

## 2023-07-24 ENCOUNTER — APPOINTMENT (OUTPATIENT)
Dept: ULTRASOUND IMAGING | Age: 58
End: 2023-07-24
Payer: MEDICARE

## 2023-07-24 ENCOUNTER — ANESTHESIA (OUTPATIENT)
Dept: OPERATING ROOM | Age: 58
End: 2023-07-24
Payer: MEDICARE

## 2023-07-24 PROBLEM — R00.0 TACHYCARDIA: Status: ACTIVE | Noted: 2023-07-24

## 2023-07-24 PROBLEM — A41.9 SEPSIS (HCC): Status: ACTIVE | Noted: 2023-07-24

## 2023-07-24 LAB
ALBUMIN SERPL-MCNC: 3.5 G/DL (ref 3.5–5.2)
ALBUMIN/GLOB SERPL: 1.2 {RATIO} (ref 1–2.5)
ALP SERPL-CCNC: 342 U/L (ref 40–129)
ALT SERPL-CCNC: 87 U/L (ref 5–41)
ANION GAP SERPL CALCULATED.3IONS-SCNC: 11 MMOL/L (ref 9–17)
AST SERPL-CCNC: 42 U/L
B PARAP IS1001 DNA NPH QL NAA+NON-PROBE: NOT DETECTED
B PERT DNA SPEC QL NAA+PROBE: NOT DETECTED
BASOPHILS # BLD: <0.03 K/UL (ref 0–0.2)
BASOPHILS NFR BLD: 0 % (ref 0–2)
BILIRUB SERPL-MCNC: 2.9 MG/DL (ref 0.3–1.2)
BNP SERPL-MCNC: 514 PG/ML
BUN SERPL-MCNC: 8 MG/DL (ref 6–20)
C PNEUM DNA NPH QL NAA+NON-PROBE: NOT DETECTED
CALCIUM SERPL-MCNC: 9.5 MG/DL (ref 8.6–10.4)
CHLORIDE SERPL-SCNC: 102 MMOL/L (ref 98–107)
CO2 SERPL-SCNC: 24 MMOL/L (ref 20–31)
CREAT SERPL-MCNC: 0.6 MG/DL (ref 0.7–1.2)
CRP SERPL HS-MCNC: 65.3 MG/L (ref 0–5)
EOSINOPHIL # BLD: 0.07 K/UL (ref 0–0.44)
EOSINOPHILS RELATIVE PERCENT: 1 % (ref 1–4)
ERYTHROCYTE [DISTWIDTH] IN BLOOD BY AUTOMATED COUNT: 14.9 % (ref 11.8–14.4)
FLUAV RNA NPH QL NAA+NON-PROBE: NOT DETECTED
FLUBV RNA NPH QL NAA+NON-PROBE: NOT DETECTED
GFR SERPL CREATININE-BSD FRML MDRD: >60 ML/MIN/1.73M2
GLUCOSE SERPL-MCNC: 94 MG/DL (ref 70–99)
HADV DNA NPH QL NAA+NON-PROBE: NOT DETECTED
HCOV 229E RNA NPH QL NAA+NON-PROBE: NOT DETECTED
HCOV HKU1 RNA NPH QL NAA+NON-PROBE: NOT DETECTED
HCOV NL63 RNA NPH QL NAA+NON-PROBE: NOT DETECTED
HCOV OC43 RNA NPH QL NAA+NON-PROBE: NOT DETECTED
HCT VFR BLD AUTO: 40.7 % (ref 40.7–50.3)
HGB BLD-MCNC: 13.1 G/DL (ref 13–17)
HMPV RNA NPH QL NAA+NON-PROBE: NOT DETECTED
HPIV1 RNA NPH QL NAA+NON-PROBE: NOT DETECTED
HPIV2 RNA NPH QL NAA+NON-PROBE: NOT DETECTED
HPIV3 RNA NPH QL NAA+NON-PROBE: NOT DETECTED
HPIV4 RNA NPH QL NAA+NON-PROBE: NOT DETECTED
IMM GRANULOCYTES # BLD AUTO: <0.03 K/UL (ref 0–0.3)
IMM GRANULOCYTES NFR BLD: 0 %
INR PPP: 1
LYMPHOCYTES NFR BLD: 1.35 K/UL (ref 1.1–3.7)
LYMPHOCYTES RELATIVE PERCENT: 25 % (ref 24–43)
M PNEUMO DNA NPH QL NAA+NON-PROBE: NOT DETECTED
MAGNESIUM SERPL-MCNC: 2 MG/DL (ref 1.6–2.6)
MAGNESIUM SERPL-MCNC: 2.3 MG/DL (ref 1.6–2.6)
MCH RBC QN AUTO: 28.8 PG (ref 25.2–33.5)
MCHC RBC AUTO-ENTMCNC: 32.2 G/DL (ref 28.4–34.8)
MCV RBC AUTO: 89.5 FL (ref 82.6–102.9)
MONOCYTES NFR BLD: 0.22 K/UL (ref 0.1–1.2)
MONOCYTES NFR BLD: 4 % (ref 3–12)
NEUTROPHILS NFR BLD: 69 % (ref 36–65)
NEUTS SEG NFR BLD: 3.7 K/UL (ref 1.5–8.1)
NRBC BLD-RTO: 0 PER 100 WBC
PHOSPHATE SERPL-MCNC: 3.7 MG/DL (ref 2.5–4.5)
PLATELET # BLD AUTO: 325 K/UL (ref 138–453)
PMV BLD AUTO: 9.6 FL (ref 8.1–13.5)
POTASSIUM SERPL-SCNC: 4.3 MMOL/L (ref 3.7–5.3)
POTASSIUM SERPL-SCNC: 4.7 MMOL/L (ref 3.7–5.3)
PROCALCITONIN SERPL-MCNC: 0.37 NG/ML
PROT SERPL-MCNC: 6.5 G/DL (ref 6.4–8.3)
PROTHROMBIN TIME: 13.3 SEC (ref 11.7–14.9)
RBC # BLD AUTO: 4.55 M/UL (ref 4.21–5.77)
RBC # BLD: ABNORMAL 10*6/UL
RSV RNA NPH QL NAA+NON-PROBE: NOT DETECTED
RV+EV RNA NPH QL NAA+NON-PROBE: NOT DETECTED
SARS-COV-2 RNA NPH QL NAA+NON-PROBE: NOT DETECTED
SODIUM SERPL-SCNC: 137 MMOL/L (ref 135–144)
SPECIMEN DESCRIPTION: NORMAL
WBC OTHER # BLD: 5.4 K/UL (ref 3.5–11.3)

## 2023-07-24 PROCEDURE — 88172 CYTP DX EVAL FNA 1ST EA SITE: CPT

## 2023-07-24 PROCEDURE — 0FBG8ZX EXCISION OF PANCREAS, VIA NATURAL OR ARTIFICIAL OPENING ENDOSCOPIC, DIAGNOSTIC: ICD-10-PCS | Performed by: INTERNAL MEDICINE

## 2023-07-24 PROCEDURE — BF101ZZ FLUOROSCOPY OF BILE DUCTS USING LOW OSMOLAR CONTRAST: ICD-10-PCS | Performed by: INTERNAL MEDICINE

## 2023-07-24 PROCEDURE — 2500000003 HC RX 250 WO HCPCS: Performed by: NURSE ANESTHETIST, CERTIFIED REGISTERED

## 2023-07-24 PROCEDURE — 6360000002 HC RX W HCPCS: Performed by: NURSE ANESTHETIST, CERTIFIED REGISTERED

## 2023-07-24 PROCEDURE — 2720000010 HC SURG SUPPLY STERILE: Performed by: INTERNAL MEDICINE

## 2023-07-24 PROCEDURE — 43242 EGD US FINE NEEDLE BX/ASPIR: CPT | Performed by: INTERNAL MEDICINE

## 2023-07-24 PROCEDURE — 2580000003 HC RX 258: Performed by: NURSE PRACTITIONER

## 2023-07-24 PROCEDURE — 99232 SBSQ HOSP IP/OBS MODERATE 35: CPT | Performed by: STUDENT IN AN ORGANIZED HEALTH CARE EDUCATION/TRAINING PROGRAM

## 2023-07-24 PROCEDURE — 6370000000 HC RX 637 (ALT 250 FOR IP): Performed by: SURGERY

## 2023-07-24 PROCEDURE — 2060000000 HC ICU INTERMEDIATE R&B

## 2023-07-24 PROCEDURE — 99232 SBSQ HOSP IP/OBS MODERATE 35: CPT | Performed by: INTERNAL MEDICINE

## 2023-07-24 PROCEDURE — C1874 STENT, COATED/COV W/DEL SYS: HCPCS | Performed by: INTERNAL MEDICINE

## 2023-07-24 PROCEDURE — C1769 GUIDE WIRE: HCPCS | Performed by: INTERNAL MEDICINE

## 2023-07-24 PROCEDURE — 84100 ASSAY OF PHOSPHORUS: CPT

## 2023-07-24 PROCEDURE — 6360000002 HC RX W HCPCS: Performed by: INTERNAL MEDICINE

## 2023-07-24 PROCEDURE — 2580000003 HC RX 258: Performed by: NURSE ANESTHETIST, CERTIFIED REGISTERED

## 2023-07-24 PROCEDURE — C9113 INJ PANTOPRAZOLE SODIUM, VIA: HCPCS | Performed by: NURSE PRACTITIONER

## 2023-07-24 PROCEDURE — 2580000003 HC RX 258: Performed by: STUDENT IN AN ORGANIZED HEALTH CARE EDUCATION/TRAINING PROGRAM

## 2023-07-24 PROCEDURE — 0202U NFCT DS 22 TRGT SARS-COV-2: CPT

## 2023-07-24 PROCEDURE — 3609015100 HC ERCP STENT PLACEMENT BILIARY/PANCREATIC DUCT: Performed by: INTERNAL MEDICINE

## 2023-07-24 PROCEDURE — 88173 CYTOPATH EVAL FNA REPORT: CPT

## 2023-07-24 PROCEDURE — 6360000004 HC RX CONTRAST MEDICATION: Performed by: INTERNAL MEDICINE

## 2023-07-24 PROCEDURE — 43264 ERCP REMOVE DUCT CALCULI: CPT | Performed by: INTERNAL MEDICINE

## 2023-07-24 PROCEDURE — 85027 COMPLETE CBC AUTOMATED: CPT

## 2023-07-24 PROCEDURE — 83735 ASSAY OF MAGNESIUM: CPT

## 2023-07-24 PROCEDURE — 76975 GI ENDOSCOPIC ULTRASOUND: CPT | Performed by: INTERNAL MEDICINE

## 2023-07-24 PROCEDURE — 84145 PROCALCITONIN (PCT): CPT

## 2023-07-24 PROCEDURE — 6370000000 HC RX 637 (ALT 250 FOR IP): Performed by: INTERNAL MEDICINE

## 2023-07-24 PROCEDURE — 86140 C-REACTIVE PROTEIN: CPT

## 2023-07-24 PROCEDURE — 7100000000 HC PACU RECOVERY - FIRST 15 MIN: Performed by: INTERNAL MEDICINE

## 2023-07-24 PROCEDURE — 2580000003 HC RX 258: Performed by: INTERNAL MEDICINE

## 2023-07-24 PROCEDURE — 88305 TISSUE EXAM BY PATHOLOGIST: CPT

## 2023-07-24 PROCEDURE — 84132 ASSAY OF SERUM POTASSIUM: CPT

## 2023-07-24 PROCEDURE — 43274 ERCP DUCT STENT PLACEMENT: CPT | Performed by: INTERNAL MEDICINE

## 2023-07-24 PROCEDURE — 3700000001 HC ADD 15 MINUTES (ANESTHESIA): Performed by: INTERNAL MEDICINE

## 2023-07-24 PROCEDURE — 3609020800 HC EGD W/EUS FNA: Performed by: INTERNAL MEDICINE

## 2023-07-24 PROCEDURE — APPSS60 APP SPLIT SHARED TIME 46-60 MINUTES: Performed by: NURSE PRACTITIONER

## 2023-07-24 PROCEDURE — 88342 IMHCHEM/IMCYTCHM 1ST ANTB: CPT

## 2023-07-24 PROCEDURE — 6360000002 HC RX W HCPCS: Performed by: STUDENT IN AN ORGANIZED HEALTH CARE EDUCATION/TRAINING PROGRAM

## 2023-07-24 PROCEDURE — 6360000002 HC RX W HCPCS

## 2023-07-24 PROCEDURE — 0DB98ZX EXCISION OF DUODENUM, VIA NATURAL OR ARTIFICIAL OPENING ENDOSCOPIC, DIAGNOSTIC: ICD-10-PCS | Performed by: INTERNAL MEDICINE

## 2023-07-24 PROCEDURE — 0F798DZ DILATION OF COMMON BILE DUCT WITH INTRALUMINAL DEVICE, VIA NATURAL OR ARTIFICIAL OPENING ENDOSCOPIC: ICD-10-PCS | Performed by: INTERNAL MEDICINE

## 2023-07-24 PROCEDURE — 43262 ENDO CHOLANGIOPANCREATOGRAPH: CPT | Performed by: INTERNAL MEDICINE

## 2023-07-24 PROCEDURE — 83880 ASSAY OF NATRIURETIC PEPTIDE: CPT

## 2023-07-24 PROCEDURE — 6360000002 HC RX W HCPCS: Performed by: SURGERY

## 2023-07-24 PROCEDURE — 80053 COMPREHEN METABOLIC PANEL: CPT

## 2023-07-24 PROCEDURE — BF47ZZZ ULTRASONOGRAPHY OF PANCREAS: ICD-10-PCS | Performed by: INTERNAL MEDICINE

## 2023-07-24 PROCEDURE — 6360000002 HC RX W HCPCS: Performed by: NURSE PRACTITIONER

## 2023-07-24 PROCEDURE — 99231 SBSQ HOSP IP/OBS SF/LOW 25: CPT | Performed by: SURGERY

## 2023-07-24 PROCEDURE — 87040 BLOOD CULTURE FOR BACTERIA: CPT

## 2023-07-24 PROCEDURE — 2709999900 HC NON-CHARGEABLE SUPPLY: Performed by: INTERNAL MEDICINE

## 2023-07-24 PROCEDURE — 43239 EGD BIOPSY SINGLE/MULTIPLE: CPT | Performed by: INTERNAL MEDICINE

## 2023-07-24 PROCEDURE — C9399 UNCLASSIFIED DRUGS OR BIOLOG: HCPCS | Performed by: NURSE ANESTHETIST, CERTIFIED REGISTERED

## 2023-07-24 PROCEDURE — 94640 AIRWAY INHALATION TREATMENT: CPT

## 2023-07-24 PROCEDURE — 3609012400 HC EGD TRANSORAL BIOPSY SINGLE/MULTIPLE: Performed by: INTERNAL MEDICINE

## 2023-07-24 PROCEDURE — 94761 N-INVAS EAR/PLS OXIMETRY MLT: CPT

## 2023-07-24 PROCEDURE — 7100000001 HC PACU RECOVERY - ADDTL 15 MIN: Performed by: INTERNAL MEDICINE

## 2023-07-24 PROCEDURE — 2700000000 HC OXYGEN THERAPY PER DAY

## 2023-07-24 PROCEDURE — C9113 INJ PANTOPRAZOLE SODIUM, VIA: HCPCS | Performed by: INTERNAL MEDICINE

## 2023-07-24 PROCEDURE — 2580000003 HC RX 258: Performed by: SURGERY

## 2023-07-24 PROCEDURE — 85610 PROTHROMBIN TIME: CPT

## 2023-07-24 PROCEDURE — 3700000000 HC ANESTHESIA ATTENDED CARE: Performed by: INTERNAL MEDICINE

## 2023-07-24 PROCEDURE — 6370000000 HC RX 637 (ALT 250 FOR IP): Performed by: NURSE PRACTITIONER

## 2023-07-24 PROCEDURE — 36415 COLL VENOUS BLD VENIPUNCTURE: CPT

## 2023-07-24 DEVICE — STENT SYSTEM
Type: IMPLANTABLE DEVICE | Status: FUNCTIONAL
Brand: WALLFLEX™ BILIARY

## 2023-07-24 RX ORDER — OXYCODONE HYDROCHLORIDE 5 MG/1
5 TABLET ORAL EVERY 4 HOURS PRN
Status: DISCONTINUED | OUTPATIENT
Start: 2023-07-24 | End: 2023-07-28 | Stop reason: HOSPADM

## 2023-07-24 RX ORDER — ALBUTEROL SULFATE 2.5 MG/3ML
SOLUTION RESPIRATORY (INHALATION)
Status: COMPLETED
Start: 2023-07-24 | End: 2023-07-24

## 2023-07-24 RX ORDER — DEXAMETHASONE SODIUM PHOSPHATE 10 MG/ML
INJECTION INTRAMUSCULAR; INTRAVENOUS PRN
Status: DISCONTINUED | OUTPATIENT
Start: 2023-07-24 | End: 2023-07-24 | Stop reason: SDUPTHER

## 2023-07-24 RX ORDER — MIDAZOLAM HYDROCHLORIDE 2 MG/2ML
2 INJECTION, SOLUTION INTRAMUSCULAR; INTRAVENOUS
Status: DISCONTINUED | OUTPATIENT
Start: 2023-07-24 | End: 2023-07-24 | Stop reason: HOSPADM

## 2023-07-24 RX ORDER — GLUCAGON 1 MG/ML
KIT INJECTION PRN
Status: DISCONTINUED | OUTPATIENT
Start: 2023-07-24 | End: 2023-07-24 | Stop reason: SDUPTHER

## 2023-07-24 RX ORDER — FENTANYL CITRATE 50 UG/ML
INJECTION, SOLUTION INTRAMUSCULAR; INTRAVENOUS PRN
Status: DISCONTINUED | OUTPATIENT
Start: 2023-07-24 | End: 2023-07-24 | Stop reason: SDUPTHER

## 2023-07-24 RX ORDER — ROCURONIUM BROMIDE 10 MG/ML
INJECTION, SOLUTION INTRAVENOUS PRN
Status: DISCONTINUED | OUTPATIENT
Start: 2023-07-24 | End: 2023-07-24 | Stop reason: SDUPTHER

## 2023-07-24 RX ORDER — PHENYLEPHRINE HCL IN 0.9% NACL 1 MG/10 ML
SYRINGE (ML) INTRAVENOUS PRN
Status: DISCONTINUED | OUTPATIENT
Start: 2023-07-24 | End: 2023-07-24 | Stop reason: SDUPTHER

## 2023-07-24 RX ORDER — SODIUM CHLORIDE, SODIUM LACTATE, POTASSIUM CHLORIDE, CALCIUM CHLORIDE 600; 310; 30; 20 MG/100ML; MG/100ML; MG/100ML; MG/100ML
INJECTION, SOLUTION INTRAVENOUS CONTINUOUS PRN
Status: DISCONTINUED | OUTPATIENT
Start: 2023-07-24 | End: 2023-07-24 | Stop reason: SDUPTHER

## 2023-07-24 RX ORDER — ALBUTEROL SULFATE 2.5 MG/3ML
2.5 SOLUTION RESPIRATORY (INHALATION) ONCE
Status: COMPLETED | OUTPATIENT
Start: 2023-07-24 | End: 2023-07-24

## 2023-07-24 RX ORDER — FENTANYL CITRATE 50 UG/ML
25 INJECTION, SOLUTION INTRAMUSCULAR; INTRAVENOUS EVERY 5 MIN PRN
Status: DISCONTINUED | OUTPATIENT
Start: 2023-07-24 | End: 2023-07-24 | Stop reason: HOSPADM

## 2023-07-24 RX ORDER — ENOXAPARIN SODIUM 100 MG/ML
40 INJECTION SUBCUTANEOUS DAILY
Status: DISCONTINUED | OUTPATIENT
Start: 2023-07-24 | End: 2023-07-28 | Stop reason: HOSPADM

## 2023-07-24 RX ORDER — PROPOFOL 10 MG/ML
INJECTION, EMULSION INTRAVENOUS PRN
Status: DISCONTINUED | OUTPATIENT
Start: 2023-07-24 | End: 2023-07-24 | Stop reason: SDUPTHER

## 2023-07-24 RX ORDER — LIDOCAINE HYDROCHLORIDE 10 MG/ML
INJECTION, SOLUTION EPIDURAL; INFILTRATION; INTRACAUDAL; PERINEURAL PRN
Status: DISCONTINUED | OUTPATIENT
Start: 2023-07-24 | End: 2023-07-24 | Stop reason: SDUPTHER

## 2023-07-24 RX ORDER — METOCLOPRAMIDE HYDROCHLORIDE 5 MG/ML
10 INJECTION INTRAMUSCULAR; INTRAVENOUS
Status: DISCONTINUED | OUTPATIENT
Start: 2023-07-24 | End: 2023-07-28

## 2023-07-24 RX ORDER — DROPERIDOL 2.5 MG/ML
0.62 INJECTION, SOLUTION INTRAMUSCULAR; INTRAVENOUS
Status: DISCONTINUED | OUTPATIENT
Start: 2023-07-24 | End: 2023-07-24 | Stop reason: HOSPADM

## 2023-07-24 RX ORDER — LABETALOL HYDROCHLORIDE 5 MG/ML
10 INJECTION, SOLUTION INTRAVENOUS
Status: DISCONTINUED | OUTPATIENT
Start: 2023-07-24 | End: 2023-07-24 | Stop reason: HOSPADM

## 2023-07-24 RX ORDER — DIPHENHYDRAMINE HYDROCHLORIDE 50 MG/ML
12.5 INJECTION INTRAMUSCULAR; INTRAVENOUS
Status: DISCONTINUED | OUTPATIENT
Start: 2023-07-24 | End: 2023-07-24 | Stop reason: HOSPADM

## 2023-07-24 RX ORDER — 0.9 % SODIUM CHLORIDE 0.9 %
500 INTRAVENOUS SOLUTION INTRAVENOUS ONCE
Status: COMPLETED | OUTPATIENT
Start: 2023-07-24 | End: 2023-07-24

## 2023-07-24 RX ORDER — SODIUM CHLORIDE 9 MG/ML
INJECTION, SOLUTION INTRAVENOUS PRN
Status: DISCONTINUED | OUTPATIENT
Start: 2023-07-24 | End: 2023-07-24 | Stop reason: HOSPADM

## 2023-07-24 RX ORDER — ONDANSETRON 2 MG/ML
INJECTION INTRAMUSCULAR; INTRAVENOUS PRN
Status: DISCONTINUED | OUTPATIENT
Start: 2023-07-24 | End: 2023-07-24 | Stop reason: SDUPTHER

## 2023-07-24 RX ORDER — OXYCODONE HYDROCHLORIDE 5 MG/1
10 TABLET ORAL EVERY 4 HOURS PRN
Status: DISCONTINUED | OUTPATIENT
Start: 2023-07-24 | End: 2023-07-28 | Stop reason: HOSPADM

## 2023-07-24 RX ORDER — SODIUM CHLORIDE 0.9 % (FLUSH) 0.9 %
5-40 SYRINGE (ML) INJECTION EVERY 12 HOURS SCHEDULED
Status: DISCONTINUED | OUTPATIENT
Start: 2023-07-24 | End: 2023-07-24 | Stop reason: HOSPADM

## 2023-07-24 RX ORDER — PROCHLORPERAZINE EDISYLATE 5 MG/ML
5 INJECTION INTRAMUSCULAR; INTRAVENOUS
Status: DISCONTINUED | OUTPATIENT
Start: 2023-07-24 | End: 2023-07-24 | Stop reason: HOSPADM

## 2023-07-24 RX ORDER — SODIUM CHLORIDE 0.9 % (FLUSH) 0.9 %
5-40 SYRINGE (ML) INJECTION PRN
Status: DISCONTINUED | OUTPATIENT
Start: 2023-07-24 | End: 2023-07-24 | Stop reason: HOSPADM

## 2023-07-24 RX ADMIN — GLUCAGON 1 MG: 1 INJECTION, POWDER, LYOPHILIZED, FOR SOLUTION INTRAMUSCULAR; INTRAVENOUS at 16:51

## 2023-07-24 RX ADMIN — PROPOFOL 200 MG: 10 INJECTION, EMULSION INTRAVENOUS at 15:43

## 2023-07-24 RX ADMIN — Medication 100 MG: at 07:36

## 2023-07-24 RX ADMIN — SODIUM CHLORIDE, POTASSIUM CHLORIDE, SODIUM LACTATE AND CALCIUM CHLORIDE: 600; 310; 30; 20 INJECTION, SOLUTION INTRAVENOUS at 15:35

## 2023-07-24 RX ADMIN — MYCOPHENOLATE MOFETIL 300 MG: 500 TABLET ORAL at 14:14

## 2023-07-24 RX ADMIN — HYDROMORPHONE HYDROCHLORIDE 0.5 MG: 1 INJECTION, SOLUTION INTRAMUSCULAR; INTRAVENOUS; SUBCUTANEOUS at 04:54

## 2023-07-24 RX ADMIN — HYDROMORPHONE HYDROCHLORIDE 0.5 MG: 1 INJECTION, SOLUTION INTRAMUSCULAR; INTRAVENOUS; SUBCUTANEOUS at 01:53

## 2023-07-24 RX ADMIN — SODIUM CHLORIDE, PRESERVATIVE FREE 10 ML: 5 INJECTION INTRAVENOUS at 07:38

## 2023-07-24 RX ADMIN — MYCOPHENOLATE MOFETIL 300 MG: 500 TABLET ORAL at 20:30

## 2023-07-24 RX ADMIN — ONDANSETRON 4 MG: 2 INJECTION INTRAMUSCULAR; INTRAVENOUS at 17:05

## 2023-07-24 RX ADMIN — ACETAMINOPHEN 1000 MG: 500 TABLET ORAL at 01:53

## 2023-07-24 RX ADMIN — SODIUM CHLORIDE, PRESERVATIVE FREE 40 MG: 5 INJECTION INTRAVENOUS at 07:37

## 2023-07-24 RX ADMIN — PIPERACILLIN AND TAZOBACTAM 3375 MG: 3; .375 INJECTION, POWDER, LYOPHILIZED, FOR SOLUTION INTRAVENOUS at 07:59

## 2023-07-24 RX ADMIN — Medication 200 MCG: at 16:08

## 2023-07-24 RX ADMIN — ALBUTEROL SULFATE 2.5 MG: 2.5 SOLUTION RESPIRATORY (INHALATION) at 17:40

## 2023-07-24 RX ADMIN — DEXAMETHASONE SODIUM PHOSPHATE 10 MG: 10 INJECTION INTRAMUSCULAR; INTRAVENOUS at 16:02

## 2023-07-24 RX ADMIN — FENTANYL CITRATE 100 MCG: 0.05 INJECTION, SOLUTION INTRAMUSCULAR; INTRAVENOUS at 15:43

## 2023-07-24 RX ADMIN — ROCURONIUM BROMIDE 50 MG: 10 INJECTION, SOLUTION INTRAVENOUS at 15:43

## 2023-07-24 RX ADMIN — FOLIC ACID 1 MG: 1 TABLET ORAL at 07:37

## 2023-07-24 RX ADMIN — SODIUM CHLORIDE, POTASSIUM CHLORIDE, SODIUM LACTATE AND CALCIUM CHLORIDE: 600; 310; 30; 20 INJECTION, SOLUTION INTRAVENOUS at 22:57

## 2023-07-24 RX ADMIN — Medication 200 MCG: at 16:00

## 2023-07-24 RX ADMIN — METOCLOPRAMIDE HYDROCHLORIDE 10 MG: 5 INJECTION INTRAMUSCULAR; INTRAVENOUS at 20:30

## 2023-07-24 RX ADMIN — Medication 100 MCG: at 15:57

## 2023-07-24 RX ADMIN — IPRATROPIUM BROMIDE 0.5 MG: 0.5 SOLUTION RESPIRATORY (INHALATION) at 12:08

## 2023-07-24 RX ADMIN — Medication 200 MCG: at 16:15

## 2023-07-24 RX ADMIN — ACETAMINOPHEN 1000 MG: 500 TABLET ORAL at 11:14

## 2023-07-24 RX ADMIN — Medication 2 G: at 16:40

## 2023-07-24 RX ADMIN — IPRATROPIUM BROMIDE 0.5 MG: 0.5 SOLUTION RESPIRATORY (INHALATION) at 21:11

## 2023-07-24 RX ADMIN — METOCLOPRAMIDE HYDROCHLORIDE 10 MG: 5 INJECTION INTRAMUSCULAR; INTRAVENOUS at 12:12

## 2023-07-24 RX ADMIN — FENTANYL CITRATE 150 MCG: 0.05 INJECTION, SOLUTION INTRAMUSCULAR; INTRAVENOUS at 15:49

## 2023-07-24 RX ADMIN — SUGAMMADEX 200 MG: 100 INJECTION, SOLUTION INTRAVENOUS at 17:12

## 2023-07-24 RX ADMIN — SODIUM CHLORIDE, PRESERVATIVE FREE 40 MG: 5 INJECTION INTRAVENOUS at 20:30

## 2023-07-24 RX ADMIN — SODIUM CHLORIDE, PRESERVATIVE FREE 10 ML: 5 INJECTION INTRAVENOUS at 20:31

## 2023-07-24 RX ADMIN — SODIUM CHLORIDE 500 ML: 9 INJECTION, SOLUTION INTRAVENOUS at 09:06

## 2023-07-24 RX ADMIN — MYCOPHENOLATE MOFETIL 300 MG: 500 TABLET ORAL at 07:37

## 2023-07-24 RX ADMIN — PIPERACILLIN AND TAZOBACTAM 3375 MG: 3; .375 INJECTION, POWDER, LYOPHILIZED, FOR SOLUTION INTRAVENOUS at 23:07

## 2023-07-24 RX ADMIN — SODIUM CHLORIDE, POTASSIUM CHLORIDE, SODIUM LACTATE AND CALCIUM CHLORIDE: 600; 310; 30; 20 INJECTION, SOLUTION INTRAVENOUS at 16:43

## 2023-07-24 RX ADMIN — LIDOCAINE HYDROCHLORIDE 50 MG: 10 INJECTION, SOLUTION EPIDURAL; INFILTRATION; INTRACAUDAL; PERINEURAL at 15:43

## 2023-07-24 RX ADMIN — ACETAMINOPHEN 1000 MG: 500 TABLET ORAL at 20:30

## 2023-07-24 ASSESSMENT — PAIN DESCRIPTION - ORIENTATION
ORIENTATION: MID
ORIENTATION: MID

## 2023-07-24 ASSESSMENT — PAIN DESCRIPTION - DESCRIPTORS
DESCRIPTORS: PRESSURE
DESCRIPTORS: ACHING
DESCRIPTORS: PRESSURE

## 2023-07-24 ASSESSMENT — PAIN SCALES - GENERAL
PAINLEVEL_OUTOF10: 5
PAINLEVEL_OUTOF10: 9
PAINLEVEL_OUTOF10: 9
PAINLEVEL_OUTOF10: 5
PAINLEVEL_OUTOF10: 6

## 2023-07-24 ASSESSMENT — PAIN DESCRIPTION - LOCATION
LOCATION: ABDOMEN
LOCATION: ABDOMEN
LOCATION: CHEST

## 2023-07-24 ASSESSMENT — PAIN - FUNCTIONAL ASSESSMENT: PAIN_FUNCTIONAL_ASSESSMENT: NONE - DENIES PAIN

## 2023-07-24 NOTE — OP NOTE
Operative Note      Patient: Sanjiv Henriquez  YOB: 1965  MRN: [id number]    Date of Procedure: 7/24/2023    Pre-Op Diagnosis Codes:     * Pancreatic mass [K86.89]    Post-Op Diagnosis:  Pancreatic head mass with biliary dilation        Procedure(s):  * ADD-ON REQ* ENDOSCOPIC ULTRASOUND WITH PATHOLOGY  ERCP ENDOSCOPIC RETROGRADE CHOLANGIOPANCREATOGRAPHY ( C-ARM)  EGD BIOPSY    Surgeon(s):  Jose Martin Domingo MD    Assistant:   First Assistant: Miladys Benito RN    Anesthesia: Monitor Anesthesia Care    Estimated Blood Loss (mL): Minimal    Complications: None    Specimens:   ID Type Source Tests Collected by Time Destination   A : abnormal mucosa - duodenum bx Tissue Duodenum SURGICAL PATHOLOGY Jose Martin Domingo MD 7/24/2023 1551    B : pancreatic head mass Tissue Pancreas CYTOLOGY, NON-GYN Jose Martin Domingo MD 7/24/2023 1607        Implants:  * No implants in log *      Drains:   [REMOVED] NG/OG/NJ/NE Tube Nasogastric 16 fr Right nostril (Removed)         Description of Procedure:  Informed consent was obtained from the patient after explanation of the procedure including indications, description of the procedure,  benefits and possible risks and complications of the procedure, and alternatives. Questions were answered. The patient's history was reviewed and a directed physical examination was performed prior to the procedure. Patient was monitored throughout the procedure with pulse oximetry and periodic assessment of vital signs. Patient was sedated as noted above. With the patient in the left lateral decubitus position, the Olympus videoendoscope followed by endoechoendoscope was placed in the patient's mouth and under direct visualization passed into the esophagus. The scope was passed to the 2nd portion of the duodenum. The patient tolerated the procedure well and was taken to the recovery area in good condition.     EGD Findings[de-identified]   Esophagus: normal.   Stomach: Medium amount of food material was seen in the body of the stomach. Normal stomach seen during retroflexion view. Duodenum: Abnormal looking duodenal mucosa characterized by localized granularity was seen at the duodenal sweep. Biopsies were done                       [place] second part of the duodenum. EUS findings:  Pancreas: A round mass was identified in the pancreatic head. The mass was hypoechoic, heterogenous and solid. The mass measured 40 mm by 41 mm in maximal cross-sectional diameter. The endosonographic borders were well-defined. There was sonographic evidence suggesting invasion into the portal vein (manifested by abutment). Fine needle biopsy was performed. Color Doppler imaging was utilized prior to needle puncture to confirm a lack of significant vascular structures within the needle path. Six  passes were made with the 25 gauge ultrasound biopsy needle using a transduodenal approach. A stylet was used. A cytologist was present and performed a preliminary cytologic examination. Preliminary cytology is suspicious for malignancy (final results are pending). Estimated blood loss was minimal. Verification of patient identification for the specimen was done by the physician and nurse using the patient's name and medical record number. A round, anechoic , non septated cyst measuring 5 x 8 mm was seen in the body of the pancreas. It was not  communicating with PD  Rest of the pancreas appeared within normal limits. PD with hyperechoic ductal walls . PD in the head 5 mm, in the body 3 mm and in the tail 1.5 mm in maximum cross sectional diameter   CBD: Dilated with abrupt cut off above the pancreatic head mass . CBD diameter: 12 mm. Gallbladder: Extensive sludge present  Left lobe of liver: normal.   Lymphadenopathy: No pathologic lymphadenopathy seen in upper abdomen. Recommendations: Stage T3 N0 MX applies.                                       Follow with the biopsy results

## 2023-07-24 NOTE — PROGRESS NOTES
1925 - pt o2 dropped to 80-88% while on 5L NC. Dr. Artemisa Hodgkins called and RN started SM 10 L - status unchanged.  Dr. Dany Barnett at bedside - RN initiated albuterol treatment per Dr. Jt Perez orders and stating 87-90%

## 2023-07-24 NOTE — PROGRESS NOTES
[facility]  Office: [address], DO, Porter Carlson, DO, Veronica Haines, DO, Darlene Alonso Blood, DO, Arturo Hill MD, Angel Luis Leggett MD, Esther Gallardo MD, Miguel Borrego MD,  Jagjit Ng MD, Elsy Holley MD, Lila Mahmood, DO, Geovanny Sepulveda MD,  Jess Pandya MD, Sam Low MD, Oscar Jimenez DO, Meghann Del Castillo MD,  Eula Almazan DO, Steve Madison MD, Ponce Meléndez MD, Santo Neal MD, Shellie Whalen MD,  Nayan Christy MD, Misty Orozco MD, Dipak Delgadillo, DO, Micah Zimmerman MD,  Javier Bajwa MD, Mendoza Ford, Sigrid Minor, CNP, Pablo Vazquez, CNP, Loreto Lilly, CNP,  Marilyn Almanza, [facility], Ellito Estrella, [facility], Luh Ryder, CNP, Bea Wan, CNP, Zia Beasley, CNP, Evie Lacy, CNP, Wesley Patel PA-C, Graciela Brown, CNS, Matt Pabon, [facility], Rahul Plascencia, [address]    Progress Note    7/24/2023    9:17 AM    Name:   Best Emanuel  MRN:     [id number]     Acct:      [de-identified]   Room:   [address] [place] [address] Day:  3  Admit Date:  7/21/2023  1:53 AM    PCP:   No primary care provider on file. Code Status:  Full Code    Subjective:     C/C:   Chief Complaint   Patient presents with    Chest Pain     Interval History Status: not changed. Patient seen and examined. Patient appears diaphoretic and is not feeling well  Occasional cough  Has shortness of breath and also reports chest pain and abdominal pain  Patient also had loose stools yesterday    Labs and vitals reviewed  Patient is tachycardic heart rate going up to 110, blood pressure 160, patient is anxious  Patient had desaturation in 80s on nasal cannula oxygen. Patient does not have a good waveform for oximeter. Alkaline phosphatase 65.3. ALT 87, AST 42, bilirubin 2.9.     Brief History:     65-year-old male with known medical history of GERD, Dottie Patella anemia, newly diagnosed pancreatic

## 2023-07-24 NOTE — PROGRESS NOTES
I saw and evaluated the patient, performing the key elements of the service. I discussed the findings, assessment and plan with the nurse practitioner/resident and agree with the their findings and plan as documented in the their note. He is complaining of increased abdominal pain  Pancreatic head mass with double duct sign, LFTs trending up, inflammatory markers trending up, stopping antibiotics  Plan for EUS/ERCP today per Dr. Shaw Delgado  NPO  Multimodality pain control  Continue PPI  Concern for subsegmental PE on CT scan, LE DVT scan neg repeat CT PE yesterday was negative but concerning for pneumonitis  Appreciate Dr. Stewart Johnson from medical oncology    Time spent caring for patient 25 minutes    I spent greater than 50% of the visit coordinating care and answering all questions from patient, and family members, reviewing the possible outcome of the treatment. Reji Etienne MD [facility]  Surgical Oncology/HPB Surgery  [facility]  [address]  [facility] [address]  Office:  [phone]  Fax:  [phone]  7/24/2023  7:41 AM

## 2023-07-24 NOTE — PROGRESS NOTES
[facility]  Occupational Therapy Not Seen Note    DATE: 2023    NAME: Daly Tapia  MRN: [id number]   : 1965      Patient not seen this date for Occupational Therapy due to:    Surgery/Procedure: ECRP     Next Scheduled Treatment:Ck in PM or post        Electronically signed by Ron Olivares OT on 2023 at 8:09 AM

## 2023-07-24 NOTE — PROGRESS NOTES
Physician Progress Note      Eusebio Gomez  CSN #:                  [id number]  :                       1965  ADMIT DATE:       2023 1:53 AM  DISCH DATE:  RESPONDING  PROVIDER #:        Alex Krishna MD          QUERY TEXT:    Pt admitted with Pancreatitis and has Moderate Malnutrition documented in   Dietician progress note. Please document if in agreement with the Dietician   assessment. The medical record reflects the following:  Risk Factors: GERD, Pancreatitis  Clinical Indicators: Per Nutrition progress note on  ' Moderate   Malnutrition in the context of chronic illness. Mild eight loss. Moderate body   fat/muscle mass loss. Unintentional weight loss of 20 lbs in the last 7   months.'  Acute pancreatitis with high suspicion of Pancreatic cancer. BMI 25  Treatment:  Ensure with meals, Monitoring Intake/Output    Thank you,  [email or web address]  office hours  m-f 7-3  Options provided:  -- Moderate Malnutrition  -- Moderate Protein calorie malnutrition  -- Other - I will add my own diagnosis  -- Disagree - Not applicable / Not valid  -- Disagree - Clinically unable to determine / Unknown  -- Refer to Clinical Documentation Reviewer    PROVIDER RESPONSE TEXT:    This patient has moderate protein calorie malnutrition.     Query created by: Aron Carrero on 2023 8:53 AM      Electronically signed by:  Alex Krishna MD 2023 9:11 AM

## 2023-07-24 NOTE — OP NOTE
Operative Note      Patient: Patience Clay  YOB: 1965  MRN: [id number]    Date of Procedure: 7/24/2023    Pre-Op Diagnosis Codes:     * Pancreatic mass [K86.89]    Post-Op Diagnosis:  3 cm distal biliary stricture. Procedure(s):  EGD W/EUS FNA  ERCP ENDOSCOPIC RETROGRADE CHOLANGIOPANCREATOGRAPHY ( C-ARM)  EGD BIOPSY    Surgeon(s):  Malathi Jo MD    Assistant:   First Assistant: Polo Rucker RN    Anesthesia: Monitor Anesthesia Care    Estimated Blood Loss (mL): Minimal    Complications: None    Specimens:   ID Type Source Tests Collected by Time Destination   A : abnormal mucosa - duodenum bx Tissue Duodenum SURGICAL PATHOLOGY Malathi Jo MD 7/24/2023 1551    B : pancreatic head mass Tissue Pancreas CYTOLOGY, NON-GYN Malathi Jo MD 7/24/2023 1607        Implants:  Implant Name Type Inv. Item Serial No.  Lot No. LRB No. Used Action   STENT BILI RX 0.035 IN 8X60 MM 8.5 [id number] CM LP WALLFLEX - [id number]  STENT BILI RX 0.035 IN 8X60 MM 8.5 [id number] CM LP WALLFLEX  [id number]  1 Implanted         Drains:   [REMOVED] NG/OG/NJ/NE Tube Nasogastric 16 fr Right nostril (Removed)         Description of Procedure:  Prior to the procedure, a history and physical exam was performed and informed consent was obtained. The risks were discussed including pancreatitis, bleeding, and perforation. After the patient was placed in the prone position eved, the therapeutic duodenoscope scope was inserted into the mouth and advanced to the second portion of the duodenum allowing the papilla to be visualized. Using the a wire guided approach with the sphincterotome, the CBD was cannulated and a cholangiogram was performed. Findings: The initial cholangiogram revealed 3 cm distal high-grade stricture with upstream CBD CHD and intrahepatic ductal dilatation. A 6 mm sphincterotomy was performed.     The sphincterotome was exchanged, over a wire

## 2023-07-25 PROBLEM — K86.89 DILATION OF PANCREATIC DUCT: Status: ACTIVE | Noted: 2023-07-25

## 2023-07-25 PROBLEM — J96.01 ACUTE RESPIRATORY FAILURE WITH HYPOXIA (HCC): Status: ACTIVE | Noted: 2023-07-25

## 2023-07-25 LAB
ALBUMIN SERPL-MCNC: 3 G/DL (ref 3.5–5.2)
ALBUMIN/GLOB SERPL: 1.1 {RATIO} (ref 1–2.5)
ALP SERPL-CCNC: 283 U/L (ref 40–129)
ALT SERPL-CCNC: 56 U/L (ref 5–41)
ANION GAP SERPL CALCULATED.3IONS-SCNC: 10 MMOL/L (ref 9–17)
AST SERPL-CCNC: 25 U/L
BASOPHILS # BLD: 0 K/UL (ref 0–0.2)
BASOPHILS NFR BLD: 0 % (ref 0–2)
BILIRUB SERPL-MCNC: 1.1 MG/DL (ref 0.3–1.2)
BNP SERPL-MCNC: 1342 PG/ML
BUN SERPL-MCNC: 11 MG/DL (ref 6–20)
CALCIUM SERPL-MCNC: 9 MG/DL (ref 8.6–10.4)
CASE NUMBER:: NORMAL
CHLORIDE SERPL-SCNC: 111 MMOL/L (ref 98–107)
CO2 SERPL-SCNC: 20 MMOL/L (ref 20–31)
CREAT SERPL-MCNC: 0.6 MG/DL (ref 0.7–1.2)
CRP SERPL HS-MCNC: 153.2 MG/L (ref 0–5)
ECHO BSA: 1.94 M2
EOSINOPHIL # BLD: 0 K/UL (ref 0–0.44)
EOSINOPHILS RELATIVE PERCENT: 0 % (ref 1–4)
ERYTHROCYTE [DISTWIDTH] IN BLOOD BY AUTOMATED COUNT: 14.8 % (ref 11.8–14.4)
GFR SERPL CREATININE-BSD FRML MDRD: >60 ML/MIN/1.73M2
GLUCOSE SERPL-MCNC: 145 MG/DL (ref 70–99)
HCT VFR BLD AUTO: 32.6 % (ref 40.7–50.3)
HGB BLD-MCNC: 10.6 G/DL (ref 13–17)
IMM GRANULOCYTES # BLD AUTO: 0.11 K/UL (ref 0–0.3)
IMM GRANULOCYTES NFR BLD: 1 %
INR PPP: 1.1
LYMPHOCYTES NFR BLD: 1 K/UL (ref 1.1–3.7)
LYMPHOCYTES RELATIVE PERCENT: 9 % (ref 24–43)
MAGNESIUM SERPL-MCNC: 2.2 MG/DL (ref 1.6–2.6)
MCH RBC QN AUTO: 27.9 PG (ref 25.2–33.5)
MCHC RBC AUTO-ENTMCNC: 32.5 G/DL (ref 28.4–34.8)
MCV RBC AUTO: 85.8 FL (ref 82.6–102.9)
MICROORGANISM SPEC CULT: NORMAL
MONOCYTES NFR BLD: 0.56 K/UL (ref 0.1–1.2)
MONOCYTES NFR BLD: 5 % (ref 3–12)
MORPHOLOGY: ABNORMAL
MORPHOLOGY: ABNORMAL
NEUTROPHILS NFR BLD: 85 % (ref 36–65)
NEUTS SEG NFR BLD: 9.43 K/UL (ref 1.5–8.1)
NRBC BLD-RTO: 0 PER 100 WBC
PHOSPHATE SERPL-MCNC: 3.1 MG/DL (ref 2.5–4.5)
PLATELET # BLD AUTO: 299 K/UL (ref 138–453)
PMV BLD AUTO: 9.7 FL (ref 8.1–13.5)
POTASSIUM SERPL-SCNC: 5 MMOL/L (ref 3.7–5.3)
PROCALCITONIN SERPL-MCNC: 24.52 NG/ML
PROT SERPL-MCNC: 5.8 G/DL (ref 6.4–8.3)
PROTHROMBIN TIME: 14.4 SEC (ref 11.7–14.9)
RBC # BLD AUTO: 3.8 M/UL (ref 4.21–5.77)
SODIUM SERPL-SCNC: 141 MMOL/L (ref 135–144)
SPECIMEN DESCRIPTION: NORMAL
WBC OTHER # BLD: 11.1 K/UL (ref 3.5–11.3)

## 2023-07-25 PROCEDURE — 6360000002 HC RX W HCPCS: Performed by: INTERNAL MEDICINE

## 2023-07-25 PROCEDURE — 83690 ASSAY OF LIPASE: CPT

## 2023-07-25 PROCEDURE — 6360000002 HC RX W HCPCS: Performed by: STUDENT IN AN ORGANIZED HEALTH CARE EDUCATION/TRAINING PROGRAM

## 2023-07-25 PROCEDURE — 99232 SBSQ HOSP IP/OBS MODERATE 35: CPT | Performed by: INTERNAL MEDICINE

## 2023-07-25 PROCEDURE — C9113 INJ PANTOPRAZOLE SODIUM, VIA: HCPCS | Performed by: INTERNAL MEDICINE

## 2023-07-25 PROCEDURE — 6370000000 HC RX 637 (ALT 250 FOR IP): Performed by: INTERNAL MEDICINE

## 2023-07-25 PROCEDURE — 2700000000 HC OXYGEN THERAPY PER DAY

## 2023-07-25 PROCEDURE — 36415 COLL VENOUS BLD VENIPUNCTURE: CPT

## 2023-07-25 PROCEDURE — 97535 SELF CARE MNGMENT TRAINING: CPT

## 2023-07-25 PROCEDURE — APPSS45 APP SPLIT SHARED TIME 31-45 MINUTES: Performed by: NURSE PRACTITIONER

## 2023-07-25 PROCEDURE — 2580000003 HC RX 258: Performed by: INTERNAL MEDICINE

## 2023-07-25 PROCEDURE — 99231 SBSQ HOSP IP/OBS SF/LOW 25: CPT | Performed by: SURGERY

## 2023-07-25 PROCEDURE — 97165 OT EVAL LOW COMPLEX 30 MIN: CPT

## 2023-07-25 PROCEDURE — 84100 ASSAY OF PHOSPHORUS: CPT

## 2023-07-25 PROCEDURE — 85610 PROTHROMBIN TIME: CPT

## 2023-07-25 PROCEDURE — 87641 MR-STAPH DNA AMP PROBE: CPT

## 2023-07-25 PROCEDURE — 99232 SBSQ HOSP IP/OBS MODERATE 35: CPT | Performed by: STUDENT IN AN ORGANIZED HEALTH CARE EDUCATION/TRAINING PROGRAM

## 2023-07-25 PROCEDURE — 94761 N-INVAS EAR/PLS OXIMETRY MLT: CPT

## 2023-07-25 PROCEDURE — 2580000003 HC RX 258: Performed by: NURSE PRACTITIONER

## 2023-07-25 PROCEDURE — 94640 AIRWAY INHALATION TREATMENT: CPT

## 2023-07-25 PROCEDURE — 86140 C-REACTIVE PROTEIN: CPT

## 2023-07-25 PROCEDURE — 2060000000 HC ICU INTERMEDIATE R&B

## 2023-07-25 PROCEDURE — 83880 ASSAY OF NATRIURETIC PEPTIDE: CPT

## 2023-07-25 PROCEDURE — 85027 COMPLETE CBC AUTOMATED: CPT

## 2023-07-25 PROCEDURE — 84145 PROCALCITONIN (PCT): CPT

## 2023-07-25 PROCEDURE — 2580000003 HC RX 258: Performed by: STUDENT IN AN ORGANIZED HEALTH CARE EDUCATION/TRAINING PROGRAM

## 2023-07-25 PROCEDURE — 80053 COMPREHEN METABOLIC PANEL: CPT

## 2023-07-25 PROCEDURE — 83735 ASSAY OF MAGNESIUM: CPT

## 2023-07-25 PROCEDURE — 6370000000 HC RX 637 (ALT 250 FOR IP): Performed by: NURSE PRACTITIONER

## 2023-07-25 RX ADMIN — Medication 100 MG: at 08:05

## 2023-07-25 RX ADMIN — PANCRELIPASE LIPASE, PANCRELIPASE PROTEASE, PANCRELIPASE AMYLASE 40000 UNITS: 20000; 63000; 84000 CAPSULE, DELAYED RELEASE ORAL at 09:01

## 2023-07-25 RX ADMIN — MYCOPHENOLATE MOFETIL 300 MG: 500 TABLET ORAL at 20:53

## 2023-07-25 RX ADMIN — PIPERACILLIN AND TAZOBACTAM 3375 MG: 3; .375 INJECTION, POWDER, LYOPHILIZED, FOR SOLUTION INTRAVENOUS at 23:20

## 2023-07-25 RX ADMIN — MYCOPHENOLATE MOFETIL 300 MG: 500 TABLET ORAL at 14:27

## 2023-07-25 RX ADMIN — SODIUM CHLORIDE, PRESERVATIVE FREE 40 MG: 5 INJECTION INTRAVENOUS at 19:21

## 2023-07-25 RX ADMIN — METOCLOPRAMIDE HYDROCHLORIDE 10 MG: 5 INJECTION INTRAMUSCULAR; INTRAVENOUS at 06:33

## 2023-07-25 RX ADMIN — VANCOMYCIN HYDROCHLORIDE 1250 MG: 1.25 INJECTION, POWDER, LYOPHILIZED, FOR SOLUTION INTRAVENOUS at 23:19

## 2023-07-25 RX ADMIN — IPRATROPIUM BROMIDE 0.5 MG: 0.5 SOLUTION RESPIRATORY (INHALATION) at 19:35

## 2023-07-25 RX ADMIN — ENOXAPARIN SODIUM 40 MG: 100 INJECTION SUBCUTANEOUS at 09:00

## 2023-07-25 RX ADMIN — SODIUM CHLORIDE, POTASSIUM CHLORIDE, SODIUM LACTATE AND CALCIUM CHLORIDE: 600; 310; 30; 20 INJECTION, SOLUTION INTRAVENOUS at 10:48

## 2023-07-25 RX ADMIN — PIPERACILLIN AND TAZOBACTAM 3375 MG: 3; .375 INJECTION, POWDER, LYOPHILIZED, FOR SOLUTION INTRAVENOUS at 06:37

## 2023-07-25 RX ADMIN — METOCLOPRAMIDE HYDROCHLORIDE 10 MG: 5 INJECTION INTRAMUSCULAR; INTRAVENOUS at 17:09

## 2023-07-25 RX ADMIN — ACETAMINOPHEN 1000 MG: 500 TABLET ORAL at 04:01

## 2023-07-25 RX ADMIN — PIPERACILLIN AND TAZOBACTAM 3375 MG: 3; .375 INJECTION, POWDER, LYOPHILIZED, FOR SOLUTION INTRAVENOUS at 16:00

## 2023-07-25 RX ADMIN — PANCRELIPASE LIPASE, PANCRELIPASE PROTEASE, PANCRELIPASE AMYLASE 40000 UNITS: 20000; 63000; 84000 CAPSULE, DELAYED RELEASE ORAL at 17:09

## 2023-07-25 RX ADMIN — HYDROMORPHONE HYDROCHLORIDE 0.5 MG: 1 INJECTION, SOLUTION INTRAMUSCULAR; INTRAVENOUS; SUBCUTANEOUS at 19:21

## 2023-07-25 RX ADMIN — IPRATROPIUM BROMIDE 0.5 MG: 0.5 SOLUTION RESPIRATORY (INHALATION) at 07:25

## 2023-07-25 RX ADMIN — FOLIC ACID 1 MG: 1 TABLET ORAL at 08:05

## 2023-07-25 RX ADMIN — MYCOPHENOLATE MOFETIL 300 MG: 500 TABLET ORAL at 08:05

## 2023-07-25 RX ADMIN — METOCLOPRAMIDE HYDROCHLORIDE 10 MG: 5 INJECTION INTRAMUSCULAR; INTRAVENOUS at 10:48

## 2023-07-25 RX ADMIN — SODIUM CHLORIDE, PRESERVATIVE FREE 40 MG: 5 INJECTION INTRAVENOUS at 09:00

## 2023-07-25 RX ADMIN — PANCRELIPASE LIPASE, PANCRELIPASE PROTEASE, PANCRELIPASE AMYLASE 40000 UNITS: 20000; 63000; 84000 CAPSULE, DELAYED RELEASE ORAL at 11:39

## 2023-07-25 RX ADMIN — ACETAMINOPHEN 1000 MG: 500 TABLET ORAL at 10:48

## 2023-07-25 RX ADMIN — METOCLOPRAMIDE HYDROCHLORIDE 10 MG: 5 INJECTION INTRAMUSCULAR; INTRAVENOUS at 19:21

## 2023-07-25 RX ADMIN — HYDROMORPHONE HYDROCHLORIDE 0.5 MG: 1 INJECTION, SOLUTION INTRAMUSCULAR; INTRAVENOUS; SUBCUTANEOUS at 23:15

## 2023-07-25 RX ADMIN — VANCOMYCIN HYDROCHLORIDE 1500 MG: 10 INJECTION, POWDER, LYOPHILIZED, FOR SOLUTION INTRAVENOUS at 11:39

## 2023-07-25 RX ADMIN — IPRATROPIUM BROMIDE 0.5 MG: 0.5 SOLUTION RESPIRATORY (INHALATION) at 11:29

## 2023-07-25 RX ADMIN — IPRATROPIUM BROMIDE 0.5 MG: 0.5 SOLUTION RESPIRATORY (INHALATION) at 16:08

## 2023-07-25 ASSESSMENT — PAIN DESCRIPTION - LOCATION
LOCATION: ABDOMEN

## 2023-07-25 ASSESSMENT — PAIN DESCRIPTION - ORIENTATION
ORIENTATION: MID
ORIENTATION: LEFT;MID;LOWER
ORIENTATION: LEFT;LOWER;RIGHT

## 2023-07-25 ASSESSMENT — PAIN DESCRIPTION - DESCRIPTORS
DESCRIPTORS: ACHING
DESCRIPTORS: ACHING
DESCRIPTORS: ACHING;SHARP

## 2023-07-25 ASSESSMENT — PAIN SCALES - GENERAL
PAINLEVEL_OUTOF10: 4
PAINLEVEL_OUTOF10: 4
PAINLEVEL_OUTOF10: 5
PAINLEVEL_OUTOF10: 9
PAINLEVEL_OUTOF10: 8
PAINLEVEL_OUTOF10: 4

## 2023-07-25 ASSESSMENT — PAIN SCALES - WONG BAKER
WONGBAKER_NUMERICALRESPONSE: 6
WONGBAKER_NUMERICALRESPONSE: 4

## 2023-07-25 NOTE — PROGRESS NOTES
Occupational Therapy  Facility/Department: [address]  Occupational Therapy Initial Assessment    Name: Srikanth Gamboa  : 1965  MRN: [id number]  Date of Service: 2023    Discharge Recommendations:   No occupational therapy recommended at discharge        Patient Diagnosis(es): The primary encounter diagnosis was Tachycardia. Diagnoses of Acute pancreatitis, unspecified complication status, unspecified pancreatitis type, Chest pain, unspecified type, and Pancreatic mass were also pertinent to this visit. Past Medical History:  has a past medical history of Abdominal pain, COVID-19 vaccine series completed, Duodenitis, Elevated CEA, Fatty liver, GERD (gastroesophageal reflux disease), History of recent hospitalization, Retroperitoneal mass, and Weight loss. Past Surgical History:  has a past surgical history that includes Hand surgery; Urethra surgery; Esophagogastroduodenoscopy (2023); and ERCP (2023). Assessment   Performance deficits / Impairments: Decreased functional mobility ; Decreased ADL status; Decreased endurance;Decreased high-level IADLs  Assessment: pt limited by decreased endurance at this time impacting occupational performance and independence. pt would benefit from continued acute OT in order to increase safety and independence. Prognosis: Good  Decision Making: Low Complexity  REQUIRES OT FOLLOW-UP: Yes  Activity Tolerance  Activity Tolerance: Patient Tolerated treatment well        Plan   Occupational Therapy Plan  Times Per Week: 2-3 sessions     Restrictions  Restrictions/Precautions  Required Braces or Orthoses?: No  Position Activity Restriction  Other position/activity restrictions: up with assist, 2.5 L O2    Subjective   General  Patient assessed for rehabilitation services?: Yes  Family / Caregiver Present: No  Diagnosis: pancreatitis  General Comment  Comments: RN ok'd fortherapy this morning.  pt agreeable to participate in session and cooperative Intact    ADL  Feeding: Independent  Grooming: Modified independent   UE Bathing: Supervision  LE Bathing: Contact guard assistance  UE Dressing: Supervision  LE Dressing: Contact guard assistance  Toileting: Contact guard assistance  Additional Comments: OT facilitated pt in donning B socks, using restroom and brushing mouth while standing at bathroom. pt able to don B socks while sitting up in bed with supervision and increased time (pt expected to require CGA For standing part of LB dressing d/t impulsivity and decreased endurance). Pt able to use restroom while standing with CGA or safety. pt also brushed teeth while standing at sink with no difficulties, supervision provided for safety. Vision  Vision: Within Functional Limits  Hearing  Hearing: Within functional limits    Cognition  Overall Cognitive Status: Exceptions  Insights: Decreased awareness of deficits  Initiation: Requires cues for some  Cognition Comment: pt slightly impulsive during session requiring min cues for initiation and slowing down for safety.   Orientation  Overall Orientation Status: Within Functional Limits                    Education Given To: Patient  Education Provided: Role of Therapy;Plan of Care;Transfer Training  Education Provided Comments: benefits of being OOB  Education Method: Verbal  Barriers to Learning: None  Education Outcome: Verbalized understanding    LUE AROM (degrees)  LUE AROM : WFL  Left Hand AROM (degrees)  Left Hand AROM: WFL  RUE AROM (degrees)  RUE AROM : WFL  Right Hand AROM (degrees)  Right Hand AROM: WFL        Hand Dominance  Hand Dominance: Right                                                          AM-PAC Score        AM-PAC Inpatient Daily Activity Raw Score: 20 (07/25/23 1044)  AM-PAC Inpatient ADL T-Scale Score : 42.03 (07/25/23 1044)  ADL Inpatient CMS 0-100% Score: 38.32 (07/25/23 1044)  ADL Inpatient CMS G-Code Modifier : Loc Pedro (07/25/23 1044)           Goals  Short Term Goals  Time

## 2023-07-25 NOTE — CONSULTS
Today's Date: 7/24/2023  Patient Name: Katina White  Date of admission: 7/21/2023  1:53 AM  Patient's age: 66 y.o., 1965  Admission Dx: Pancreatitis, unspecified pancreatitis type [K85.90]  Acute pancreatitis, unspecified complication status, unspecified pancreatitis type [K85.90]    Reason for Consult: management recommendations  Requesting Physician: Quan Mccarty MD    Reason for the consult: Pancreatic mass    History Obtained From:  patient    Interval history  Abdominal pain  Pending EUS/ERCP today    HISTORY OF PRESENT ILLNESS:      The patient is a 66 y.o.  male who is admitted to the hospital for chest pain/abdominal pain and found to have pancreatitis he does have history of GERD schizophrenia and history of pancreatic mass with multiple admission recently and leaving [address] [place] [address] he presented back to the hospital with upper abdominal lower chest pain which has been midsternal, epigastric, nonradiating, sharp pain which changes in intensity throughout the day. No relieving factor. Patient states that he has been dealing with abdominal pain on and off for 9 months. He has lost around 20 pounds. Does not report any shortness of breath, no cough. No abnormal bowel movements. States that he has quit smoking and hardly drinks any alcohol. No did show LFTs trending up  There was a question about the PE subsegmental on the CT however CTA was done and no longer demonstrated the filling defect  There is consolidative type opacity on the right middle and right lower lobe consistent with progression pneumonitis      Past Medical History:   has a past medical history of Abdominal pain, COVID-19 vaccine series completed, Duodenitis, Elevated CEA, Fatty liver, GERD (gastroesophageal reflux disease), History of recent hospitalization, Retroperitoneal mass, and Weight loss.     Past Surgical History:   has a past surgical history that includes Hand surgery; Urethra surgery; This note is created with the assistance of a speech recognition program.  While intending to generate a document that actually reflects the content of the visit, the document can still have some errors including those of syntax and sound a like substitutions which may escape proof reading. It such instances, actual meaning can be extrapolated by contextual diversion.      Hematologist/Medical Oncologist

## 2023-07-25 NOTE — PLAN OF CARE
Problem: Respiratory - Adult  Goal: Achieves optimal ventilation and oxygenation  7/25/2023 0726 by Presley Greene RCP  Outcome: Progressing   BRONCHOSPASM/BRONCHOCONSTRICTION     [x]         IMPROVE AERATION/BREATH SOUNDS  [x]   ADMINISTER BRONCHODILATOR THERAPY AS APPROPRIATE  [x]   ASSESS BREATH SOUNDS  [x]   IMPLEMENT AEROSOL/MDI PROTOCOL  [x]   PATIENT EDUCATION AS NEEDED   PROVIDE ADEQUATE OXYGENATION WITH ACCEPTABLE SP02/ABG'S    [x]  IDENTIFY APPROPRIATE OXYGEN THERAPY  [x]   MONITOR SP02/ABG'S AS NEEDED   [x]   PATIENT EDUCATION AS NEEDED

## 2023-07-25 NOTE — ANESTHESIA POSTPROCEDURE EVALUATION
Department of Anesthesiology  Postprocedure Note    Patient: Hillary Zapata  MRN: [id number]  YOB: 1965  Date of evaluation: 7/25/2023      Procedure Summary     Date: 07/24/23 Room / Location: [address]    Anesthesia Start: [address] Anesthesia Stop: 1722    Procedures:       EGD W/EUS FNA      ERCP STENT INSERTION      EGD BIOPSY Diagnosis:       Pancreatic mass      (Pancreatic mass [K86.89])    Surgeons: Benny Hanna MD Responsible Provider: Linda Cain MD    Anesthesia Type: general, MAC ASA Status: 3          Anesthesia Type: No value filed.     Bravo Phase I: Bravo Score: 9    Bravo Phase II:        Anesthesia Post Evaluation    Patient location during evaluation: PACU  Patient participation: complete - patient participated  Level of consciousness: awake and alert  Airway patency: patent  Nausea & Vomiting: no nausea and no vomiting  Complications: no  Cardiovascular status: blood pressure returned to baseline  Respiratory status: acceptable  Hydration status: stable

## 2023-07-25 NOTE — CARE COORDINATION
Met with patient to discuss transitional planning. Plan is home independently. Patient will need cab ride home. Patient agreeable to plan.

## 2023-07-25 NOTE — CARE COORDINATION
07/25/23 1055   Readmission Assessment   Number of Days since last admission? 8-30 days   Previous Disposition Home Alone   Who is being Interviewed Patient   What was the patient's/caregiver's perception as to why they think they needed to return back to the hospital? [address] discharge on prior admission   Did you visit your Primary Care Physician after you left the hospital, before you returned this time? No   Why weren't you able to visit your PCP? Did not have an appointment   Did you see a specialist, such as Cardiac, Pulmonary, Orthopedic Physician, etc. after you left the hospital? No   Who advised the patient to return to the hospital? Self-referral   Does the patient report anything that got in the way of taking their medications? No   In our efforts to provide the best possible care to you and others like you, can you think of anything that we could have done to help you after you left the hospital the first time, so that you might not have needed to return so soon?  Other (Comment)  (nothing)

## 2023-07-25 NOTE — PROGRESS NOTES
Surgery update:  - pending EUS biopsy  - appreciate oncology recs   - tentative plan for outpatient diag lap and port placement Monday 7/31/2023 with Dr. Ellie Cavazos (no OR availability on Thursday)   - pt can be d/c as per primary team when clinically stable/ready and tolerating a diet with down-trending LFTs      KATYA Ward  Surgical Oncology/HPB [address] [place] [address]  Available through [facility]

## 2023-07-25 NOTE — PLAN OF CARE
BRONCHOSPASM/BRONCHOCONSTRICTION     [x]         IMPROVE AERATION/BREATH SOUNDS  [x]   ADMINISTER BRONCHODILATOR THERAPY AS APPROPRIATE  [x]   ASSESS BREATH SOUNDS  [x]   IMPLEMENT AEROSOL/MDI PROTOCOL  [x]   PATIENT EDUCATION AS NEEDED   PROVIDE ADEQUATE OXYGENATION WITH ACCEPTABLE SP02/ABG'S    [x]  IDENTIFY APPROPRIATE OXYGEN THERAPY  [x]   MONITOR SP02/ABG'S AS NEEDED   [x]   PATIENT EDUCATION AS NEEDED      Problem: Respiratory - Adult  Goal: Achieves optimal ventilation and oxygenation  7/25/2023 1939 by Vicki Weiss RCP  Outcome: Progressing

## 2023-07-25 NOTE — PROGRESS NOTES
102  --  111*   CO2  --   --  22  --  24  --  20   GLUCOSE  --   --  90  --  94  --  145*   BUN  --   --  10  --  8  --  11   CREATININE  --   --  0.6*  --  0.6*  --  0.6*   MG  --   --  2.0   < > 2.3 2.0 2.2   ANIONGAP  --   --  10  --  11  --  10   LABGLOM  --   --  >60  --  >60  --  >60   CALCIUM  --   --  9.0  --  9.5  --  9.0   CAION 1.12*  --   --   --   --   --   --    PHOS  --   --  3.4  --  3.7  --  3.1   PROBNP  --  438*  --   --  514*  --  1,342*    < > = values in this interval not displayed. Recent Labs     07/23/23  0643 07/24/23  0525 07/25/23  0340   PROT 6.4 6.5 5.8*   LABALBU 3.6 3.5 3.0*   AST 84* 42* 25   * 87* 56*   ALKPHOS 348* 342* 283*   BILITOT 2.8* 2.9* 1.1     ABG:No results found for: POCPH, PHART, PH, POCPCO2, ZEN4PFI, PCO2, POCPO2, PO2ART, PO2, POCHCO3, EYT9TUQ, HCO3, NBEA, PBEA, BEART, BE, THGBART, THB, PQZ6FGR, YQMW7GQY, S8SOSOLC, O2SAT, FIO2  Lab Results   Component Value Date/Time    SPECIAL RT [facility] [place] 9ML 07/21/2023 09:48 AM     Lab Results   Component Value Date/Time    CULTURE NO GROWTH 1 DAY 07/24/2023 07:08 AM       Radiology:  CT ABDOMEN PELVIS W WO CONTRAST Additional Contrast? None    Result Date: 7/23/2023  Redemonstration of infiltrative pancreatic head mass extending into the pancreaticoduodenal groove. No vascular encasement or metastatic deposits demonstrated on this exam.  Biliary obstruction has progressed since recent MRI. XR CHEST PORTABLE    Result Date: 7/21/2023  Normal examination. CT CHEST PULMONARY EMBOLISM W CONTRAST    Result Date: 7/23/2023  1. No evidence of pulmonary embolus. Prior questionable filling defect in the left lower lobe is not redemonstrated. 2. Interval development of consolidative type opacities, ground-glass in the right middle and right lower lobes consistent with interval progression of pneumonitis. No effusion or extrapleural air is noted. 3. Biliary ductal dilatation noted.   Known pancreatic mass is incompletely included in the field. CT CHEST PULMONARY EMBOLISM W CONTRAST    Result Date: 7/21/2023  1. Limited assessment of subsegmental pulmonary arteries due to respiratory motion. There is a questionable nonocclusive small filling defect versus artifact in the subsegmental pulmonary artery to the posterior left lower lobe. No central or segmental pulmonary embolus. 2.  Mild patchy opacities in the dependent lung bases, most likely atelectasis. Background moderate centrilobular emphysema. 3.  Limited assessment of upper abdominal structures due to phase of contrast and lack of fat. Biliary and pancreatic ductal dilatation is noted. Please refer to the separate CT abdomen and pelvis report for additional description. Physical Examination:        General appearance:  alert, cooperative and no distress  Mental Status:  oriented to person, place and time and normal affect  Lungs:  clear to auscultation bilaterally, normal effort  Heart:  regular rate and rhythm, no murmur  Abdomen:  soft, nontender, nondistended, bowel sounds present. Extremities:  no edema, redness, tenderness in the calves  Skin:  no gross lesions, rashes, induration    Assessment:        Hospital Problems             Last Modified POA    * (Principal) Pancreatitis, unspecified pancreatitis type 7/21/2023 Yes    GERD (gastroesophageal reflux disease) 7/21/2023 Yes    Weight loss 7/21/2023 Yes    Elevated CA 19-9 level 7/21/2023 Yes    Pancreatic mass 7/21/2023 Yes    Dilation of pancreatic duct 7/22/2023 Yes    Schizophrenia ([address]) 7/21/2023 Yes    Dilated bile duct 7/21/2023 Yes    Pneumonitis 7/23/2023 Yes    Sepsis ([address]) 7/24/2023 Yes    Tachycardia 7/24/2023 Yes     Plan:        Sepsis with acute respiratory failure. CT Chest concerning with ground glass opacities with pneumonitits. CRP continues to climb. Remains on Zosyn. Tachycardic improved with fluids. Blood cultures negative. Respiratory viral panel negative.  Possible ID consultation if no

## 2023-07-25 NOTE — PROGRESS NOTES
I saw and evaluated the patient, performing the key elements of the service. I discussed the findings, assessment and plan with the nurse practitioner/resident and agree with the their findings and plan as documented in the their note. EGD/EUS yesterday by Dr. Diana Blanchard yesterday. Mass in the head of the pancreas was identified measuring 40 x 41 mm with involvement of the portal vein, staged as T3N0. Biopsy was performed and pending. Preliminary cytology suspicious for malignancy. ERCP was performed and a partially covered metal stent was placed. Inflammatory markers are worsening  LFTs trending down, inflammatory markers trending up, continue antibiotics  Check lipase  FLD  Multimodality pain control  Continue PPI  We will await pathology, plan for diagnostic And port placement on Thursday  Appreciate Dr. Black Chappell from medical oncology    Time spent caring for patient 25 minutes    I spent greater than 50% of the visit coordinating care and answering all questions from patient, and family members, reviewing the possible outcome of the treatment. Reji Etienne MD [facility]  Surgical Oncology/HPB Surgery  [address] [place] [address] Surgical Specialists  [address] [place] [address]. [address]  [facility] [address]  Office:  [phone]  Fax:  [phone]  7/25/2023  8:40 AM

## 2023-07-25 NOTE — PROGRESS NOTES
Writer reached out to Dr. Elia Rhodes in regards to a diet order. Provider advised a clear liquid diet. New order placed.

## 2023-07-26 ENCOUNTER — APPOINTMENT (OUTPATIENT)
Age: 58
End: 2023-07-26
Attending: INTERNAL MEDICINE
Payer: MEDICARE

## 2023-07-26 ENCOUNTER — APPOINTMENT (OUTPATIENT)
Dept: GENERAL RADIOLOGY | Age: 58
End: 2023-07-26
Payer: MEDICARE

## 2023-07-26 LAB
ALBUMIN SERPL-MCNC: 3 G/DL (ref 3.5–5.2)
ALBUMIN/GLOB SERPL: 1 {RATIO} (ref 1–2.5)
ALP SERPL-CCNC: 229 U/L (ref 40–129)
ALT SERPL-CCNC: 44 U/L (ref 5–41)
ANION GAP SERPL CALCULATED.3IONS-SCNC: 11 MMOL/L (ref 9–17)
AST SERPL-CCNC: 18 U/L
BASOPHILS # BLD: <0.03 K/UL (ref 0–0.2)
BASOPHILS NFR BLD: 0 % (ref 0–2)
BILIRUB SERPL-MCNC: 0.8 MG/DL (ref 0.3–1.2)
BNP SERPL-MCNC: 1681 PG/ML
BUN SERPL-MCNC: 8 MG/DL (ref 6–20)
CALCIUM SERPL-MCNC: 9.3 MG/DL (ref 8.6–10.4)
CHLORIDE SERPL-SCNC: 105 MMOL/L (ref 98–107)
CO2 SERPL-SCNC: 24 MMOL/L (ref 20–31)
CREAT SERPL-MCNC: 0.7 MG/DL (ref 0.7–1.2)
CRP SERPL HS-MCNC: 92.4 MG/L (ref 0–5)
ECHO AO ROOT DIAM: 3.5 CM
ECHO AO ROOT INDEX: 1.9 CM/M2
ECHO AV AREA PEAK VELOCITY: 3.6 CM2
ECHO AV AREA VTI: 3.6 CM2
ECHO AV AREA/BSA PEAK VELOCITY: 2 CM2/M2
ECHO AV AREA/BSA VTI: 2 CM2/M2
ECHO AV MEAN GRADIENT: 2 MMHG
ECHO AV MEAN VELOCITY: 0.6 M/S
ECHO AV PEAK GRADIENT: 3 MMHG
ECHO AV PEAK VELOCITY: 0.9 M/S
ECHO AV VELOCITY RATIO: 0.89
ECHO AV VTI: 21.2 CM
ECHO BSA: 1.9 M2
ECHO IVC PROX: 2.5 CM
ECHO LA AREA 2C: 17.5 CM2
ECHO LA AREA 4C: 20.8 CM2
ECHO LA DIAMETER INDEX: 1.96 CM/M2
ECHO LA DIAMETER: 3.6 CM
ECHO LA MAJOR AXIS: 5.6 CM
ECHO LA MINOR AXIS: 5.5 CM
ECHO LA TO AORTIC ROOT RATIO: 1.03
ECHO LA VOL 2C: 44 ML (ref 18–58)
ECHO LA VOL 4C: 63 ML (ref 18–58)
ECHO LA VOL BP: 53 ML (ref 18–58)
ECHO LA VOL/BSA BIPLANE: 29 ML/M2 (ref 16–34)
ECHO LA VOLUME INDEX A2C: 24 ML/M2 (ref 16–34)
ECHO LA VOLUME INDEX A4C: 34 ML/M2 (ref 16–34)
ECHO LV E' LATERAL VELOCITY: 12 CM/S
ECHO LV E' SEPTAL VELOCITY: 10 CM/S
ECHO LV EDV A2C: 78 ML
ECHO LV EDV A4C: 51 ML
ECHO LV EDV INDEX A4C: 28 ML/M2
ECHO LV EDV NDEX A2C: 42 ML/M2
ECHO LV EJECTION FRACTION A2C: 54 %
ECHO LV EJECTION FRACTION A4C: 49 %
ECHO LV EJECTION FRACTION BIPLANE: 54 % (ref 55–100)
ECHO LV ESV A2C: 36 ML
ECHO LV ESV A4C: 26 ML
ECHO LV ESV INDEX A2C: 20 ML/M2
ECHO LV ESV INDEX A4C: 14 ML/M2
ECHO LV FRACTIONAL SHORTENING: 35 % (ref 28–44)
ECHO LV INTERNAL DIMENSION DIASTOLE INDEX: 2.66 CM/M2
ECHO LV INTERNAL DIMENSION DIASTOLIC: 4.9 CM (ref 4.2–5.9)
ECHO LV INTERNAL DIMENSION SYSTOLIC INDEX: 1.74 CM/M2
ECHO LV INTERNAL DIMENSION SYSTOLIC: 3.2 CM
ECHO LV IVSD: 0.6 CM (ref 0.6–1)
ECHO LV MASS 2D: 101 G (ref 88–224)
ECHO LV MASS INDEX 2D: 54.9 G/M2 (ref 49–115)
ECHO LV POSTERIOR WALL DIASTOLIC: 0.7 CM (ref 0.6–1)
ECHO LV RELATIVE WALL THICKNESS RATIO: 0.29
ECHO LVOT AREA: 4.2 CM2
ECHO LVOT AV VTI INDEX: 0.87
ECHO LVOT DIAM: 2.3 CM
ECHO LVOT MEAN GRADIENT: 1 MMHG
ECHO LVOT PEAK GRADIENT: 3 MMHG
ECHO LVOT PEAK VELOCITY: 0.8 M/S
ECHO LVOT STROKE VOLUME INDEX: 41.8 ML/M2
ECHO LVOT SV: 76.8 ML
ECHO LVOT VTI: 18.5 CM
ECHO MV A VELOCITY: 0.35 M/S
ECHO MV AREA VTI: 2.8 CM2
ECHO MV E DECELERATION TIME (DT): 169 MS
ECHO MV E VELOCITY: 0.68 M/S
ECHO MV E/A RATIO: 1.94
ECHO MV E/E' LATERAL: 5.67
ECHO MV E/E' RATIO (AVERAGED): 6.23
ECHO MV E/E' SEPTAL: 6.8
ECHO MV LVOT VTI INDEX: 1.5
ECHO MV MAX VELOCITY: 0.9 M/S
ECHO MV MEAN GRADIENT: 1 MMHG
ECHO MV MEAN VELOCITY: 0.5 M/S
ECHO MV PEAK GRADIENT: 3 MMHG
ECHO MV VTI: 27.8 CM
ECHO PV MAX VELOCITY: 0.8 M/S
ECHO PV PEAK GRADIENT: 3 MMHG
ECHO PVEIN A VELOCITY: 0.3 M/S
ECHO RV FREE WALL PEAK S': 12 CM/S
ECHO RV INTERNAL DIMENSION: 3.8 CM
ECHO RV TAPSE: 2.5 CM (ref 1.7–?)
EOSINOPHIL # BLD: 0.16 K/UL (ref 0–0.44)
EOSINOPHILS RELATIVE PERCENT: 2 % (ref 1–4)
ERYTHROCYTE [DISTWIDTH] IN BLOOD BY AUTOMATED COUNT: 15 % (ref 11.8–14.4)
GFR SERPL CREATININE-BSD FRML MDRD: >60 ML/MIN/1.73M2
GLUCOSE SERPL-MCNC: 101 MG/DL (ref 70–99)
HCT VFR BLD AUTO: 34.6 % (ref 40.7–50.3)
HGB BLD-MCNC: 11.1 G/DL (ref 13–17)
IMM GRANULOCYTES # BLD AUTO: <0.03 K/UL (ref 0–0.3)
IMM GRANULOCYTES NFR BLD: 0 %
INR PPP: 1
LIPASE SERPL-CCNC: 190 U/L (ref 13–60)
LYMPHOCYTES NFR BLD: 1.73 K/UL (ref 1.1–3.7)
LYMPHOCYTES RELATIVE PERCENT: 22 % (ref 24–43)
MAGNESIUM SERPL-MCNC: 1.9 MG/DL (ref 1.6–2.6)
MCH RBC QN AUTO: 28.6 PG (ref 25.2–33.5)
MCHC RBC AUTO-ENTMCNC: 32.1 G/DL (ref 28.4–34.8)
MCV RBC AUTO: 89.2 FL (ref 82.6–102.9)
MICROORGANISM SPEC CULT: NORMAL
MICROORGANISM SPEC CULT: NORMAL
MONOCYTES NFR BLD: 0.54 K/UL (ref 0.1–1.2)
MONOCYTES NFR BLD: 7 % (ref 3–12)
MRSA, DNA, NASAL: NEGATIVE
NEUTROPHILS NFR BLD: 69 % (ref 36–65)
NEUTS SEG NFR BLD: 5.26 K/UL (ref 1.5–8.1)
NRBC BLD-RTO: 0 PER 100 WBC
PHOSPHATE SERPL-MCNC: 2.6 MG/DL (ref 2.5–4.5)
PLATELET # BLD AUTO: 322 K/UL (ref 138–453)
PMV BLD AUTO: 10.4 FL (ref 8.1–13.5)
POTASSIUM SERPL-SCNC: 3.9 MMOL/L (ref 3.7–5.3)
PROCALCITONIN SERPL-MCNC: 15.93 NG/ML
PROT SERPL-MCNC: 5.9 G/DL (ref 6.4–8.3)
PROTHROMBIN TIME: 12.7 SEC (ref 11.7–14.9)
RBC # BLD AUTO: 3.88 M/UL (ref 4.21–5.77)
RBC # BLD: ABNORMAL 10*6/UL
SERVICE CMNT-IMP: NORMAL
SERVICE CMNT-IMP: NORMAL
SODIUM SERPL-SCNC: 140 MMOL/L (ref 135–144)
SPECIMEN DESCRIPTION: NORMAL
SURGICAL PATHOLOGY REPORT: NORMAL
WBC OTHER # BLD: 7.7 K/UL (ref 3.5–11.3)

## 2023-07-26 PROCEDURE — 6360000002 HC RX W HCPCS: Performed by: INTERNAL MEDICINE

## 2023-07-26 PROCEDURE — 2700000000 HC OXYGEN THERAPY PER DAY

## 2023-07-26 PROCEDURE — APPSS45 APP SPLIT SHARED TIME 31-45 MINUTES: Performed by: NURSE PRACTITIONER

## 2023-07-26 PROCEDURE — 2580000003 HC RX 258: Performed by: INTERNAL MEDICINE

## 2023-07-26 PROCEDURE — 83880 ASSAY OF NATRIURETIC PEPTIDE: CPT

## 2023-07-26 PROCEDURE — 2060000000 HC ICU INTERMEDIATE R&B

## 2023-07-26 PROCEDURE — 6370000000 HC RX 637 (ALT 250 FOR IP): Performed by: INTERNAL MEDICINE

## 2023-07-26 PROCEDURE — 36415 COLL VENOUS BLD VENIPUNCTURE: CPT

## 2023-07-26 PROCEDURE — 99232 SBSQ HOSP IP/OBS MODERATE 35: CPT | Performed by: STUDENT IN AN ORGANIZED HEALTH CARE EDUCATION/TRAINING PROGRAM

## 2023-07-26 PROCEDURE — 94640 AIRWAY INHALATION TREATMENT: CPT

## 2023-07-26 PROCEDURE — 99231 SBSQ HOSP IP/OBS SF/LOW 25: CPT | Performed by: SURGERY

## 2023-07-26 PROCEDURE — 84145 PROCALCITONIN (PCT): CPT

## 2023-07-26 PROCEDURE — 97161 PT EVAL LOW COMPLEX 20 MIN: CPT

## 2023-07-26 PROCEDURE — 83735 ASSAY OF MAGNESIUM: CPT

## 2023-07-26 PROCEDURE — 6360000002 HC RX W HCPCS: Performed by: STUDENT IN AN ORGANIZED HEALTH CARE EDUCATION/TRAINING PROGRAM

## 2023-07-26 PROCEDURE — 99232 SBSQ HOSP IP/OBS MODERATE 35: CPT | Performed by: INTERNAL MEDICINE

## 2023-07-26 PROCEDURE — 84100 ASSAY OF PHOSPHORUS: CPT

## 2023-07-26 PROCEDURE — 86140 C-REACTIVE PROTEIN: CPT

## 2023-07-26 PROCEDURE — 71045 X-RAY EXAM CHEST 1 VIEW: CPT

## 2023-07-26 PROCEDURE — 93306 TTE W/DOPPLER COMPLETE: CPT | Performed by: INTERNAL MEDICINE

## 2023-07-26 PROCEDURE — 80053 COMPREHEN METABOLIC PANEL: CPT

## 2023-07-26 PROCEDURE — 6360000002 HC RX W HCPCS: Performed by: NURSE PRACTITIONER

## 2023-07-26 PROCEDURE — 94761 N-INVAS EAR/PLS OXIMETRY MLT: CPT

## 2023-07-26 PROCEDURE — 6370000000 HC RX 637 (ALT 250 FOR IP): Performed by: NURSE PRACTITIONER

## 2023-07-26 PROCEDURE — 85610 PROTHROMBIN TIME: CPT

## 2023-07-26 PROCEDURE — 97116 GAIT TRAINING THERAPY: CPT

## 2023-07-26 PROCEDURE — 93306 TTE W/DOPPLER COMPLETE: CPT

## 2023-07-26 PROCEDURE — 85027 COMPLETE CBC AUTOMATED: CPT

## 2023-07-26 RX ORDER — PANTOPRAZOLE SODIUM 40 MG/1
40 TABLET, DELAYED RELEASE ORAL
Status: DISCONTINUED | OUTPATIENT
Start: 2023-07-26 | End: 2023-07-28 | Stop reason: HOSPADM

## 2023-07-26 RX ORDER — POTASSIUM CHLORIDE 7.45 MG/ML
10 INJECTION INTRAVENOUS
Status: COMPLETED | OUTPATIENT
Start: 2023-07-26 | End: 2023-07-26

## 2023-07-26 RX ORDER — MAGNESIUM SULFATE IN WATER 40 MG/ML
2000 INJECTION, SOLUTION INTRAVENOUS ONCE
Status: COMPLETED | OUTPATIENT
Start: 2023-07-26 | End: 2023-07-26

## 2023-07-26 RX ADMIN — Medication 100 MG: at 09:28

## 2023-07-26 RX ADMIN — MYCOPHENOLATE MOFETIL 300 MG: 500 TABLET ORAL at 09:28

## 2023-07-26 RX ADMIN — SODIUM CHLORIDE, PRESERVATIVE FREE 10 ML: 5 INJECTION INTRAVENOUS at 09:30

## 2023-07-26 RX ADMIN — PIPERACILLIN AND TAZOBACTAM 3375 MG: 3; .375 INJECTION, POWDER, LYOPHILIZED, FOR SOLUTION INTRAVENOUS at 16:21

## 2023-07-26 RX ADMIN — PANTOPRAZOLE SODIUM 40 MG: 40 TABLET, DELAYED RELEASE ORAL at 16:21

## 2023-07-26 RX ADMIN — HYDROMORPHONE HYDROCHLORIDE 0.5 MG: 1 INJECTION, SOLUTION INTRAMUSCULAR; INTRAVENOUS; SUBCUTANEOUS at 20:14

## 2023-07-26 RX ADMIN — POTASSIUM CHLORIDE 10 MEQ: 10 INJECTION, SOLUTION INTRAVENOUS at 12:46

## 2023-07-26 RX ADMIN — HYDROMORPHONE HYDROCHLORIDE 0.5 MG: 1 INJECTION, SOLUTION INTRAMUSCULAR; INTRAVENOUS; SUBCUTANEOUS at 23:12

## 2023-07-26 RX ADMIN — ENOXAPARIN SODIUM 40 MG: 100 INJECTION SUBCUTANEOUS at 09:28

## 2023-07-26 RX ADMIN — OXYCODONE HYDROCHLORIDE 10 MG: 5 TABLET ORAL at 00:47

## 2023-07-26 RX ADMIN — ACETAMINOPHEN 1000 MG: 500 TABLET ORAL at 11:28

## 2023-07-26 RX ADMIN — PIPERACILLIN AND TAZOBACTAM 3375 MG: 3; .375 INJECTION, POWDER, LYOPHILIZED, FOR SOLUTION INTRAVENOUS at 07:26

## 2023-07-26 RX ADMIN — POTASSIUM CHLORIDE 10 MEQ: 10 INJECTION, SOLUTION INTRAVENOUS at 12:00

## 2023-07-26 RX ADMIN — METOCLOPRAMIDE HYDROCHLORIDE 10 MG: 5 INJECTION INTRAMUSCULAR; INTRAVENOUS at 17:41

## 2023-07-26 RX ADMIN — METOCLOPRAMIDE HYDROCHLORIDE 10 MG: 5 INJECTION INTRAMUSCULAR; INTRAVENOUS at 11:29

## 2023-07-26 RX ADMIN — SODIUM CHLORIDE, PRESERVATIVE FREE 10 ML: 5 INJECTION INTRAVENOUS at 20:11

## 2023-07-26 RX ADMIN — PANCRELIPASE LIPASE, PANCRELIPASE PROTEASE, PANCRELIPASE AMYLASE 40000 UNITS: 20000; 63000; 84000 CAPSULE, DELAYED RELEASE ORAL at 09:28

## 2023-07-26 RX ADMIN — PANCRELIPASE LIPASE, PANCRELIPASE PROTEASE, PANCRELIPASE AMYLASE 40000 UNITS: 20000; 63000; 84000 CAPSULE, DELAYED RELEASE ORAL at 11:28

## 2023-07-26 RX ADMIN — MYCOPHENOLATE MOFETIL 300 MG: 500 TABLET ORAL at 20:10

## 2023-07-26 RX ADMIN — MYCOPHENOLATE MOFETIL 300 MG: 500 TABLET ORAL at 14:33

## 2023-07-26 RX ADMIN — PANTOPRAZOLE SODIUM 40 MG: 40 TABLET, DELAYED RELEASE ORAL at 09:28

## 2023-07-26 RX ADMIN — METOCLOPRAMIDE HYDROCHLORIDE 10 MG: 5 INJECTION INTRAMUSCULAR; INTRAVENOUS at 05:00

## 2023-07-26 RX ADMIN — ACETAMINOPHEN 1000 MG: 500 TABLET ORAL at 20:10

## 2023-07-26 RX ADMIN — IPRATROPIUM BROMIDE 0.5 MG: 0.5 SOLUTION RESPIRATORY (INHALATION) at 09:23

## 2023-07-26 RX ADMIN — HYDROMORPHONE HYDROCHLORIDE 0.5 MG: 1 INJECTION, SOLUTION INTRAMUSCULAR; INTRAVENOUS; SUBCUTANEOUS at 05:00

## 2023-07-26 RX ADMIN — PANCRELIPASE LIPASE, PANCRELIPASE PROTEASE, PANCRELIPASE AMYLASE 40000 UNITS: 20000; 63000; 84000 CAPSULE, DELAYED RELEASE ORAL at 17:40

## 2023-07-26 RX ADMIN — METOCLOPRAMIDE HYDROCHLORIDE 10 MG: 5 INJECTION INTRAMUSCULAR; INTRAVENOUS at 20:11

## 2023-07-26 RX ADMIN — MAGNESIUM SULFATE HEPTAHYDRATE 2000 MG: 40 INJECTION, SOLUTION INTRAVENOUS at 09:34

## 2023-07-26 RX ADMIN — FOLIC ACID 1 MG: 1 TABLET ORAL at 09:28

## 2023-07-26 RX ADMIN — ACETAMINOPHEN 1000 MG: 500 TABLET ORAL at 04:57

## 2023-07-26 RX ADMIN — PIPERACILLIN AND TAZOBACTAM 3375 MG: 3; .375 INJECTION, POWDER, LYOPHILIZED, FOR SOLUTION INTRAVENOUS at 23:09

## 2023-07-26 ASSESSMENT — PAIN SCALES - GENERAL
PAINLEVEL_OUTOF10: 4
PAINLEVEL_OUTOF10: 5
PAINLEVEL_OUTOF10: 8
PAINLEVEL_OUTOF10: 6
PAINLEVEL_OUTOF10: 8

## 2023-07-26 ASSESSMENT — PAIN DESCRIPTION - LOCATION
LOCATION: ABDOMEN

## 2023-07-26 ASSESSMENT — PAIN SCALES - WONG BAKER
WONGBAKER_NUMERICALRESPONSE: 6
WONGBAKER_NUMERICALRESPONSE: 4

## 2023-07-26 ASSESSMENT — PAIN DESCRIPTION - DESCRIPTORS: DESCRIPTORS: ACHING

## 2023-07-26 ASSESSMENT — PAIN DESCRIPTION - ORIENTATION: ORIENTATION: RIGHT;LEFT;LOWER

## 2023-07-26 NOTE — PLAN OF CARE
Problem: Discharge Planning  Goal: Discharge to home or other facility with appropriate resources  Outcome: Progressing     Problem: Pain  Goal: Verbalizes/displays adequate comfort level or baseline comfort level  7/26/2023 0033 by Kirk Herbert RN  Outcome: Progressing    Problem: Safety - Adult  Goal: Free from fall injury  7/26/2023 0033 by Kirk Herbert RN  Outcome: Progressing    Problem: Respiratory - Adult  Goal: Achieves optimal ventilation and oxygenation  7/26/2023 0033 by Kirk Herbert RN  Outcome: Progressing    Problem: ABCDS Injury Assessment  Goal: Absence of physical injury  7/26/2023 0033 by Kirk Herbert RN  Outcome: Progressing

## 2023-07-26 NOTE — PROGRESS NOTES
Pt offered shower/bath today and pt refused. Pt stated that he has taken a shower everyday and is not going to today. Pt will let writer know if he changes his mind.

## 2023-07-26 NOTE — PROGRESS NOTES
Physical Therapy  Facility/Department: [address] [facility]OWN  Physical Therapy Initial Assessment    Name: Paty Thrasher  : 1965  MRN: [id number]  Date of Service: 2023  Chief Complaint   Patient presents with    Chest Pain      Discharge Recommendations:  No therapy recommended at discharge   PT Equipment Recommendations  Equipment Needed: No      Patient Diagnosis(es): The primary encounter diagnosis was Tachycardia. Diagnoses of Acute pancreatitis, unspecified complication status, unspecified pancreatitis type, Chest pain, unspecified type, and Pancreatic mass were also pertinent to this visit. Past Medical History:  has a past medical history of Abdominal pain, COVID-19 vaccine series completed, Duodenitis, Elevated CEA, Fatty liver, GERD (gastroesophageal reflux disease), History of recent hospitalization, Retroperitoneal mass, and Weight loss. Past Surgical History:  has a past surgical history that includes Hand surgery; Urethra surgery; Esophagogastroduodenoscopy (2023); ERCP (2023); Upper gastrointestinal endoscopy (N/A, 2023); ERCP (N/A, 2023); and Upper gastrointestinal endoscopy (2023). Assessment   Assessment: Pt indepdent with bed mobility, transfers, ambulation 250 feet without an AD, and navigated 8 6\" stairs with one hand rail and no s/s of fatigue. Pt. in zero pain and confident with fucntional mobility in and out of hospital.  Pt is at baseline functional mobility without additional acute care PT needs. Pt is being discharged from PT at this time. PT should be reordered with a change in medical status. Pt is expected to be safe to return to prior living arrangements upon discharge based on today's session.   Therapy Prognosis: Good  Decision Making: Low Complexity  Requires PT Follow-Up: No  Activity Tolerance  Activity Tolerance: Patient tolerated evaluation without incident  Activity Tolerance Comments: Pt. completed evaluation with high confidence, and had Steps : 8  Stairs Height: 6\"  Rails: Right ascending  Device: No Device  Assistance: Supervision  Comment: Good control, recriprical step pattern, one railing used for support. No onset of symptoms or fatigue with movement. Balance  Posture: Fair  Sitting - Static: Good  Sitting - Dynamic: Good  Standing - Static: Good  Standing - Dynamic: Good                                                          AM-PAC Score  AM-PAC Inpatient Mobility Raw Score : 24 (07/26/23 1044)  AM-PAC Inpatient T-Scale Score : 61.14 (07/26/23 1044)  Mobility Inpatient CMS 0-100% Score: 0 (07/26/23 1044)  Mobility Inpatient CMS G-Code Modifier : [facility] (07/26/23 1044)              Goals  Short Term Goals  Time Frame for Short Term Goals: Pt is at baseline functional mobility without additional acute care PT needs. Pt is being discharged from PT at this time. PT should be reordered with a change in medical status. Education  Patient Education  Education Given To: Patient  Education Provided: Role of Therapy;Precautions  Education Provided Comments: Education that if syptoms return or if patient needs therapy for any reason to talk to the nurse to put in another order.   Education Method: Verbal  Barriers to Learning: None  Education Outcome: Demonstrated understanding      Therapy Time   Individual Concurrent Group Co-treatment   Time In 26 [id number]         Time Out 0910         Minutes 15         Timed Code Treatment Minutes: 8 Minutes       ASHLI Montoya  Evaluation performed under direct supervision of co-signing licensed therapist.

## 2023-07-26 NOTE — PLAN OF CARE
Problem: Discharge Planning  Goal: Discharge to home or other facility with appropriate resources  Outcome: Progressing     Problem: Pain  Goal: Verbalizes/displays adequate comfort level or baseline comfort level  Outcome: Progressing     Problem: Safety - Adult  Goal: Free from fall injury  Outcome: Progressing     Problem: Respiratory - Adult  Goal: Achieves optimal ventilation and oxygenation  7/26/2023 1827 by Tita Odnonell RN  Outcome: Progressing     Problem: ABCDS Injury Assessment  Goal: Absence of physical injury  Outcome: Progressing

## 2023-07-26 NOTE — PROGRESS NOTES
GLUCOSE 94  --  145* 101*   BUN 8  --  11 8   CREATININE 0.6*  --  0.6* 0.7   MG 2.3 2.0 2.2 1.9   ANIONGAP 11  --  10 11   LABGLOM >60  --  >60 >60   CALCIUM 9.5  --  9.0 9.3   PHOS 3.7  --  3.1 2.6   PROBNP 514*  --  1,342* 1,681*       Recent Labs     07/24/23  0525 07/25/23  0340 07/26/23  0316   PROT 6.5 5.8* 5.9*   LABALBU 3.5 3.0* 3.0*   AST 42* 25 18   ALT 87* 56* 44*   ALKPHOS 342* 283* 229*   BILITOT 2.9* 1.1 0.8   LIPASE  --  190*  --        ABG:No results found for: POCPH, PHART, PH, POCPCO2, UOU4WUR, PCO2, POCPO2, PO2ART, PO2, POCHCO3, YKE4PET, HCO3, NBEA, PBEA, BEART, BE, THGBART, THB, WER3QLP, PHSU9SHY, M1MNDVXB, O2SAT, FIO2  Lab Results   Component Value Date/Time    SPECIAL RT [facility] 9ML 07/21/2023 09:48 AM     Lab Results   Component Value Date/Time    CULTURE CLERICAL ERROR 07/25/2023 11:51 AM       Radiology:  CT ABDOMEN PELVIS W WO CONTRAST Additional Contrast? None    Result Date: 7/23/2023  Redemonstration of infiltrative pancreatic head mass extending into the pancreaticoduodenal groove. No vascular encasement or metastatic deposits demonstrated on this exam.  Biliary obstruction has progressed since recent MRI. XR CHEST PORTABLE    Result Date: 7/21/2023  Normal examination. CT CHEST PULMONARY EMBOLISM W CONTRAST    Result Date: 7/23/2023  1. No evidence of pulmonary embolus. Prior questionable filling defect in the left lower lobe is not redemonstrated. 2. Interval development of consolidative type opacities, ground-glass in the right middle and right lower lobes consistent with interval progression of pneumonitis. No effusion or extrapleural air is noted. 3. Biliary ductal dilatation noted. Known pancreatic mass is incompletely included in the field. CT CHEST PULMONARY EMBOLISM W CONTRAST    Result Date: 7/21/2023  1. Limited assessment of subsegmental pulmonary arteries due to respiratory motion.   There is a questionable nonocclusive small filling defect versus artifact in mass. Status post EUS 7/24/2023 demonstrated mass measuring 40 mm x 41 mm suspicious for malignancy. ERCP demonstrated 3 cm distal high-grade stricture with upstream CBD CHD and intrahepatic ductal dilatation which was stented. Preliminary cytology suspicious for malignancy. Plan for port placement outpatient. Pulmonary emboli, ruled out. Tobacco use disorder. Encourage cessation. DVT Prophylaxis: Lovenox. GI Prophylaxis: Protonix 40 mg IV BID. Discharge planning: Pending improvement in respiratory status and tolerating diet.      Reed Smart DO  7/26/2023  7:28 AM

## 2023-07-26 NOTE — PROGRESS NOTES
I saw and evaluated the patient, performing the key elements of the service. I discussed the findings, assessment and plan with the nurse practitioner/resident and agree with the their findings and plan as documented in the their note. EGD/EUS yesterday by Dr. Janice Pittman on 7/24. Mass in the head of the pancreas was identified measuring 40 x 41 mm with involvement of the portal vein, staged as T3N0. Biopsy was performed and pathology is pending. Preliminary cytology suspicious for malignancy. ERCP was performed and a partially covered metal stent was placed. Inflammatory markers are improving, he is feeling better  LFTs normalizing,   Continue antibiotics, he will leave on 2 weeks of abx  Low fat diet  Multimodality pain control  Continue PPI  We will await pathology, plan for diagnostic and port placement next week due to Heri Fragoso from medical oncology    Time spent caring for patient 25 minutes    I spent greater than 50% of the visit coordinating care and answering all questions from patient, and family members, reviewing the possible outcome of the treatment. Reji Etienne MD [facility]  Surgical Oncology/HPB Surgery  [facility]  [address]  [facility] [address]  Office:  [phone]  Fax:  [phone]  7/26/2023  8:04 AM

## 2023-07-26 NOTE — PROGRESS NOTES
and turgor, no rashes, no suspicious skin lesions noted ,    DATA:    Labs:   CBC:   Recent Labs     07/24/23  0525 07/25/23  0340   WBC 5.4 11.1   HGB 13.1 10.6*   HCT 40.7 32.6*    299       BMP:   Recent Labs     07/24/23  0525 07/24/23  2236 07/25/23  0340     --  141   K 4.3 4.7 5.0   CO2 24  --  20   BUN 8  --  11   CREATININE 0.6*  --  0.6*   LABGLOM >60  --  >60   GLUCOSE 94  --  145*       PT/INR:   Recent Labs     07/24/23  0525 07/25/23  0340   PROTIME 13.3 14.4   INR 1.0 1.1         IMAGING DATA:  US GI ENDOSCOPIC S&I   Final Result      FLUORO FOR SURGICAL PROCEDURES   Final Result      CT CHEST PULMONARY EMBOLISM W CONTRAST   Final Result   1. No evidence of pulmonary embolus. Prior questionable filling defect in the   left lower lobe is not redemonstrated. 2. Interval development of consolidative type opacities, ground-glass in the   right middle and right lower lobes consistent with interval progression of   pneumonitis. No effusion or extrapleural air is noted. 3. Biliary ductal dilatation noted. Known pancreatic mass is incompletely   included in the field. Vascular duplex lower extremity venous bilateral   Final Result      POC US FAST ABDOMEN LIMITED   Final Result      CT CHEST PULMONARY EMBOLISM W CONTRAST   Final Result   1. Limited assessment of subsegmental pulmonary arteries due to respiratory   motion. There is a questionable nonocclusive small filling defect versus   artifact in the subsegmental pulmonary artery to the posterior left lower   lobe. No central or segmental pulmonary embolus. 2.  Mild patchy opacities in the dependent lung bases, most likely   atelectasis. Background moderate centrilobular emphysema. 3.  Limited assessment of upper abdominal structures due to phase of contrast   and lack of fat. Biliary and pancreatic ductal dilatation is noted. Please   refer to the separate CT abdomen and pelvis report for additional description. CT ABDOMEN PELVIS W WO CONTRAST Additional Contrast? None   Final Result   Redemonstration of infiltrative pancreatic head mass extending into the   pancreaticoduodenal groove. No vascular encasement or metastatic deposits   demonstrated on this exam.  Biliary obstruction has progressed since recent   MRI. XR CHEST PORTABLE   Final Result   Normal examination. Primary Problem  Pancreatitis, unspecified pancreatitis type    Active Hospital Problems    Diagnosis Date Noted    Acute respiratory failure with hypoxia ([address]) [J96.01] 07/25/2023    Sepsis ([address]) [A41.9] 07/24/2023    Tachycardia [R00.0] 07/24/2023    Pneumonitis [J18.9] 07/23/2023    Pancreatitis, unspecified pancreatitis type [K85.90] 07/21/2023    Dilated bile duct [K83.8] 07/21/2023    Schizophrenia ([address]) [F20.9] 07/01/2023    Elevated CA 19-9 level [R97.8] 06/20/2023    Dilated pancreatic duct [K86.89] 06/20/2023    Pancreatic mass [K86.89] 06/20/2023    Weight loss [R63.4] 06/20/2023    GERD (gastroesophageal reflux disease) [K21.9] 05/05/2016         IMPRESSION:   Pancreatic head mass likely pancreatic cancer  Pancreatitis  Weight loss  Consultation on the right middle and right lower lobe likely pneumonitis  Obstructive type LFTs   Biliary duct dilation    RECOMMENDATIONS:  I reviewed the labs/imaging available to me,outside records and discussed with the patient. I explained to the patient the nature of this problem. I explained the significance of these abnormalities and possible etiology and management options   51-year-old man presented with abdominal pain pancreatitis and obstructive type LFTs with biliary duct dilation and CT of the abdomen/MRI compatible with pancreatic head mass with double duct sign with borderline high CA 19-9 highly suspicious for adenocarcinoma of the pancreas   EGD/EUS>Mass in the head of the pancreas 40 x 41 mm with involvement of the portal vein, staged as T3N0.     Biopsy was performed and

## 2023-07-27 LAB
ALBUMIN SERPL-MCNC: 3.1 G/DL (ref 3.5–5.2)
ALBUMIN/GLOB SERPL: 1 {RATIO} (ref 1–2.5)
ALP SERPL-CCNC: 212 U/L (ref 40–129)
ALT SERPL-CCNC: 35 U/L (ref 5–41)
ANION GAP SERPL CALCULATED.3IONS-SCNC: 12 MMOL/L (ref 9–17)
AST SERPL-CCNC: 14 U/L
BASOPHILS # BLD: <0.03 K/UL (ref 0–0.2)
BASOPHILS NFR BLD: 0 % (ref 0–2)
BILIRUB SERPL-MCNC: 0.6 MG/DL (ref 0.3–1.2)
BNP SERPL-MCNC: 1803 PG/ML
BUN SERPL-MCNC: 11 MG/DL (ref 6–20)
CALCIUM SERPL-MCNC: 9.3 MG/DL (ref 8.6–10.4)
CHLORIDE SERPL-SCNC: 107 MMOL/L (ref 98–107)
CO2 SERPL-SCNC: 23 MMOL/L (ref 20–31)
CREAT SERPL-MCNC: 0.8 MG/DL (ref 0.7–1.2)
CRP SERPL HS-MCNC: 46.1 MG/L (ref 0–5)
EOSINOPHIL # BLD: 0.36 K/UL (ref 0–0.44)
EOSINOPHILS RELATIVE PERCENT: 5 % (ref 1–4)
ERYTHROCYTE [DISTWIDTH] IN BLOOD BY AUTOMATED COUNT: 14.7 % (ref 11.8–14.4)
GFR SERPL CREATININE-BSD FRML MDRD: >60 ML/MIN/1.73M2
GLUCOSE SERPL-MCNC: 101 MG/DL (ref 70–99)
HCT VFR BLD AUTO: 35.4 % (ref 40.7–50.3)
HGB BLD-MCNC: 11.2 G/DL (ref 13–17)
IMM GRANULOCYTES # BLD AUTO: <0.03 K/UL (ref 0–0.3)
IMM GRANULOCYTES NFR BLD: 0 %
INR PPP: 0.9
L PNEUMO1 AG UR QL IA.RAPID: NEGATIVE
LYMPHOCYTES NFR BLD: 2.09 K/UL (ref 1.1–3.7)
LYMPHOCYTES RELATIVE PERCENT: 31 % (ref 24–43)
MAGNESIUM SERPL-MCNC: 2.1 MG/DL (ref 1.6–2.6)
MCH RBC QN AUTO: 27.9 PG (ref 25.2–33.5)
MCHC RBC AUTO-ENTMCNC: 31.6 G/DL (ref 28.4–34.8)
MCV RBC AUTO: 88.3 FL (ref 82.6–102.9)
MONOCYTES NFR BLD: 0.66 K/UL (ref 0.1–1.2)
MONOCYTES NFR BLD: 10 % (ref 3–12)
NEUTROPHILS NFR BLD: 54 % (ref 36–65)
NEUTS SEG NFR BLD: 3.64 K/UL (ref 1.5–8.1)
NRBC BLD-RTO: 0 PER 100 WBC
PHOSPHATE SERPL-MCNC: 4 MG/DL (ref 2.5–4.5)
PLATELET # BLD AUTO: 319 K/UL (ref 138–453)
PMV BLD AUTO: 9.6 FL (ref 8.1–13.5)
POTASSIUM SERPL-SCNC: 4.1 MMOL/L (ref 3.7–5.3)
PROCALCITONIN SERPL-MCNC: 8.17 NG/ML
PROT SERPL-MCNC: 6.1 G/DL (ref 6.4–8.3)
PROTHROMBIN TIME: 12.1 SEC (ref 11.7–14.9)
RBC # BLD AUTO: 4.01 M/UL (ref 4.21–5.77)
RBC # BLD: ABNORMAL 10*6/UL
SODIUM SERPL-SCNC: 142 MMOL/L (ref 135–144)
WBC OTHER # BLD: 6.8 K/UL (ref 3.5–11.3)

## 2023-07-27 PROCEDURE — 85027 COMPLETE CBC AUTOMATED: CPT

## 2023-07-27 PROCEDURE — 84145 PROCALCITONIN (PCT): CPT

## 2023-07-27 PROCEDURE — 6370000000 HC RX 637 (ALT 250 FOR IP): Performed by: STUDENT IN AN ORGANIZED HEALTH CARE EDUCATION/TRAINING PROGRAM

## 2023-07-27 PROCEDURE — APPSS45 APP SPLIT SHARED TIME 31-45 MINUTES: Performed by: NURSE PRACTITIONER

## 2023-07-27 PROCEDURE — 85610 PROTHROMBIN TIME: CPT

## 2023-07-27 PROCEDURE — 99232 SBSQ HOSP IP/OBS MODERATE 35: CPT | Performed by: STUDENT IN AN ORGANIZED HEALTH CARE EDUCATION/TRAINING PROGRAM

## 2023-07-27 PROCEDURE — 84100 ASSAY OF PHOSPHORUS: CPT

## 2023-07-27 PROCEDURE — 6360000002 HC RX W HCPCS: Performed by: INTERNAL MEDICINE

## 2023-07-27 PROCEDURE — 6370000000 HC RX 637 (ALT 250 FOR IP): Performed by: NURSE PRACTITIONER

## 2023-07-27 PROCEDURE — 87449 NOS EACH ORGANISM AG IA: CPT

## 2023-07-27 PROCEDURE — 6360000002 HC RX W HCPCS: Performed by: STUDENT IN AN ORGANIZED HEALTH CARE EDUCATION/TRAINING PROGRAM

## 2023-07-27 PROCEDURE — 6370000000 HC RX 637 (ALT 250 FOR IP): Performed by: INTERNAL MEDICINE

## 2023-07-27 PROCEDURE — 97530 THERAPEUTIC ACTIVITIES: CPT

## 2023-07-27 PROCEDURE — 86140 C-REACTIVE PROTEIN: CPT

## 2023-07-27 PROCEDURE — 83880 ASSAY OF NATRIURETIC PEPTIDE: CPT

## 2023-07-27 PROCEDURE — 99233 SBSQ HOSP IP/OBS HIGH 50: CPT | Performed by: INTERNAL MEDICINE

## 2023-07-27 PROCEDURE — 99231 SBSQ HOSP IP/OBS SF/LOW 25: CPT | Performed by: SURGERY

## 2023-07-27 PROCEDURE — 36415 COLL VENOUS BLD VENIPUNCTURE: CPT

## 2023-07-27 PROCEDURE — 99222 1ST HOSP IP/OBS MODERATE 55: CPT | Performed by: INTERNAL MEDICINE

## 2023-07-27 PROCEDURE — 80053 COMPREHEN METABOLIC PANEL: CPT

## 2023-07-27 PROCEDURE — 2580000003 HC RX 258: Performed by: INTERNAL MEDICINE

## 2023-07-27 PROCEDURE — 83735 ASSAY OF MAGNESIUM: CPT

## 2023-07-27 PROCEDURE — 2060000000 HC ICU INTERMEDIATE R&B

## 2023-07-27 RX ORDER — FUROSEMIDE 10 MG/ML
20 INJECTION INTRAMUSCULAR; INTRAVENOUS ONCE
Status: COMPLETED | OUTPATIENT
Start: 2023-07-27 | End: 2023-07-27

## 2023-07-27 RX ORDER — GUAIFENESIN 600 MG/1
600 TABLET, EXTENDED RELEASE ORAL 2 TIMES DAILY
Status: DISCONTINUED | OUTPATIENT
Start: 2023-07-27 | End: 2023-07-28 | Stop reason: HOSPADM

## 2023-07-27 RX ORDER — BENZONATATE 100 MG/1
100 CAPSULE ORAL 3 TIMES DAILY PRN
Status: DISCONTINUED | OUTPATIENT
Start: 2023-07-27 | End: 2023-07-28 | Stop reason: HOSPADM

## 2023-07-27 RX ADMIN — ENOXAPARIN SODIUM 40 MG: 100 INJECTION SUBCUTANEOUS at 09:12

## 2023-07-27 RX ADMIN — PANTOPRAZOLE SODIUM 40 MG: 40 TABLET, DELAYED RELEASE ORAL at 16:41

## 2023-07-27 RX ADMIN — GUAIFENESIN 600 MG: 600 TABLET, EXTENDED RELEASE ORAL at 21:47

## 2023-07-27 RX ADMIN — HYDROMORPHONE HYDROCHLORIDE 0.5 MG: 1 INJECTION, SOLUTION INTRAMUSCULAR; INTRAVENOUS; SUBCUTANEOUS at 12:51

## 2023-07-27 RX ADMIN — FOLIC ACID 1 MG: 1 TABLET ORAL at 08:02

## 2023-07-27 RX ADMIN — FUROSEMIDE 20 MG: 10 INJECTION, SOLUTION INTRAMUSCULAR; INTRAVENOUS at 07:53

## 2023-07-27 RX ADMIN — Medication 100 MG: at 08:02

## 2023-07-27 RX ADMIN — GUAIFENESIN 600 MG: 600 TABLET, EXTENDED RELEASE ORAL at 09:36

## 2023-07-27 RX ADMIN — METOCLOPRAMIDE HYDROCHLORIDE 10 MG: 5 INJECTION INTRAMUSCULAR; INTRAVENOUS at 21:48

## 2023-07-27 RX ADMIN — HYDROMORPHONE HYDROCHLORIDE 0.5 MG: 1 INJECTION, SOLUTION INTRAMUSCULAR; INTRAVENOUS; SUBCUTANEOUS at 04:11

## 2023-07-27 RX ADMIN — METOCLOPRAMIDE HYDROCHLORIDE 10 MG: 5 INJECTION INTRAMUSCULAR; INTRAVENOUS at 06:52

## 2023-07-27 RX ADMIN — PANCRELIPASE LIPASE, PANCRELIPASE PROTEASE, PANCRELIPASE AMYLASE 40000 UNITS: 20000; 63000; 84000 CAPSULE, DELAYED RELEASE ORAL at 12:28

## 2023-07-27 RX ADMIN — PANTOPRAZOLE SODIUM 40 MG: 40 TABLET, DELAYED RELEASE ORAL at 06:52

## 2023-07-27 RX ADMIN — PIPERACILLIN AND TAZOBACTAM 3375 MG: 3; .375 INJECTION, POWDER, LYOPHILIZED, FOR SOLUTION INTRAVENOUS at 23:59

## 2023-07-27 RX ADMIN — PIPERACILLIN AND TAZOBACTAM 3375 MG: 3; .375 INJECTION, POWDER, LYOPHILIZED, FOR SOLUTION INTRAVENOUS at 06:58

## 2023-07-27 RX ADMIN — ACETAMINOPHEN 1000 MG: 500 TABLET ORAL at 02:47

## 2023-07-27 RX ADMIN — MYCOPHENOLATE MOFETIL 300 MG: 500 TABLET ORAL at 09:11

## 2023-07-27 RX ADMIN — HYDROMORPHONE HYDROCHLORIDE 0.5 MG: 1 INJECTION, SOLUTION INTRAMUSCULAR; INTRAVENOUS; SUBCUTANEOUS at 16:33

## 2023-07-27 RX ADMIN — HYDROMORPHONE HYDROCHLORIDE 0.5 MG: 1 INJECTION, SOLUTION INTRAMUSCULAR; INTRAVENOUS; SUBCUTANEOUS at 09:03

## 2023-07-27 RX ADMIN — METOCLOPRAMIDE HYDROCHLORIDE 10 MG: 5 INJECTION INTRAMUSCULAR; INTRAVENOUS at 16:37

## 2023-07-27 RX ADMIN — PIPERACILLIN AND TAZOBACTAM 3375 MG: 3; .375 INJECTION, POWDER, LYOPHILIZED, FOR SOLUTION INTRAVENOUS at 15:30

## 2023-07-27 RX ADMIN — HYDROMORPHONE HYDROCHLORIDE 0.5 MG: 1 INJECTION, SOLUTION INTRAMUSCULAR; INTRAVENOUS; SUBCUTANEOUS at 19:53

## 2023-07-27 RX ADMIN — HYDROMORPHONE HYDROCHLORIDE 0.5 MG: 1 INJECTION, SOLUTION INTRAMUSCULAR; INTRAVENOUS; SUBCUTANEOUS at 22:53

## 2023-07-27 RX ADMIN — PANCRELIPASE LIPASE, PANCRELIPASE PROTEASE, PANCRELIPASE AMYLASE 40000 UNITS: 20000; 63000; 84000 CAPSULE, DELAYED RELEASE ORAL at 08:03

## 2023-07-27 RX ADMIN — MYCOPHENOLATE MOFETIL 300 MG: 500 TABLET ORAL at 21:47

## 2023-07-27 RX ADMIN — SODIUM CHLORIDE, PRESERVATIVE FREE 10 ML: 5 INJECTION INTRAVENOUS at 08:01

## 2023-07-27 RX ADMIN — MYCOPHENOLATE MOFETIL 300 MG: 500 TABLET ORAL at 13:50

## 2023-07-27 RX ADMIN — OXYCODONE HYDROCHLORIDE 10 MG: 5 TABLET ORAL at 06:52

## 2023-07-27 RX ADMIN — SODIUM CHLORIDE, PRESERVATIVE FREE 10 ML: 5 INJECTION INTRAVENOUS at 21:48

## 2023-07-27 RX ADMIN — METOCLOPRAMIDE HYDROCHLORIDE 10 MG: 5 INJECTION INTRAMUSCULAR; INTRAVENOUS at 11:26

## 2023-07-27 RX ADMIN — PANCRELIPASE LIPASE, PANCRELIPASE PROTEASE, PANCRELIPASE AMYLASE 40000 UNITS: 20000; 63000; 84000 CAPSULE, DELAYED RELEASE ORAL at 16:41

## 2023-07-27 RX ADMIN — OXYCODONE HYDROCHLORIDE 10 MG: 5 TABLET ORAL at 02:47

## 2023-07-27 ASSESSMENT — PAIN SCALES - GENERAL
PAINLEVEL_OUTOF10: 8
PAINLEVEL_OUTOF10: 7
PAINLEVEL_OUTOF10: 9
PAINLEVEL_OUTOF10: 9
PAINLEVEL_OUTOF10: 8
PAINLEVEL_OUTOF10: 9

## 2023-07-27 ASSESSMENT — PAIN DESCRIPTION - DESCRIPTORS
DESCRIPTORS: ACHING;DISCOMFORT
DESCRIPTORS: ACHING
DESCRIPTORS: ACHING
DESCRIPTORS: ACHING;DISCOMFORT
DESCRIPTORS: SHARP
DESCRIPTORS: SHARP
DESCRIPTORS: CRAMPING;HEAVINESS

## 2023-07-27 ASSESSMENT — ENCOUNTER SYMPTOMS
CHOKING: 0
COUGH: 1
SHORTNESS OF BREATH: 1

## 2023-07-27 ASSESSMENT — PAIN DESCRIPTION - LOCATION
LOCATION: ABDOMEN

## 2023-07-27 ASSESSMENT — PAIN DESCRIPTION - ORIENTATION
ORIENTATION: RIGHT;LEFT;LOWER
ORIENTATION: RIGHT;LEFT;LOWER

## 2023-07-27 ASSESSMENT — PAIN SCALES - WONG BAKER: WONGBAKER_NUMERICALRESPONSE: 8

## 2023-07-27 NOTE — PROGRESS NOTES
Today's Date: 7/26/2023  Patient Name: Yoon Potter  Date of admission: 7/21/2023  1:53 AM  Patient's age: 66 y.o., 1965  Admission Dx: Pancreatitis, unspecified pancreatitis type [K85.90]  Acute pancreatitis, unspecified complication status, unspecified pancreatitis type [K85.90]    Reason for Consult: management recommendations  Requesting Physician: No admitting provider for patient encounter. Reason for the consult: Pancreatic mass    History Obtained From:  patient    Interval history  Abdominal pain  EGD/EUS>Mass in the head of the pancreas 40 x 41 mm with involvement of the portal vein, staged as T3N0. Biopsy was performed and pending    HISTORY OF PRESENT ILLNESS:      The patient is a 66 y.o.  male who is admitted to the hospital for chest pain/abdominal pain and found to have pancreatitis he does have history of GERD schizophrenia and history of pancreatic mass with multiple admission recently and leaving [address] [place] [address] he presented back to the hospital with upper abdominal lower chest pain which has been midsternal, epigastric, nonradiating, sharp pain which changes in intensity throughout the day. No relieving factor. Patient states that he has been dealing with abdominal pain on and off for 9 months. He has lost around 20 pounds. Does not report any shortness of breath, no cough. No abnormal bowel movements. States that he has quit smoking and hardly drinks any alcohol.   No did show LFTs trending up  There was a question about the PE subsegmental on the CT however CTA was done and no longer demonstrated the filling defect  There is consolidative type opacity on the right middle and right lower lobe consistent with progression pneumonitis      Past Medical History:   has a past medical history of Abdominal pain, COVID-19 vaccine series completed, Duodenitis, Elevated CEA, Fatty liver, GERD (gastroesophageal reflux disease), History of recent hospitalization, the pancreas   EGD/EUS>Mass in the head of the pancreas 40 x 41 mm with involvement of the portal vein, staged as T3N0. Biopsy was performed and pending  As not surgical candidate option for neoadjuvant systemic treatment> port placement  Repeated CT of the chest no evidence of PE  Follow-up scan of the chest for developing pneumonitis  Will follow-up the patient after biopsy  Thank you for the consult        Discussed with patient and Nurse. Thank you for asking us to see this patient. [facility]                            This note is created with the assistance of a speech recognition program.  While intending to generate a document that actually reflects the content of the visit, the document can still have some errors including those of syntax and sound a like substitutions which may escape proof reading. It such instances, actual meaning can be extrapolated by contextual diversion.      Hematologist/Medical Oncologist

## 2023-07-27 NOTE — PLAN OF CARE
Problem: Discharge Planning  Goal: Discharge to home or other facility with appropriate resources  7/27/2023 0743 by Baljeet Cohn RN  Outcome: Progressing  7/26/2023 2257 by Paul Olivo RN  Outcome: Progressing  7/26/2023 1827 by Kyra Pelletier RN  Outcome: Progressing     Problem: Pain  Goal: Verbalizes/displays adequate comfort level or baseline comfort level  7/27/2023 0743 by Baljeet Cohn RN  Outcome: Progressing  7/26/2023 2257 by Paul Olivo RN  Outcome: Progressing  7/26/2023 1827 by Kyra Pelletier RN  Outcome: Progressing     Problem: Safety - Adult  Goal: Free from fall injury  7/27/2023 0743 by Baljeet Cohn RN  Outcome: Progressing  7/26/2023 2257 by Paul Olivo RN  Outcome: Progressing  7/26/2023 1827 by Kyra Pelletier RN  Outcome: Progressing     Problem: Respiratory - Adult  Goal: Achieves optimal ventilation and oxygenation  7/27/2023 0743 by Baljeet Cohn RN  Outcome: Progressing  7/26/2023 2257 by Paul Olivo RN  Outcome: Progressing  7/26/2023 1827 by Kyra Pelletier RN  Outcome: Progressing     Problem: ABCDS Injury Assessment  Goal: Absence of physical injury  7/27/2023 0743 by Baljeet Cohn RN  Outcome: Progressing  7/26/2023 2257 by Paul Olivo RN  Outcome: Progressing  7/26/2023 1827 by Kyra Pelletier RN  Outcome: Progressing

## 2023-07-27 NOTE — PROGRESS NOTES
Nutrition Assessment     Type and Reason for Visit: Reassess    Nutrition Recommendations/Plan:   Specify chocolate ONS TID  Encouraged use of ONS after discharge     Malnutrition Assessment:  Malnutrition Status: Moderate malnutrition    Nutrition Assessment:  Pt now on Low Fat diet with Ensure ONS TID with meals. Intakes have improved with % itatke the last 2 meals, though only 1-25% prior to this. Pt states he is eating \"OK\" though not as well as he thinks he should be as he just gets full very quickly. Pt does like and drink the Ensure, and prefers chocolate flavor. Encouraged use of ONS at home to prevent further weight loss. Weight is down somewhat since admission, which may reflect fluid status. Estimated Daily Nutrient Needs:  Energy (kcal):  6231-7425 kcal/day Weight Used for Energy Requirements: Current     Protein (g):  80-90 g/day Weight Used for Protein Requirements: Current        Fluid (ml/day):  1667-6818 ml/day Method Used for Fluid Requirements: 1 ml/kcal    Nutrition Related Findings:   Meds/Labs reviewed. Meds include ZenPep and Reglan. Wound Type: None    Current Nutrition Therapies:    ADULT DIET;  Regular; Low Fat (less than or equal to 50 gm/day)  ADULT ORAL NUTRITION SUPPLEMENT; Breakfast, Lunch, Dinner; Standard High Calorie/High Protein Oral Supplement    Anthropometric Measures:  Height: 5' 9\" (175.3 cm)  Current Body Wt: 156 lb 1.4 oz (70.8 kg)   BMI: 23    Nutrition Diagnosis:   Moderate malnutrition related to impaired nutrient utilization as evidenced by moderate loss of subcutaneous fat, moderate muscle loss    Nutrition Interventions:   Food and/or Nutrient Delivery: Continue Current Diet, Continue Oral Nutrition Supplement  Nutrition Education/Counseling: No recommendation at this time  Coordination of Nutrition Care: Continue to monitor while inpatient  Plan of Care discussed with: Patient    Goals:  Previous Goal Met: Progressing toward Goal(s)  Goals: Meet at least

## 2023-07-27 NOTE — PLAN OF CARE
Problem: Discharge Planning  Goal: Discharge to home or other facility with appropriate resources  7/26/2023 2257 by Ada Patton RN  Outcome: Progressing  7/26/2023 1827 by Sal Cody RN  Outcome: Progressing     Problem: Pain  Goal: Verbalizes/displays adequate comfort level or baseline comfort level  7/26/2023 2257 by Ada Patton RN  Outcome: Progressing  7/26/2023 1827 by Sal Cody RN  Outcome: Progressing     Problem: Safety - Adult  Goal: Free from fall injury  7/26/2023 2257 by Ada Patton RN  Outcome: Progressing  7/26/2023 1827 by Sal Cody RN  Outcome: Progressing     Problem: Respiratory - Adult  Goal: Achieves optimal ventilation and oxygenation  7/26/2023 2257 by Ada Patton RN  Outcome: Progressing  7/26/2023 1827 by Sal Cody RN  Outcome: Progressing  7/26/2023 1110 by Alonso Anaya RCP  Outcome: Progressing     Problem: ABCDS Injury Assessment  Goal: Absence of physical injury  7/26/2023 2257 by Ada Patton RN  Outcome: Progressing  7/26/2023 1827 by Sal Cody RN  Outcome: Progressing

## 2023-07-27 NOTE — PROGRESS NOTES
Occupational Therapy  Facility/Department: [address] STEPDOWN  Occupational Therapy Daily Treatment Note    Name: Paty Thrasher  : 1965  MRN: [id number]  Date of Service: 2023    Discharge Recommendations:  Patient would benefit from continued therapy after discharge          Patient Diagnosis(es): The primary encounter diagnosis was Tachycardia. Diagnoses of Acute pancreatitis, unspecified complication status, unspecified pancreatitis type, Chest pain, unspecified type, and Pancreatic mass were also pertinent to this visit. Past Medical History:  has a past medical history of Abdominal pain, COVID-19 vaccine series completed, Duodenitis, Elevated CEA, Fatty liver, GERD (gastroesophageal reflux disease), History of recent hospitalization, Retroperitoneal mass, and Weight loss. Past Surgical History:  has a past surgical history that includes Hand surgery; Urethra surgery; Esophagogastroduodenoscopy (2023); ERCP (2023); Upper gastrointestinal endoscopy (N/A, 2023); ERCP (N/A, 2023); and Upper gastrointestinal endoscopy (2023). Assessment   Performance deficits / Impairments: Decreased functional mobility ; Decreased ADL status; Decreased endurance;Decreased high-level IADLs  Assessment: pt would benefit from continued acute OT in order to increase safety and independence. Prognosis: Good  Decision Making: Low Complexity  REQUIRES OT FOLLOW-UP: Yes  Activity Tolerance  Activity Tolerance: Patient Tolerated treatment well        Plan   Occupational Therapy Plan  Times Per Week: 2-3 sessions     Restrictions  Restrictions/Precautions  Restrictions/Precautions: General Precautions, Up as Tolerated  Required Braces or Orthoses?: No  Position Activity Restriction  Other position/activity restrictions:  Independent, 3 L O2    Subjective   General  Patient assessed for rehabilitation services?: Yes  Response to previous treatment: Patient with no complaints from previous

## 2023-07-27 NOTE — CARE COORDINATION
Consult:pt does not have a working phone. Met with pt this date was awake and pleasant. Pt requested phone number to [employer] and information provided. Pt states he can get access to a phone. Provide pt with information to [employer] phone program.  Pt appreciative of information given.

## 2023-07-27 NOTE — PROGRESS NOTES
[facility]  Office: [address], DO, Janet Orozco, DO, Naila Webb, DO, Katherine Pierce Blood, DO, Patricia Fuentes MD, Patrick Aguero MD, Norman Lara MD, Sophia Flores MD,  Fitz Webber MD, Flip Saucedo MD, Giovanna Torres, DO, Jim Henriquez MD,  Saritha Vick MD, Nabil Posada MD, Thomas Veloz DO, Elodia Olmos MD,  Lety Jarquin, DO, Soni Munson MD, Nato Conrad MD, Jamila Koehler MD, Heather Dumont MD,  Lester Lara MD, Liane Francisco MD, Figueroa Rai DO, Elías Grider MD,  Sandra Jim MD, Quentin Dewitt, Flori Antonio, CNP, Rj Camacho, CNP, Aubrey Henson, CNP,  Bisi Laws, [facility], Yennifer Briseno, CNP, Rinku Cortes, CNP, Barrett Lorenzo, CNP, Malachi Jain, CNP, Rajiv Atkins, CNP, SHEFALI MorganC, Aide Schafer, [facility], Alexandr Bermudez, [facility], Neal Langston, 4 Kevyn Mcclain Rickey Devlin    Progress Note    7/27/2023    7:29 AM    Name:   Cisco Gomez  MRN:     [id number]     Acct:      [de-identified]   Room:   [address] [place] [address] Day:  6  Admit Date:  7/21/2023  1:53 AM    PCP:   No primary care provider on file. Code Status:  Full Code    Subjective:     C/C:   Chief Complaint   Patient presents with    Chest Pain     Interval History Status: improved. Vitals reviewed, afebrile and hemodynamically stable. Saturating well on 3 L nasal cannula. Labs reviewed, procalcitonin 8.17, CRP 46.1, elevated proBNP 1800, no leukocytosis. CXR 7/26/23 demonstrated multifocal pneumonia and pleural effusion. Lasix given. Overnight patient had no significant events. On examination patient resting comfortably in bed. No complaints at this time. Pulm consulted due to worsening CXR and persistent O2 requirements. Encourage incentive spirometer and acapella.      Brief History:     57-year-old male with known medical history of GERD, anemia, newly diagnosed pancreatic mass, suspicious for malignancy. Plan for port placement next week. Pulmonary emboli, ruled out. Tobacco use disorder. Encourage cessation. DVT Prophylaxis: Lovenox. GI Prophylaxis: Protonix 40 mg IV BID. Discharge planning: Pending improvement in respiratory status and tolerating diet. Pulm consulted.       Lety Jarquin DO  7/27/2023  7:29 AM

## 2023-07-27 NOTE — CONSULTS
PULMONARY & CRITICAL CARE MEDICINE CONSULT NOTE     Patient:  Gurdeep Delgadillo  MRN: [id number]  Admit date: 7/21/2023  Primary Care Physician: No primary care provider on file. Consulting Physician: Lilibeth Seo DO  CODE Status: Full Code  LOS: 6     SUBJECTIVE     CHIEF COMPLAINT/REASON FOR CONSULT: Pneumonitis     HISTORY OF PRESENT ILLNESS:  61-year-old male with known medical history of GERD, anemia, newly diagnosed pancreatic mass, concerning for ductal adenocarcinoma presents to the hospital with abdominal pain. Abdominal pain has been ongoing for 9 months. Patient has left AMA on multiple occasions last month. Long discussion with the patient regarding his diagnosis. Patient is willing to stay in the hospital.  Dr. Nano Zuniga consulted for concern for pancreatic adenocarcinoma. Plan for EUS, ERCP Monday. Patient had hypoxia 1 episode, chest pain, CT PE scan was done after D-dimer was elevated. Concern for filling defect on CT scan. Dopplers to rule out DVT. Repeat CT scan came back negative for PE however showed groundglass opacities and concern for pneumonitis.     PAST MEDICAL HISTORY:        Diagnosis Date    Abdominal pain 06/19/2023    COVID-19 vaccine series completed     Duodenitis 06/19/2023    Elevated CEA 06/19/2023    Fatty liver     GERD (gastroesophageal reflux disease) 05/05/2016    History of recent hospitalization 06/19/2023    til 6/20/2023    Retroperitoneal mass 06/19/2023    Weight loss 06/19/2023     PAST SURGICAL HISTORY:        Procedure Laterality Date    ERCP  07/24/2023    ERCP N/A 7/24/2023    ERCP STENT INSERTION performed by Darrion Sands MD at [address] [place] [address]  07/24/2023    EGD W/EUS FNA, EGD BIOPSY    HAND SURGERY      UPPER GASTROINTESTINAL ENDOSCOPY N/A 7/24/2023    EGD W/EUS FNA performed by Darrion Sands MD at [facility]  7/24/2023    EGD BIOPSY performed by Darrion Sands MD at [id number] [place], [place] [address]y 1

## 2023-07-27 NOTE — PROGRESS NOTES
Pt received 0.5 mg Dilaudid at 0411. Documented in [facility]  Did not scan due to epic downtime

## 2023-07-27 NOTE — CARE COORDINATION
Transitional planning note: plan is home independently. Patient will need cab ride home. CM will continue to monitor for home O2 needs patient is still requiring nasal cannula oxygen and did not previously wear oxygen at home.

## 2023-07-28 ENCOUNTER — TELEPHONE (OUTPATIENT)
Dept: PULMONOLOGY | Age: 58
End: 2023-07-28

## 2023-07-28 ENCOUNTER — TELEPHONE (OUTPATIENT)
Dept: ONCOLOGY | Age: 58
End: 2023-07-28

## 2023-07-28 VITALS
SYSTOLIC BLOOD PRESSURE: 134 MMHG | OXYGEN SATURATION: 99 % | HEIGHT: 69 IN | DIASTOLIC BLOOD PRESSURE: 89 MMHG | BODY MASS INDEX: 22.57 KG/M2 | WEIGHT: 152.4 LBS | HEART RATE: 77 BPM | TEMPERATURE: 97.3 F | RESPIRATION RATE: 16 BRPM

## 2023-07-28 DIAGNOSIS — R06.02 SHORTNESS OF BREATH: ICD-10-CM

## 2023-07-28 DIAGNOSIS — C25.9 PANCREATIC ADENOCARCINOMA (HCC): Primary | ICD-10-CM

## 2023-07-28 PROBLEM — R93.3 ABNORMAL FINDING ON GI TRACT IMAGING: Status: ACTIVE | Noted: 2023-07-28

## 2023-07-28 LAB
ALBUMIN SERPL-MCNC: 3.3 G/DL (ref 3.5–5.2)
ALBUMIN/GLOB SERPL: 1 {RATIO} (ref 1–2.5)
ALP SERPL-CCNC: 208 U/L (ref 40–129)
ALT SERPL-CCNC: 47 U/L (ref 5–41)
ANION GAP SERPL CALCULATED.3IONS-SCNC: 11 MMOL/L (ref 9–17)
AST SERPL-CCNC: 32 U/L
BASOPHILS # BLD: 0.03 K/UL (ref 0–0.2)
BASOPHILS NFR BLD: 1 % (ref 0–2)
BILIRUB SERPL-MCNC: 0.5 MG/DL (ref 0.3–1.2)
BNP SERPL-MCNC: 634 PG/ML
BUN SERPL-MCNC: 14 MG/DL (ref 6–20)
CALCIUM SERPL-MCNC: 9.5 MG/DL (ref 8.6–10.4)
CHLORIDE SERPL-SCNC: 100 MMOL/L (ref 98–107)
CO2 SERPL-SCNC: 23 MMOL/L (ref 20–31)
CREAT SERPL-MCNC: 0.6 MG/DL (ref 0.7–1.2)
CRP SERPL HS-MCNC: 27.6 MG/L (ref 0–5)
EOSINOPHIL # BLD: 0.23 K/UL (ref 0–0.44)
EOSINOPHILS RELATIVE PERCENT: 4 % (ref 1–4)
ERYTHROCYTE [DISTWIDTH] IN BLOOD BY AUTOMATED COUNT: 14.6 % (ref 11.8–14.4)
GFR SERPL CREATININE-BSD FRML MDRD: >60 ML/MIN/1.73M2
GLUCOSE SERPL-MCNC: 134 MG/DL (ref 70–99)
HCT VFR BLD AUTO: 37.2 % (ref 40.7–50.3)
HGB BLD-MCNC: 12.1 G/DL (ref 13–17)
IMM GRANULOCYTES # BLD AUTO: 0.03 K/UL (ref 0–0.3)
IMM GRANULOCYTES NFR BLD: 1 %
INR PPP: 1
LYMPHOCYTES NFR BLD: 2.08 K/UL (ref 1.1–3.7)
LYMPHOCYTES RELATIVE PERCENT: 34 % (ref 24–43)
MAGNESIUM SERPL-MCNC: 2 MG/DL (ref 1.6–2.6)
MCH RBC QN AUTO: 28.9 PG (ref 25.2–33.5)
MCHC RBC AUTO-ENTMCNC: 32.5 G/DL (ref 28.4–34.8)
MCV RBC AUTO: 89 FL (ref 82.6–102.9)
MONOCYTES NFR BLD: 0.71 K/UL (ref 0.1–1.2)
MONOCYTES NFR BLD: 12 % (ref 3–12)
NEUTROPHILS NFR BLD: 48 % (ref 36–65)
NEUTS SEG NFR BLD: 3.12 K/UL (ref 1.5–8.1)
NRBC BLD-RTO: 0 PER 100 WBC
PHOSPHATE SERPL-MCNC: 3.9 MG/DL (ref 2.5–4.5)
PLATELET # BLD AUTO: 365 K/UL (ref 138–453)
PMV BLD AUTO: 9.9 FL (ref 8.1–13.5)
POTASSIUM SERPL-SCNC: 4 MMOL/L (ref 3.7–5.3)
PROCALCITONIN SERPL-MCNC: 3.88 NG/ML
PROT SERPL-MCNC: 6.5 G/DL (ref 6.4–8.3)
PROTHROMBIN TIME: 12.5 SEC (ref 11.7–14.9)
RBC # BLD AUTO: 4.18 M/UL (ref 4.21–5.77)
RBC # BLD: ABNORMAL 10*6/UL
SODIUM SERPL-SCNC: 134 MMOL/L (ref 135–144)
SURGICAL PATHOLOGY REPORT: NORMAL
WBC OTHER # BLD: 6.2 K/UL (ref 3.5–11.3)

## 2023-07-28 PROCEDURE — 2580000003 HC RX 258: Performed by: INTERNAL MEDICINE

## 2023-07-28 PROCEDURE — 80053 COMPREHEN METABOLIC PANEL: CPT

## 2023-07-28 PROCEDURE — 99231 SBSQ HOSP IP/OBS SF/LOW 25: CPT | Performed by: SURGERY

## 2023-07-28 PROCEDURE — 36415 COLL VENOUS BLD VENIPUNCTURE: CPT

## 2023-07-28 PROCEDURE — 84100 ASSAY OF PHOSPHORUS: CPT

## 2023-07-28 PROCEDURE — 6360000002 HC RX W HCPCS: Performed by: INTERNAL MEDICINE

## 2023-07-28 PROCEDURE — 86140 C-REACTIVE PROTEIN: CPT

## 2023-07-28 PROCEDURE — APPSS45 APP SPLIT SHARED TIME 31-45 MINUTES: Performed by: NURSE PRACTITIONER

## 2023-07-28 PROCEDURE — 6370000000 HC RX 637 (ALT 250 FOR IP): Performed by: INTERNAL MEDICINE

## 2023-07-28 PROCEDURE — 85610 PROTHROMBIN TIME: CPT

## 2023-07-28 PROCEDURE — 83735 ASSAY OF MAGNESIUM: CPT

## 2023-07-28 PROCEDURE — 84145 PROCALCITONIN (PCT): CPT

## 2023-07-28 PROCEDURE — 85027 COMPLETE CBC AUTOMATED: CPT

## 2023-07-28 PROCEDURE — 99232 SBSQ HOSP IP/OBS MODERATE 35: CPT | Performed by: INTERNAL MEDICINE

## 2023-07-28 PROCEDURE — 99231 SBSQ HOSP IP/OBS SF/LOW 25: CPT | Performed by: STUDENT IN AN ORGANIZED HEALTH CARE EDUCATION/TRAINING PROGRAM

## 2023-07-28 PROCEDURE — 6370000000 HC RX 637 (ALT 250 FOR IP): Performed by: NURSE PRACTITIONER

## 2023-07-28 PROCEDURE — 6360000002 HC RX W HCPCS: Performed by: STUDENT IN AN ORGANIZED HEALTH CARE EDUCATION/TRAINING PROGRAM

## 2023-07-28 PROCEDURE — 6370000000 HC RX 637 (ALT 250 FOR IP): Performed by: STUDENT IN AN ORGANIZED HEALTH CARE EDUCATION/TRAINING PROGRAM

## 2023-07-28 PROCEDURE — 83880 ASSAY OF NATRIURETIC PEPTIDE: CPT

## 2023-07-28 RX ORDER — METOCLOPRAMIDE 10 MG/1
10 TABLET ORAL
Status: DISCONTINUED | OUTPATIENT
Start: 2023-07-28 | End: 2023-07-28 | Stop reason: HOSPADM

## 2023-07-28 RX ORDER — LANOLIN ALCOHOL/MO/W.PET/CERES
100 CREAM (GRAM) TOPICAL DAILY
Qty: 30 TABLET | Refills: 3 | Status: ON HOLD | OUTPATIENT
Start: 2023-07-28

## 2023-07-28 RX ORDER — FOLIC ACID 1 MG/1
1 TABLET ORAL DAILY
Qty: 30 TABLET | Refills: 3 | Status: ON HOLD | OUTPATIENT
Start: 2023-07-28

## 2023-07-28 RX ORDER — AMOXICILLIN AND CLAVULANATE POTASSIUM 875; 125 MG/1; MG/1
1 TABLET, FILM COATED ORAL 2 TIMES DAILY
Qty: 14 TABLET | Refills: 0 | Status: SHIPPED | OUTPATIENT
Start: 2023-07-28 | End: 2023-08-04

## 2023-07-28 RX ORDER — GUAIFENESIN 600 MG/1
600 TABLET, EXTENDED RELEASE ORAL 2 TIMES DAILY
Qty: 6 TABLET | Refills: 0 | Status: SHIPPED | OUTPATIENT
Start: 2023-07-28 | End: 2023-07-31

## 2023-07-28 RX ORDER — OXYCODONE HYDROCHLORIDE 5 MG/1
5 TABLET ORAL EVERY 8 HOURS PRN
Qty: 9 TABLET | Refills: 0 | Status: SHIPPED | OUTPATIENT
Start: 2023-07-28 | End: 2023-07-31

## 2023-07-28 RX ORDER — PANTOPRAZOLE SODIUM 40 MG/1
40 TABLET, DELAYED RELEASE ORAL
Qty: 30 TABLET | Refills: 3 | Status: ON HOLD | OUTPATIENT
Start: 2023-07-28

## 2023-07-28 RX ORDER — BENZONATATE 100 MG/1
100 CAPSULE ORAL 3 TIMES DAILY PRN
Qty: 21 CAPSULE | Refills: 0 | Status: SHIPPED | OUTPATIENT
Start: 2023-07-28 | End: 2023-08-04

## 2023-07-28 RX ADMIN — OXYCODONE HYDROCHLORIDE 5 MG: 5 TABLET ORAL at 07:41

## 2023-07-28 RX ADMIN — MYCOPHENOLATE MOFETIL 300 MG: 500 TABLET ORAL at 09:26

## 2023-07-28 RX ADMIN — Medication 100 MG: at 09:00

## 2023-07-28 RX ADMIN — ACETAMINOPHEN 1000 MG: 500 TABLET ORAL at 12:09

## 2023-07-28 RX ADMIN — HYDROMORPHONE HYDROCHLORIDE 0.5 MG: 1 INJECTION, SOLUTION INTRAMUSCULAR; INTRAVENOUS; SUBCUTANEOUS at 01:53

## 2023-07-28 RX ADMIN — PANCRELIPASE LIPASE, PANCRELIPASE PROTEASE, PANCRELIPASE AMYLASE 40000 UNITS: 20000; 63000; 84000 CAPSULE, DELAYED RELEASE ORAL at 07:40

## 2023-07-28 RX ADMIN — GUAIFENESIN 600 MG: 600 TABLET, EXTENDED RELEASE ORAL at 09:26

## 2023-07-28 RX ADMIN — PANCRELIPASE LIPASE, PANCRELIPASE PROTEASE, PANCRELIPASE AMYLASE 40000 UNITS: 20000; 63000; 84000 CAPSULE, DELAYED RELEASE ORAL at 12:09

## 2023-07-28 RX ADMIN — PANTOPRAZOLE SODIUM 40 MG: 40 TABLET, DELAYED RELEASE ORAL at 06:10

## 2023-07-28 RX ADMIN — FOLIC ACID 1 MG: 1 TABLET ORAL at 09:26

## 2023-07-28 RX ADMIN — ENOXAPARIN SODIUM 40 MG: 100 INJECTION SUBCUTANEOUS at 09:26

## 2023-07-28 RX ADMIN — METOCLOPRAMIDE 10 MG: 10 TABLET ORAL at 12:09

## 2023-07-28 RX ADMIN — HYDROMORPHONE HYDROCHLORIDE 0.5 MG: 1 INJECTION, SOLUTION INTRAMUSCULAR; INTRAVENOUS; SUBCUTANEOUS at 04:55

## 2023-07-28 RX ADMIN — SODIUM CHLORIDE, PRESERVATIVE FREE 10 ML: 5 INJECTION INTRAVENOUS at 09:27

## 2023-07-28 RX ADMIN — PIPERACILLIN AND TAZOBACTAM 3375 MG: 3; .375 INJECTION, POWDER, LYOPHILIZED, FOR SOLUTION INTRAVENOUS at 09:41

## 2023-07-28 ASSESSMENT — PAIN DESCRIPTION - DESCRIPTORS
DESCRIPTORS: ACHING;DISCOMFORT
DESCRIPTORS: ACHING;DISCOMFORT
DESCRIPTORS: ACHING;SORE;SHARP

## 2023-07-28 ASSESSMENT — PAIN DESCRIPTION - ORIENTATION
ORIENTATION: MID
ORIENTATION: RIGHT;LEFT;LOWER
ORIENTATION: RIGHT;LEFT;LOWER

## 2023-07-28 ASSESSMENT — PAIN DESCRIPTION - PAIN TYPE: TYPE: ACUTE PAIN

## 2023-07-28 ASSESSMENT — PAIN SCALES - GENERAL
PAINLEVEL_OUTOF10: 10
PAINLEVEL_OUTOF10: 10
PAINLEVEL_OUTOF10: 9

## 2023-07-28 ASSESSMENT — ENCOUNTER SYMPTOMS
COUGH: 0
CHOKING: 0
GASTROINTESTINAL NEGATIVE: 1
SHORTNESS OF BREATH: 0

## 2023-07-28 ASSESSMENT — PAIN DESCRIPTION - FREQUENCY: FREQUENCY: CONTINUOUS

## 2023-07-28 ASSESSMENT — PAIN DESCRIPTION - LOCATION
LOCATION: ABDOMEN

## 2023-07-28 ASSESSMENT — PAIN - FUNCTIONAL ASSESSMENT: PAIN_FUNCTIONAL_ASSESSMENT: ACTIVITIES ARE NOT PREVENTED

## 2023-07-28 NOTE — CARE COORDINATION
Met with pt after receiving a social work consult to assist with transportation for doctor appt. Pt states that he has Medicare and Medicaid. Completed a Medical Transportation form for transportation through [employer]. Pt states that he has a 44.00 copay on his medications. Called Kenrick in HELP and pt's Medicaid is Q[employer] so he likely will not qualify for transport. Referral made to Ayanna at the cancer center. Explained to pt that he will need to find transport to his first appt to [address] [facility] in [place] to give time for application to go through.

## 2023-07-28 NOTE — DISCHARGE INSTRUCTIONS
Follow up with your PCP in 3 days. Call for an appointment as soon as possible. - If patient has no PCP please provide list on DC. Follow-up with specialist as instructed. Call for an appointment as soon as possible. Medications as instructed. - Augmentin for aspiration pneumonia x 7 more days. Return to the emergency department immediately for any new or worsening concerns.

## 2023-07-28 NOTE — TELEPHONE ENCOUNTER
----- Message from Alana Gipson sent at 7/28/2023  1:08 PM EDT -----  Tim Rocha, please see message from Dr Katherin Galeazzi and call to schedule. Thank you.   ----- Message -----  From: Mj Damon MD  Sent: 7/28/2023   1:06 PM EDT  To: [facility] Respiratory Spec Clinical Staff    Please schedule follow up in clinic with me in 4 weeks .    Thank you   Mj Damon MD

## 2023-07-28 NOTE — PROGRESS NOTES
I saw and evaluated the patient, performing the key elements of the service. I discussed the findings, assessment and plan with the nurse practitioner/resident and agree with the their findings and plan as documented in the their note. EGD/EUS yesterday by Dr. Renita Burrell on 7/24. Mass in the head of the pancreas was identified measuring 40 x 41 mm with involvement of the portal vein, staged as T3N0. Biopsy was performed and pathology is still pending. Preliminary cytology suspicious for malignancy. ERCP was performed and a partially covered metal stent was placed. Inflammatory markers are improving, he is feeling better  LFT normalized  Continue antibiotics, he will leave on 2 weeks of abx  Low fat diet  Multimodality pain control  Continue PPI  We will await pathology, plan for diagnostic and port placement once pneumonia resolves, f/u in clinic next week  Appreciate Dr. Lovey Schilder from medical oncology    Time spent caring for patient 25 minutes    I spent greater than 50% of the visit coordinating care and answering all questions from patient, and family members, reviewing the possible outcome of the treatment. Reji Etienne MD [facility]  Surgical Oncology/HPB Surgery  [facility]  [address]  [facility] [address]  Office:  [phone]  Fax:  [phone]  7/28/2023  8:02 AM

## 2023-07-28 NOTE — TELEPHONE ENCOUNTER
received referral from ED . Patient needing JFS NET paperwork signed and faxed in.  will get paperwork signed by Oncologist and submitted.

## 2023-07-28 NOTE — PROGRESS NOTES
Today's Date: 7/27/2023  Patient Name: Bev Zaragoza  Date of admission: 7/21/2023  1:53 AM  Patient's age: 66 y.o., 1965  Admission Dx: Pancreatitis, unspecified pancreatitis type [K85.90]  Acute pancreatitis, unspecified complication status, unspecified pancreatitis type [K85.90]    Reason for Consult: management recommendations  Requesting Physician: No admitting provider for patient encounter. Reason for the consult: Pancreatic mass    History Obtained From:  patient    Interval history  Abdominal pain  EGD/EUS>Mass in the head of the pancreas 40 x 41 mm with involvement of the portal vein, staged as T3N0. Biopsy was performed and pending>Preliminary cytology suspicious for malignancy  CXR 7/26/23 demonstrated multifocal pneumonia and pleural effusion    HISTORY OF PRESENT ILLNESS:      The patient is a 66 y.o.  male who is admitted to the hospital for chest pain/abdominal pain and found to have pancreatitis he does have history of GERD schizophrenia and history of pancreatic mass with multiple admission recently and leaving [address] [place] [address] he presented back to the hospital with upper abdominal lower chest pain which has been midsternal, epigastric, nonradiating, sharp pain which changes in intensity throughout the day. No relieving factor. Patient states that he has been dealing with abdominal pain on and off for 9 months. He has lost around 20 pounds. Does not report any shortness of breath, no cough. No abnormal bowel movements. States that he has quit smoking and hardly drinks any alcohol.   No did show LFTs trending up  There was a question about the PE subsegmental on the CT however CTA was done and no longer demonstrated the filling defect  There is consolidative type opacity on the right middle and right lower lobe consistent with progression pneumonitis      Past Medical History:   has a past medical history of Abdominal pain, COVID-19 vaccine series completed,

## 2023-07-28 NOTE — PROGRESS NOTES
[facility]  Office: [address], DO, Dina Grantter, DO, Kam Patrick, DO, [facility] Blood, DO, Anika Chen MD, Riki Pradhan MD, Demarcus Grullon MD, Lissy Jose MD,  Gadiel Lazo MD, Gayle Patel MD, Marah Elder, DO, Danielito Nolan MD,  Courtney Vieira MD, Susana Mandujano MD, Maddi Valle DO, Yung Hernandez MD,  Yamile Seo, DO, Elisa Rosenthal MD, Hyun Nino MD, Thai River MD, Ju Brown MD,  Jake Allen MD, Duane Campanile, MD, Venkatesh Mancilla, DO, Tabby Dye MD,  Aide Roa MD, Lilian Ferguson, Justine Gallegos, CNP, Gbae Barraza, CNP, Bill Boucher, CNP,  Ghada Chicas, [facility], Richa Mariscal, CNP, Meño Ralph, CNP, Wendy Fix, CNP, Alyse Sharif, CNP, Dl Larsen, CNP, SHEFALI RamirezC, Vinay Molina, [facility], Abiel Sanchez, CNP, Xavier Justice, [address] [place]    Progress Note    7/28/2023    7:33 AM    Name:   Herlinda Pleitez  MRN:     [id number]     Acct:      [de-identified]   Room:   [address] [place] [address] Day:  7  Admit Date:  7/21/2023  1:53 AM    PCP:   No primary care provider on file. Code Status:  Full Code    Subjective:     C/C:   Chief Complaint   Patient presents with    Chest Pain     Interval History Status: improved. Vitals reviewed, afebrile and hemodynamically stable. Saturating well on 3 L nasal cannula. Labs reviewed, procalcitonin 3.88, CRP 27.6, no leukocytosis. CXR 7/26/23 demonstrated multifocal pneumonia and pleural effusion. Lasix given with improvement. Overnight patient had no significant events. On examination patient resting comfortably in bed. Evaluated by pulmonology due to CXR and continued O2 needs. Believe aspiration suggesting augmentin on DC. Surgical oncology okay with DC. Will send on Augmentin.     Brief History:     65-year-old male with known medical history of GERD, anemia, newly diagnosed PH, POCPCO2, TLQ3WSG, PCO2, POCPO2, PO2ART, PO2, POCHCO3, TVH6XVU, HCO3, NBEA, PBEA, BEART, BE, THGBART, THB, NQH0QUH, TPRS1YBB, F2RUVPFQ, O2SAT, FIO2  Lab Results   Component Value Date/Time    SPECIAL RT TOMMYTAR [facility] 9ML 07/21/2023 09:48 AM     Lab Results   Component Value Date/Time    CULTURE CLERICAL ERROR 07/25/2023 11:51 AM       Radiology:  CT ABDOMEN PELVIS W WO CONTRAST Additional Contrast? None    Result Date: 7/23/2023  Redemonstration of infiltrative pancreatic head mass extending into the pancreaticoduodenal groove. No vascular encasement or metastatic deposits demonstrated on this exam.  Biliary obstruction has progressed since recent MRI. XR CHEST PORTABLE    Result Date: 7/21/2023  Normal examination. CT CHEST PULMONARY EMBOLISM W CONTRAST    Result Date: 7/23/2023  1. No evidence of pulmonary embolus. Prior questionable filling defect in the left lower lobe is not redemonstrated. 2. Interval development of consolidative type opacities, ground-glass in the right middle and right lower lobes consistent with interval progression of pneumonitis. No effusion or extrapleural air is noted. 3. Biliary ductal dilatation noted. Known pancreatic mass is incompletely included in the field. CT CHEST PULMONARY EMBOLISM W CONTRAST    Result Date: 7/21/2023  1. Limited assessment of subsegmental pulmonary arteries due to respiratory motion. There is a questionable nonocclusive small filling defect versus artifact in the subsegmental pulmonary artery to the posterior left lower lobe. No central or segmental pulmonary embolus. 2.  Mild patchy opacities in the dependent lung bases, most likely atelectasis. Background moderate centrilobular emphysema. 3.  Limited assessment of upper abdominal structures due to phase of contrast and lack of fat. Biliary and pancreatic ductal dilatation is noted. Please refer to the separate CT abdomen and pelvis report for additional description.      Physical Examination:

## 2023-07-28 NOTE — TELEPHONE ENCOUNTER
Name: Daly Tapia  : 1965  MRN: [id number]    Oncology Navigation- Initial Note: (INPATIENT NAVIGATION REFERRAL)    Intake-  Contact Type: Telephone    Continuum of Care: Diagnosis/Active Treatment    Notes: Oncology nurse navigation referral placed per [facility] Dr. Donna MULLINS office. Upon review of chart noted pt currently admitted @ [address] [place] [address] & d/c planning home. Upon review of demographics no contact #'s noted. Attempted to contact Haily Perez, [address] [place] [address] care coordinator, VM left notified writer will be navigating oncology care, requested contact #'s for pt, & requested contact writer @ [phone]. Will continue to follow.     Electronically signed by Ryan Quiñones RN on 2023 at 1:41 PM

## 2023-07-28 NOTE — PROGRESS NOTES
PULMONARY & CRITICAL CARE MEDICINE PROGRESS NOTE     Patient:  Doreen Barnett  MRN: [id number]  Admit date: 7/21/2023  Primary Care Physician: No primary care provider on file. Consulting Physician: Robert Madison DO  CODE Status: Full Code  LOS: 7     SUBJECTIVE     CHIEF COMPLAINT/REASON FOR CONSULT: Pneumonitis     HISTORY OF PRESENT ILLNESS:  61-year-old male with known medical history of GERD, anemia, newly diagnosed pancreatic mass, concerning for ductal adenocarcinoma presents to the hospital with abdominal pain. Abdominal pain has been ongoing for 9 months. Patient has left AMA on multiple occasions last month. Long discussion with the patient regarding his diagnosis. Patient is willing to stay in the hospital.  Dr. Faisal Hendricks consulted for concern for pancreatic adenocarcinoma. Plan for EUS, ERCP Monday. Patient had hypoxia 1 episode, chest pain, CT PE scan was done after D-dimer was elevated. Concern for filling defect on CT scan. Dopplers to rule out DVT. Repeat CT scan came back negative for PE however showed groundglass opacities and concern for pneumonitis.     Overnight:  No acute events   On RA saturating     PAST MEDICAL HISTORY:        Diagnosis Date    Abdominal pain 06/19/2023    COVID-19 vaccine series completed     Duodenitis 06/19/2023    Elevated CEA 06/19/2023    Fatty liver     GERD (gastroesophageal reflux disease) 05/05/2016    History of recent hospitalization 06/19/2023    til 6/20/2023    Retroperitoneal mass 06/19/2023    Weight loss 06/19/2023     PAST SURGICAL HISTORY:        Procedure Laterality Date    ERCP  07/24/2023    ERCP N/A 7/24/2023    ERCP STENT INSERTION performed by Linda Guzman MD at [address] [place] [address]  07/24/2023    EGD W/EUS FNA, EGD BIOPSY    HAND SURGERY      UPPER GASTROINTESTINAL ENDOSCOPY N/A 7/24/2023    EGD W/EUS FNA performed by Linda Guzman MD at [facility]  7/24/2023    EGD BIOPSY

## 2023-07-28 NOTE — CARE COORDINATION
Received a call from [address] [place], [place] [address]. I was informed that the patient has a $44 copay as his Medicaid is not active. Informed the RN. CM is not able to provide any assistance as the patient has drug coverage.

## 2023-07-29 LAB
MICROORGANISM SPEC CULT: NORMAL
SERVICE CMNT-IMP: NORMAL
SPECIMEN DESCRIPTION: NORMAL

## 2023-07-31 ENCOUNTER — TELEPHONE (OUTPATIENT)
Dept: ONCOLOGY | Age: 58
End: 2023-07-31

## 2023-07-31 NOTE — TELEPHONE ENCOUNTER
faxed NET application to [employer]. Fax did not go through due to being busy.  sent multiple faxes and mailed paperwork to [employer].

## 2023-07-31 NOTE — TELEPHONE ENCOUNTER
Will send a letter to home address on file asking the pt to call for a follow-up appointment. No phone number on file.

## 2023-07-31 NOTE — TELEPHONE ENCOUNTER
Name: Angel Luis Ivy  : 1965  MRN: [id number]    Oncology Navigation- Initial Note: (NO CONTACT INFORMATION)     Intake-  Contact Type: Telephone    Continuum of Care: Diagnosis/Active Treatment    Notes: Upon review of chart noted pt dc'd home  & no contact information entered in chart. Spoke w/Sukhjinder, [facility] , updated on findings. Sukhjinder reviewed recent e-mail r/t request for transportation assistance. Per Kate Fernandez no contact # given. Sukhjinder noted pt scheduled for Dr. James Montanez f/u 8/3. Spoke w/Maryann, Dr. Mini Garrido nurse, updated on pt & requested obtain contact # @ f/u. Per Jeanne Ga pt declined to give information during hospital stay. Jeanne Ga stated will update Dr. James Montanez & attempt to obtain contact # @ 8/3 appt. Sukhjinder updated. Will continue to follow.     Electronically signed by Thong Castano RN on 2023 at 8:36 AM

## 2023-08-03 ENCOUNTER — TELEPHONE (OUTPATIENT)
Dept: ONCOLOGY | Age: 58
End: 2023-08-03

## 2023-08-03 ENCOUNTER — OFFICE VISIT (OUTPATIENT)
Dept: SURGERY | Age: 58
End: 2023-08-03

## 2023-08-03 ENCOUNTER — TELEPHONE (OUTPATIENT)
Dept: SURGERY | Age: 58
End: 2023-08-03

## 2023-08-03 VITALS
HEART RATE: 95 BPM | BODY MASS INDEX: 20.26 KG/M2 | HEIGHT: 69 IN | TEMPERATURE: 98.3 F | WEIGHT: 136.8 LBS | DIASTOLIC BLOOD PRESSURE: 64 MMHG | OXYGEN SATURATION: 99 % | SYSTOLIC BLOOD PRESSURE: 101 MMHG

## 2023-08-03 DIAGNOSIS — C25.0 MALIGNANT NEOPLASM OF HEAD OF PANCREAS (HCC): Primary | ICD-10-CM

## 2023-08-03 ASSESSMENT — ENCOUNTER SYMPTOMS
COUGH: 0
WHEEZING: 0
DIARRHEA: 0
CHEST TIGHTNESS: 0
CONSTIPATION: 0
ABDOMINAL DISTENTION: 0
NAUSEA: 0
SHORTNESS OF BREATH: 0
BLOOD IN STOOL: 1
ABDOMINAL PAIN: 1
BACK PAIN: 1
VOMITING: 0

## 2023-08-03 NOTE — PROGRESS NOTES
Review of Systems   Constitutional:  Positive for chills and unexpected weight change. Negative for fatigue and fever. Eyes:  Negative for visual disturbance. Respiratory:  Negative for cough, chest tightness, shortness of breath and wheezing. Cardiovascular:  Negative for chest pain, palpitations and leg swelling. Gastrointestinal:  Positive for abdominal pain and blood in stool. Negative for abdominal distention, constipation, diarrhea, nausea and vomiting. Genitourinary:  Negative for dysuria, hematuria and urgency. Musculoskeletal:  Positive for back pain. Skin:  Negative for rash. Neurological:  Positive for weakness. Negative for dizziness, seizures, syncope, speech difficulty, light-headedness, numbness and headaches. Hematological:  Negative for adenopathy. Does not bruise/bleed easily. Psychiatric/Behavioral:  The patient is not nervous/anxious.
extrapolated by contextual diversion.       CC: Vivian Tsang MD

## 2023-08-03 NOTE — TELEPHONE ENCOUNTER
Patient is scheduled for 8/9/23 @ 11:45 with Dr. Eboni Narayan and 8/11/23 @ 11:00 am with Genetics. Both appointments are in [address] [place] [address].

## 2023-08-04 NOTE — DISCHARGE INSTRUCTIONS
Pre-operative Instructions    Please arrive at the surgery center by 5:30 AM on 8/14/2023  (or as directed by your surgeon's office). See Directons to Surgery Center below. FASTING    NOTHING TO EAT OR DRINK AFTER MIDNIGHT the night prior to surgery (This includes gum, candy, mints, chewing tobacco, etc). (Follow bowel prep instructions if instructed by your surgeon.)                MEDICATIONS    What to STOP: ANY BLOOD THINNING MEDICATION(S) as directed by your surgeon or prescribing physician. FAILURE TO STOP CERTAIN MEDICATIONS MAY INTERFERE WITH YOUR SCHEDULED SURGERY. According to the medication list you provided today, PLEASE STOP:     2. What to CONTINUE leading up to your surgery:   Please take all your other daily medications except the medications listed above that you were instructed to hold. 3. What to [place] with SMALL SIP OF WATER: pantoprazole (Protonix)                       IF APPLICABLE:  -If you have been given a blood band, you must bring it with you the day of surgery, unclasped.  -Use routine inhalers and bring inhalers the day of surgery.   -Bring C-Pap/Bi-pap with you morning of surgery if planning on staying in the hospital overnight.  -Do not take diabetic medications on the day of surgery. OTHER IMPORTANT REMINDERS    1) You may be required to provide a urine sample upon your arrival to the pre-op area, so please take this into consideration. 2) If  NOT planning on staying in the hospital overnight : A. You will need an adult family member /friend to drive you home after your procedure. Taxi cabs or any form of public transportation ALONE is not acceptable.   -Your  must be 25years of age or older and able to sign off on your discharge instructions.     -It is preferable that the friend or family member stay at the hospital throughout your procedure.   Radha Shannon must remain with you once you have

## 2023-08-05 ENCOUNTER — APPOINTMENT (OUTPATIENT)
Dept: GENERAL RADIOLOGY | Age: 58
DRG: 326 | End: 2023-08-05
Payer: MEDICARE

## 2023-08-05 ENCOUNTER — APPOINTMENT (OUTPATIENT)
Dept: CT IMAGING | Age: 58
DRG: 326 | End: 2023-08-05
Payer: MEDICARE

## 2023-08-05 ENCOUNTER — HOSPITAL ENCOUNTER (INPATIENT)
Age: 58
LOS: 8 days | Discharge: HOME | DRG: 326 | End: 2023-08-14
Attending: EMERGENCY MEDICINE | Admitting: SURGERY
Payer: MEDICARE

## 2023-08-05 DIAGNOSIS — C25.9 PANCREATIC ADENOCARCINOMA (HCC): Primary | ICD-10-CM

## 2023-08-05 DIAGNOSIS — R07.89 OTHER CHEST PAIN: ICD-10-CM

## 2023-08-05 DIAGNOSIS — I26.93 SINGLE SUBSEGMENTAL PULMONARY EMBOLISM WITHOUT ACUTE COR PULMONALE (HCC): ICD-10-CM

## 2023-08-05 DIAGNOSIS — R10.9 POSTOPERATIVE ABDOMINAL PAIN: ICD-10-CM

## 2023-08-05 DIAGNOSIS — R06.02 SHORTNESS OF BREATH: ICD-10-CM

## 2023-08-05 DIAGNOSIS — G89.18 POSTOPERATIVE ABDOMINAL PAIN: ICD-10-CM

## 2023-08-05 DIAGNOSIS — C25.0 MALIGNANT NEOPLASM OF HEAD OF PANCREAS (HCC): ICD-10-CM

## 2023-08-05 DIAGNOSIS — K31.1 GASTRIC OUTLET OBSTRUCTION: ICD-10-CM

## 2023-08-05 LAB
ALBUMIN SERPL-MCNC: 3.6 G/DL (ref 3.5–5.2)
ALBUMIN/GLOB SERPL: 1.4 {RATIO} (ref 1–2.5)
ALP SERPL-CCNC: 143 U/L (ref 40–129)
ALT SERPL-CCNC: 32 U/L (ref 5–41)
ANION GAP SERPL CALCULATED.3IONS-SCNC: 12 MMOL/L (ref 9–17)
AST SERPL-CCNC: 17 U/L
BASOPHILS # BLD: 0.07 K/UL (ref 0–0.2)
BASOPHILS NFR BLD: 1 % (ref 0–2)
BILIRUB SERPL-MCNC: 0.3 MG/DL (ref 0.3–1.2)
BUN SERPL-MCNC: 10 MG/DL (ref 6–20)
CALCIUM SERPL-MCNC: 8.4 MG/DL (ref 8.6–10.4)
CHLORIDE SERPL-SCNC: 105 MMOL/L (ref 98–107)
CO2 SERPL-SCNC: 22 MMOL/L (ref 20–31)
CREAT SERPL-MCNC: 0.6 MG/DL (ref 0.7–1.2)
EOSINOPHIL # BLD: 0.17 K/UL (ref 0–0.44)
EOSINOPHILS RELATIVE PERCENT: 2 % (ref 1–4)
ERYTHROCYTE [DISTWIDTH] IN BLOOD BY AUTOMATED COUNT: 14.3 % (ref 11.8–14.4)
GFR SERPL CREATININE-BSD FRML MDRD: >60 ML/MIN/1.73M2
GLUCOSE SERPL-MCNC: 96 MG/DL (ref 70–99)
HCT VFR BLD AUTO: 37.4 % (ref 40.7–50.3)
HGB BLD-MCNC: 12.3 G/DL (ref 13–17)
IMM GRANULOCYTES # BLD AUTO: <0.03 K/UL (ref 0–0.3)
IMM GRANULOCYTES NFR BLD: 0 %
LYMPHOCYTES NFR BLD: 2.59 K/UL (ref 1.1–3.7)
LYMPHOCYTES RELATIVE PERCENT: 28 % (ref 24–43)
MCH RBC QN AUTO: 29.4 PG (ref 25.2–33.5)
MCHC RBC AUTO-ENTMCNC: 32.9 G/DL (ref 28.4–34.8)
MCV RBC AUTO: 89.3 FL (ref 82.6–102.9)
MONOCYTES NFR BLD: 0.83 K/UL (ref 0.1–1.2)
MONOCYTES NFR BLD: 9 % (ref 3–12)
NEUTROPHILS NFR BLD: 60 % (ref 36–65)
NEUTS SEG NFR BLD: 5.54 K/UL (ref 1.5–8.1)
NRBC BLD-RTO: 0 PER 100 WBC
PLATELET # BLD AUTO: ABNORMAL K/UL (ref 138–453)
PLATELET, FLUORESCENCE: 569 K/UL (ref 138–453)
PLATELETS.RETICULATED NFR BLD AUTO: 2.4 % (ref 1.1–10.3)
POTASSIUM SERPL-SCNC: 3.3 MMOL/L (ref 3.7–5.3)
PROT SERPL-MCNC: 6.2 G/DL (ref 6.4–8.3)
RBC # BLD AUTO: 4.19 M/UL (ref 4.21–5.77)
SODIUM SERPL-SCNC: 139 MMOL/L (ref 135–144)
TROPONIN I SERPL HS-MCNC: 7 NG/L (ref 0–22)
WBC OTHER # BLD: 9.2 K/UL (ref 3.5–11.3)

## 2023-08-05 PROCEDURE — 6360000002 HC RX W HCPCS: Performed by: STUDENT IN AN ORGANIZED HEALTH CARE EDUCATION/TRAINING PROGRAM

## 2023-08-05 PROCEDURE — 96374 THER/PROPH/DIAG INJ IV PUSH: CPT

## 2023-08-05 PROCEDURE — 6360000004 HC RX CONTRAST MEDICATION: Performed by: STUDENT IN AN ORGANIZED HEALTH CARE EDUCATION/TRAINING PROGRAM

## 2023-08-05 PROCEDURE — 93005 ELECTROCARDIOGRAM TRACING: CPT | Performed by: STUDENT IN AN ORGANIZED HEALTH CARE EDUCATION/TRAINING PROGRAM

## 2023-08-05 PROCEDURE — 85055 RETICULATED PLATELET ASSAY: CPT

## 2023-08-05 PROCEDURE — 74177 CT ABD & PELVIS W/CONTRAST: CPT

## 2023-08-05 PROCEDURE — 99285 EMERGENCY DEPT VISIT HI MDM: CPT

## 2023-08-05 PROCEDURE — 96376 TX/PRO/DX INJ SAME DRUG ADON: CPT

## 2023-08-05 PROCEDURE — 84484 ASSAY OF TROPONIN QUANT: CPT

## 2023-08-05 PROCEDURE — 85025 COMPLETE CBC W/AUTO DIFF WBC: CPT

## 2023-08-05 PROCEDURE — 80053 COMPREHEN METABOLIC PANEL: CPT

## 2023-08-05 PROCEDURE — 71045 X-RAY EXAM CHEST 1 VIEW: CPT

## 2023-08-05 PROCEDURE — 6370000000 HC RX 637 (ALT 250 FOR IP): Performed by: STUDENT IN AN ORGANIZED HEALTH CARE EDUCATION/TRAINING PROGRAM

## 2023-08-05 PROCEDURE — 71260 CT THORAX DX C+: CPT

## 2023-08-05 RX ORDER — MORPHINE SULFATE 4 MG/ML
4 INJECTION, SOLUTION INTRAMUSCULAR; INTRAVENOUS ONCE
Status: COMPLETED | OUTPATIENT
Start: 2023-08-05 | End: 2023-08-05

## 2023-08-05 RX ADMIN — MORPHINE SULFATE 4 MG: 4 INJECTION INTRAVENOUS at 22:10

## 2023-08-05 RX ADMIN — IOPAMIDOL 75 ML: 755 INJECTION, SOLUTION INTRAVENOUS at 23:26

## 2023-08-05 RX ADMIN — POTASSIUM BICARBONATE 40 MEQ: 782 TABLET, EFFERVESCENT ORAL at 23:15

## 2023-08-05 RX ADMIN — MORPHINE SULFATE 4 MG: 4 INJECTION INTRAVENOUS at 23:21

## 2023-08-05 ASSESSMENT — HEART SCORE: ECG: 1

## 2023-08-05 ASSESSMENT — PAIN SCALES - GENERAL: PAINLEVEL_OUTOF10: 10

## 2023-08-06 ENCOUNTER — APPOINTMENT (OUTPATIENT)
Dept: GENERAL RADIOLOGY | Age: 58
DRG: 326 | End: 2023-08-06
Payer: MEDICARE

## 2023-08-06 PROBLEM — I26.99 LEFT PULMONARY EMBOLUS (HCC): Status: ACTIVE | Noted: 2023-08-06

## 2023-08-06 PROBLEM — C25.0 CANCER OF HEAD OF PANCREAS (HCC): Status: ACTIVE | Noted: 2023-08-06

## 2023-08-06 PROBLEM — R42 DIZZINESS: Status: ACTIVE | Noted: 2023-08-06

## 2023-08-06 PROBLEM — E87.6 HYPOKALEMIA: Status: ACTIVE | Noted: 2023-08-06

## 2023-08-06 PROBLEM — R06.00 DYSPNEA: Status: ACTIVE | Noted: 2023-08-06

## 2023-08-06 PROBLEM — D64.9 ANEMIA: Status: ACTIVE | Noted: 2023-08-06

## 2023-08-06 PROBLEM — E43 SEVERE MALNUTRITION (HCC): Chronic | Status: ACTIVE | Noted: 2023-08-06

## 2023-08-06 PROBLEM — R07.9 CHEST PAIN: Status: ACTIVE | Noted: 2023-08-06

## 2023-08-06 PROBLEM — I26.93 SINGLE SUBSEGMENTAL PULMONARY EMBOLISM WITHOUT ACUTE COR PULMONALE (HCC): Status: ACTIVE | Noted: 2023-08-06

## 2023-08-06 PROBLEM — K31.1 GASTRIC OUTLET OBSTRUCTION: Status: ACTIVE | Noted: 2023-08-06

## 2023-08-06 LAB
ANION GAP SERPL CALCULATED.3IONS-SCNC: 12 MMOL/L (ref 9–17)
ANTI-XA UNFRAC HEPARIN: 0.19 IU/L
ANTI-XA UNFRAC HEPARIN: 0.35 IU/L
ANTI-XA UNFRAC HEPARIN: 0.59 IU/L
ANTI-XA UNFRAC HEPARIN: 0.7 IU/L
BUN SERPL-MCNC: 10 MG/DL (ref 6–20)
CALCIUM SERPL-MCNC: 9.4 MG/DL (ref 8.6–10.4)
CHLORIDE SERPL-SCNC: 103 MMOL/L (ref 98–107)
CO2 SERPL-SCNC: 21 MMOL/L (ref 20–31)
CREAT SERPL-MCNC: 0.7 MG/DL (ref 0.7–1.2)
CRP SERPL HS-MCNC: <3 MG/L (ref 0–5)
ERYTHROCYTE [DISTWIDTH] IN BLOOD BY AUTOMATED COUNT: 14.5 % (ref 11.8–14.4)
GFR SERPL CREATININE-BSD FRML MDRD: >60 ML/MIN/1.73M2
GLUCOSE SERPL-MCNC: 145 MG/DL (ref 70–99)
HCT VFR BLD AUTO: 43.5 % (ref 40.7–50.3)
HGB BLD-MCNC: 13.1 G/DL (ref 13–17)
INR PPP: 1.1
LIPASE SERPL-CCNC: 275 U/L (ref 13–60)
MCH RBC QN AUTO: 28.3 PG (ref 25.2–33.5)
MCHC RBC AUTO-ENTMCNC: 30.1 G/DL (ref 28.4–34.8)
MCV RBC AUTO: 94 FL (ref 82.6–102.9)
METER GLUCOSE: 100 MG/DL (ref 75–110)
METER GLUCOSE: 120 MG/DL (ref 75–110)
METER GLUCOSE: 91 MG/DL (ref 75–110)
NRBC BLD-RTO: 0 PER 100 WBC
PARTIAL THROMBOPLASTIN TIME: 110.1 SEC (ref 23–36.5)
PLATELET # BLD AUTO: 571 K/UL (ref 138–453)
PMV BLD AUTO: 8.7 FL (ref 8.1–13.5)
POTASSIUM SERPL-SCNC: 4.3 MMOL/L (ref 3.7–5.3)
PREALB SERPL-MCNC: 21.6 MG/DL (ref 20–40)
PROCALCITONIN SERPL-MCNC: 0.09 NG/ML
PROTHROMBIN TIME: 14.2 SEC (ref 11.7–14.9)
RBC # BLD AUTO: 4.63 M/UL (ref 4.21–5.77)
SODIUM SERPL-SCNC: 136 MMOL/L (ref 135–144)
TROPONIN I SERPL HS-MCNC: 10 NG/L (ref 0–22)
TROPONIN I SERPL HS-MCNC: 8 NG/L (ref 0–22)
TROPONIN I SERPL HS-MCNC: 9 NG/L (ref 0–22)
WBC OTHER # BLD: 12.5 K/UL (ref 3.5–11.3)

## 2023-08-06 PROCEDURE — 2580000003 HC RX 258: Performed by: NURSE PRACTITIONER

## 2023-08-06 PROCEDURE — 6360000002 HC RX W HCPCS: Performed by: NURSE PRACTITIONER

## 2023-08-06 PROCEDURE — 6360000002 HC RX W HCPCS: Performed by: INTERNAL MEDICINE

## 2023-08-06 PROCEDURE — 2500000003 HC RX 250 WO HCPCS: Performed by: SURGERY

## 2023-08-06 PROCEDURE — 85027 COMPLETE CBC AUTOMATED: CPT

## 2023-08-06 PROCEDURE — 2T015 HOSPITALIST 2ND TOUCH: CPT | Performed by: INTERNAL MEDICINE

## 2023-08-06 PROCEDURE — 74240 X-RAY XM UPR GI TRC 1CNTRST: CPT

## 2023-08-06 PROCEDURE — 36415 COLL VENOUS BLD VENIPUNCTURE: CPT

## 2023-08-06 PROCEDURE — 85730 THROMBOPLASTIN TIME PARTIAL: CPT

## 2023-08-06 PROCEDURE — 74018 RADEX ABDOMEN 1 VIEW: CPT

## 2023-08-06 PROCEDURE — 2580000003 HC RX 258: Performed by: SURGERY

## 2023-08-06 PROCEDURE — 02HV33Z INSERTION OF INFUSION DEVICE INTO SUPERIOR VENA CAVA, PERCUTANEOUS APPROACH: ICD-10-PCS | Performed by: SURGERY

## 2023-08-06 PROCEDURE — C9113 INJ PANTOPRAZOLE SODIUM, VIA: HCPCS | Performed by: INTERNAL MEDICINE

## 2023-08-06 PROCEDURE — 6360000002 HC RX W HCPCS: Performed by: SURGERY

## 2023-08-06 PROCEDURE — 2580000003 HC RX 258: Performed by: INTERNAL MEDICINE

## 2023-08-06 PROCEDURE — 82947 ASSAY GLUCOSE BLOOD QUANT: CPT

## 2023-08-06 PROCEDURE — 84134 ASSAY OF PREALBUMIN: CPT

## 2023-08-06 PROCEDURE — 99222 1ST HOSP IP/OBS MODERATE 55: CPT | Performed by: SURGERY

## 2023-08-06 PROCEDURE — 94761 N-INVAS EAR/PLS OXIMETRY MLT: CPT

## 2023-08-06 PROCEDURE — 2060000000 HC ICU INTERMEDIATE R&B

## 2023-08-06 PROCEDURE — 80048 BASIC METABOLIC PNL TOTAL CA: CPT

## 2023-08-06 PROCEDURE — 6360000002 HC RX W HCPCS

## 2023-08-06 PROCEDURE — 6370000000 HC RX 637 (ALT 250 FOR IP): Performed by: NURSE PRACTITIONER

## 2023-08-06 PROCEDURE — 6360000004 HC RX CONTRAST MEDICATION: Performed by: SURGERY

## 2023-08-06 PROCEDURE — 83690 ASSAY OF LIPASE: CPT

## 2023-08-06 PROCEDURE — 85520 HEPARIN ASSAY: CPT

## 2023-08-06 PROCEDURE — 86140 C-REACTIVE PROTEIN: CPT

## 2023-08-06 PROCEDURE — 99223 1ST HOSP IP/OBS HIGH 75: CPT | Performed by: INTERNAL MEDICINE

## 2023-08-06 PROCEDURE — 85610 PROTHROMBIN TIME: CPT

## 2023-08-06 PROCEDURE — 84484 ASSAY OF TROPONIN QUANT: CPT

## 2023-08-06 PROCEDURE — 99222 1ST HOSP IP/OBS MODERATE 55: CPT | Performed by: STUDENT IN AN ORGANIZED HEALTH CARE EDUCATION/TRAINING PROGRAM

## 2023-08-06 PROCEDURE — 84145 PROCALCITONIN (PCT): CPT

## 2023-08-06 PROCEDURE — 93005 ELECTROCARDIOGRAM TRACING: CPT | Performed by: NURSE PRACTITIONER

## 2023-08-06 RX ORDER — HEPARIN SODIUM 1000 [USP'U]/ML
80 INJECTION, SOLUTION INTRAVENOUS; SUBCUTANEOUS ONCE
Status: COMPLETED | OUTPATIENT
Start: 2023-08-06 | End: 2023-08-06

## 2023-08-06 RX ORDER — LIDOCAINE HYDROCHLORIDE 10 MG/ML
5 INJECTION, SOLUTION EPIDURAL; INFILTRATION; INTRACAUDAL; PERINEURAL ONCE
Status: DISCONTINUED | OUTPATIENT
Start: 2023-08-06 | End: 2023-08-14

## 2023-08-06 RX ORDER — HEPARIN SODIUM 1000 [USP'U]/ML
80 INJECTION, SOLUTION INTRAVENOUS; SUBCUTANEOUS PRN
Status: DISCONTINUED | OUTPATIENT
Start: 2023-08-06 | End: 2023-08-09

## 2023-08-06 RX ORDER — MAGNESIUM HYDROXIDE/ALUMINUM HYDROXICE/SIMETHICONE 120; 1200; 1200 MG/30ML; MG/30ML; MG/30ML
30 SUSPENSION ORAL ONCE
Status: COMPLETED | OUTPATIENT
Start: 2023-08-06 | End: 2023-08-06

## 2023-08-06 RX ORDER — HEPARIN SODIUM 10000 [USP'U]/100ML
5-30 INJECTION, SOLUTION INTRAVENOUS CONTINUOUS
Status: DISCONTINUED | OUTPATIENT
Start: 2023-08-06 | End: 2023-08-06

## 2023-08-06 RX ORDER — SODIUM CHLORIDE 9 MG/ML
25 INJECTION, SOLUTION INTRAVENOUS PRN
Status: DISCONTINUED | OUTPATIENT
Start: 2023-08-06 | End: 2023-08-14 | Stop reason: HOSPADM

## 2023-08-06 RX ORDER — SODIUM CHLORIDE 0.9 % (FLUSH) 0.9 %
5-40 SYRINGE (ML) INJECTION EVERY 12 HOURS SCHEDULED
Status: DISCONTINUED | OUTPATIENT
Start: 2023-08-06 | End: 2023-08-14 | Stop reason: HOSPADM

## 2023-08-06 RX ORDER — ACETAMINOPHEN 325 MG/1
650 TABLET ORAL EVERY 6 HOURS PRN
Status: DISCONTINUED | OUTPATIENT
Start: 2023-08-06 | End: 2023-08-09

## 2023-08-06 RX ORDER — HEPARIN SODIUM 1000 [USP'U]/ML
40 INJECTION, SOLUTION INTRAVENOUS; SUBCUTANEOUS PRN
Status: DISCONTINUED | OUTPATIENT
Start: 2023-08-06 | End: 2023-08-09

## 2023-08-06 RX ORDER — NITROGLYCERIN 0.4 MG/1
0.4 TABLET SUBLINGUAL EVERY 5 MIN PRN
Status: DISCONTINUED | OUTPATIENT
Start: 2023-08-06 | End: 2023-08-14 | Stop reason: HOSPADM

## 2023-08-06 RX ORDER — ACETAMINOPHEN 650 MG/1
650 SUPPOSITORY RECTAL EVERY 6 HOURS PRN
Status: DISCONTINUED | OUTPATIENT
Start: 2023-08-06 | End: 2023-08-09

## 2023-08-06 RX ORDER — POTASSIUM CHLORIDE 7.45 MG/ML
10 INJECTION INTRAVENOUS PRN
Status: DISCONTINUED | OUTPATIENT
Start: 2023-08-06 | End: 2023-08-14 | Stop reason: HOSPADM

## 2023-08-06 RX ORDER — HEPARIN SODIUM 10000 [USP'U]/100ML
5-30 INJECTION, SOLUTION INTRAVENOUS CONTINUOUS
Status: DISCONTINUED | OUTPATIENT
Start: 2023-08-06 | End: 2023-08-09

## 2023-08-06 RX ORDER — HEPARIN SODIUM 1000 [USP'U]/ML
80 INJECTION, SOLUTION INTRAVENOUS; SUBCUTANEOUS ONCE
Status: DISCONTINUED | OUTPATIENT
Start: 2023-08-06 | End: 2023-08-09

## 2023-08-06 RX ORDER — MAGNESIUM SULFATE 1 G/100ML
1000 INJECTION INTRAVENOUS PRN
Status: DISCONTINUED | OUTPATIENT
Start: 2023-08-06 | End: 2023-08-14 | Stop reason: HOSPADM

## 2023-08-06 RX ORDER — POTASSIUM CHLORIDE 20 MEQ/1
40 TABLET, EXTENDED RELEASE ORAL PRN
Status: DISCONTINUED | OUTPATIENT
Start: 2023-08-06 | End: 2023-08-14 | Stop reason: HOSPADM

## 2023-08-06 RX ORDER — ATORVASTATIN CALCIUM 40 MG/1
40 TABLET, FILM COATED ORAL NIGHTLY
Status: DISCONTINUED | OUTPATIENT
Start: 2023-08-06 | End: 2023-08-14

## 2023-08-06 RX ORDER — SODIUM CHLORIDE 9 MG/ML
INJECTION, SOLUTION INTRAVENOUS PRN
Status: DISCONTINUED | OUTPATIENT
Start: 2023-08-06 | End: 2023-08-14 | Stop reason: HOSPADM

## 2023-08-06 RX ORDER — ONDANSETRON 4 MG/1
4 TABLET, ORALLY DISINTEGRATING ORAL EVERY 8 HOURS PRN
Status: DISCONTINUED | OUTPATIENT
Start: 2023-08-06 | End: 2023-08-14

## 2023-08-06 RX ORDER — SODIUM CHLORIDE, SODIUM LACTATE, POTASSIUM CHLORIDE, CALCIUM CHLORIDE 600; 310; 30; 20 MG/100ML; MG/100ML; MG/100ML; MG/100ML
INJECTION, SOLUTION INTRAVENOUS CONTINUOUS
Status: DISCONTINUED | OUTPATIENT
Start: 2023-08-06 | End: 2023-08-09

## 2023-08-06 RX ORDER — ONDANSETRON 2 MG/ML
4 INJECTION INTRAMUSCULAR; INTRAVENOUS EVERY 6 HOURS PRN
Status: DISCONTINUED | OUTPATIENT
Start: 2023-08-06 | End: 2023-08-14

## 2023-08-06 RX ORDER — SODIUM CHLORIDE 0.9 % (FLUSH) 0.9 %
5-40 SYRINGE (ML) INJECTION PRN
Status: DISCONTINUED | OUTPATIENT
Start: 2023-08-06 | End: 2023-08-14 | Stop reason: HOSPADM

## 2023-08-06 RX ORDER — HEPARIN SODIUM 1000 [USP'U]/ML
80 INJECTION, SOLUTION INTRAVENOUS; SUBCUTANEOUS PRN
Status: DISCONTINUED | OUTPATIENT
Start: 2023-08-06 | End: 2023-08-06

## 2023-08-06 RX ORDER — SODIUM CHLORIDE 0.9 % (FLUSH) 0.9 %
10 SYRINGE (ML) INJECTION PRN
Status: DISCONTINUED | OUTPATIENT
Start: 2023-08-06 | End: 2023-08-14 | Stop reason: HOSPADM

## 2023-08-06 RX ORDER — HEPARIN SODIUM 1000 [USP'U]/ML
40 INJECTION, SOLUTION INTRAVENOUS; SUBCUTANEOUS PRN
Status: DISCONTINUED | OUTPATIENT
Start: 2023-08-06 | End: 2023-08-06

## 2023-08-06 RX ADMIN — SODIUM CHLORIDE, POTASSIUM CHLORIDE, SODIUM LACTATE AND CALCIUM CHLORIDE: 600; 310; 30; 20 INJECTION, SOLUTION INTRAVENOUS at 23:56

## 2023-08-06 RX ADMIN — HEPARIN SODIUM 20 UNITS/KG/HR: 10000 INJECTION, SOLUTION INTRAVENOUS at 23:49

## 2023-08-06 RX ADMIN — SODIUM CHLORIDE, PRESERVATIVE FREE 40 MG: 5 INJECTION INTRAVENOUS at 19:52

## 2023-08-06 RX ADMIN — SODIUM CHLORIDE, PRESERVATIVE FREE 10 ML: 5 INJECTION INTRAVENOUS at 11:54

## 2023-08-06 RX ADMIN — SODIUM CHLORIDE, PRESERVATIVE FREE 40 MG: 5 INJECTION INTRAVENOUS at 09:42

## 2023-08-06 RX ADMIN — HEPARIN SODIUM 4970 UNITS: 1000 INJECTION INTRAVENOUS; SUBCUTANEOUS at 02:05

## 2023-08-06 RX ADMIN — POTASSIUM CHLORIDE: 2 INJECTION, SOLUTION, CONCENTRATE INTRAVENOUS at 17:52

## 2023-08-06 RX ADMIN — SODIUM CHLORIDE, POTASSIUM CHLORIDE, SODIUM LACTATE AND CALCIUM CHLORIDE: 600; 310; 30; 20 INJECTION, SOLUTION INTRAVENOUS at 09:33

## 2023-08-06 RX ADMIN — HEPARIN SODIUM 18 UNITS/KG/HR: 10000 INJECTION, SOLUTION INTRAVENOUS at 02:04

## 2023-08-06 RX ADMIN — HYDROMORPHONE HYDROCHLORIDE 1 MG: 1 INJECTION, SOLUTION INTRAMUSCULAR; INTRAVENOUS; SUBCUTANEOUS at 15:54

## 2023-08-06 RX ADMIN — SODIUM CHLORIDE, PRESERVATIVE FREE 10 ML: 5 INJECTION INTRAVENOUS at 19:53

## 2023-08-06 RX ADMIN — DIATRIZOATE MEGLUMINE AND DIATRIZOATE SODIUM 120 ML: 660; 100 LIQUID ORAL; RECTAL at 10:55

## 2023-08-06 RX ADMIN — HYDROMORPHONE HYDROCHLORIDE 0.5 MG: 1 INJECTION, SOLUTION INTRAMUSCULAR; INTRAVENOUS; SUBCUTANEOUS at 11:54

## 2023-08-06 RX ADMIN — I.V. FAT EMULSION 100 ML: 20 EMULSION INTRAVENOUS at 17:52

## 2023-08-06 RX ADMIN — HYDROMORPHONE HYDROCHLORIDE 1 MG: 1 INJECTION, SOLUTION INTRAMUSCULAR; INTRAVENOUS; SUBCUTANEOUS at 19:52

## 2023-08-06 RX ADMIN — HEPARIN SODIUM 2480 UNITS: 1000 INJECTION, SOLUTION INTRAVENOUS; SUBCUTANEOUS at 13:34

## 2023-08-06 RX ADMIN — DESMOPRESSIN ACETATE 40 MG: 0.2 TABLET ORAL at 03:14

## 2023-08-06 RX ADMIN — HYDROMORPHONE HYDROCHLORIDE 1 MG: 1 INJECTION, SOLUTION INTRAMUSCULAR; INTRAVENOUS; SUBCUTANEOUS at 23:57

## 2023-08-06 RX ADMIN — HYDROMORPHONE HYDROCHLORIDE 0.5 MG: 1 INJECTION, SOLUTION INTRAMUSCULAR; INTRAVENOUS; SUBCUTANEOUS at 07:52

## 2023-08-06 RX ADMIN — ALUMINUM HYDROXIDE, MAGNESIUM HYDROXIDE, AND SIMETHICONE 30 ML: 200; 200; 20 SUSPENSION ORAL at 02:10

## 2023-08-06 RX ADMIN — HYDROMORPHONE HYDROCHLORIDE 0.5 MG: 1 INJECTION, SOLUTION INTRAMUSCULAR; INTRAVENOUS; SUBCUTANEOUS at 02:10

## 2023-08-06 ASSESSMENT — PAIN SCALES - GENERAL
PAINLEVEL_OUTOF10: 8
PAINLEVEL_OUTOF10: 5
PAINLEVEL_OUTOF10: 9
PAINLEVEL_OUTOF10: 7
PAINLEVEL_OUTOF10: 9
PAINLEVEL_OUTOF10: 6
PAINLEVEL_OUTOF10: 10
PAINLEVEL_OUTOF10: 9
PAINLEVEL_OUTOF10: 7
PAINLEVEL_OUTOF10: 5
PAINLEVEL_OUTOF10: 10

## 2023-08-06 ASSESSMENT — ENCOUNTER SYMPTOMS
SINUS PAIN: 0
RHINORRHEA: 0
PHOTOPHOBIA: 0
DIARRHEA: 0
NAUSEA: 0
WHEEZING: 0
ABDOMINAL PAIN: 1
SORE THROAT: 0
BACK PAIN: 0
VOMITING: 0
ABDOMINAL DISTENTION: 0
COUGH: 0
STRIDOR: 0
SHORTNESS OF BREATH: 1

## 2023-08-06 ASSESSMENT — PAIN DESCRIPTION - DESCRIPTORS
DESCRIPTORS: CRAMPING;ACHING
DESCRIPTORS: CRAMPING;SHARP
DESCRIPTORS: ACHING;CRAMPING
DESCRIPTORS: ACHING;SHARP
DESCRIPTORS: CRAMPING;DISCOMFORT

## 2023-08-06 ASSESSMENT — PAIN DESCRIPTION - LOCATION
LOCATION: ABDOMEN
LOCATION: ABDOMEN
LOCATION: ABDOMEN;NOSE
LOCATION: ABDOMEN
LOCATION: ABDOMEN;NOSE

## 2023-08-06 ASSESSMENT — PAIN DESCRIPTION - ORIENTATION
ORIENTATION: MID
ORIENTATION: MID
ORIENTATION: MID;LOWER
ORIENTATION: RIGHT;LEFT;LOWER
ORIENTATION: MID;LEFT
ORIENTATION: RIGHT;LEFT;UPPER

## 2023-08-06 ASSESSMENT — PAIN - FUNCTIONAL ASSESSMENT
PAIN_FUNCTIONAL_ASSESSMENT: ACTIVITIES ARE NOT PREVENTED

## 2023-08-06 ASSESSMENT — PAIN DESCRIPTION - ONSET
ONSET: ON-GOING

## 2023-08-06 ASSESSMENT — PAIN DESCRIPTION - FREQUENCY
FREQUENCY: CONTINUOUS

## 2023-08-06 ASSESSMENT — PAIN DESCRIPTION - PAIN TYPE
TYPE: CHRONIC PAIN;ACUTE PAIN
TYPE: CHRONIC PAIN;DEEP SOMATIC PAIN
TYPE: ACUTE PAIN;CHRONIC PAIN

## 2023-08-06 NOTE — CARE COORDINATION
Case Management Assessment  Initial Evaluation    Date/Time of Evaluation: 8/6/2023 5:05 PM  Assessment Completed by: Allison Mcclain    If patient is discharged prior to next notation, then this note serves as note for discharge by case management. Patient Name: Amparo Soto                   YOB: 1965  Diagnosis: Shortness of breath [R06.02]  Other chest pain [R07.89]  Gastric outlet obstruction [K31.1]  Left pulmonary embolus ([address]) [I26.99]  Single subsegmental pulmonary embolism without acute cor pulmonale ([address]) [I26.93]                   Date / Time: 8/5/2023  9:40 PM    Patient Admission Status: Inpatient   Readmission Risk (Low < 19, Mod (19-27), High > 27): Readmission Risk Score: 21.7    Current PCP: No primary care provider on file. PCP verified by CM? No    History Provided by: Patient  Patient Orientation: Alert and Oriented    Patient Cognition: Alert    Hospitalization in the last 30 days (Readmission):  Yes    If yes, Readmission Assessment in  Navigator will be completed. Advance Directives:      Code Status: Full Code   Patient's Primary Decision Maker is: Legal Next of Kin      Discharge Planning:    Patient lives with: Alone Type of Home: House  Primary Care Giver: Self  Patient Support Systems include: None   Current Financial resources: Medicare, Medicaid  Current community resources:    Current services prior to admission: None            Current DME:              Type of Home Care services:  None    ADLS  Prior functional level: Independent in ADLs/IADLs  Current functional level:      PT AM-PAC:   /24  OT AM-PAC:   /24    Family can provide assistance at DC: Yes  Would you like Case Management to discuss the discharge plan with any other family members/significant others, and if so, who?  No  Plans to Return to Present Housing: Yes  Other Identified Issues/Barriers to RETURNING to current housing: lives on second level    Potential Assistance needed at discharge: N/A

## 2023-08-06 NOTE — PLAN OF CARE
Problem: Discharge Planning  Goal: Discharge to home or other facility with appropriate resources  Outcome: Progressing     Problem: Pain  Goal: Verbalizes/displays adequate comfort level or baseline comfort level  Outcome: Progressing  Flowsheets  Taken 8/6/2023 1200  Verbalizes/displays adequate comfort level or baseline comfort level:   Encourage patient to monitor pain and request assistance   Assess pain using appropriate pain scale   Administer analgesics based on type and severity of pain and evaluate response  Taken 8/6/2023 0800  Verbalizes/displays adequate comfort level or baseline comfort level:   Encourage patient to monitor pain and request assistance   Assess pain using appropriate pain scale   Administer analgesics based on type and severity of pain and evaluate response   Implement non-pharmacological measures as appropriate and evaluate response     Problem: ABCDS Injury Assessment  Goal: Absence of physical injury  Outcome: Progressing     Problem: Safety - Adult  Goal: Free from fall injury  Outcome: Progressing     Problem: Nutrition Deficit:  Goal: Optimize nutritional status  Outcome: Progressing     Problem: Skin/Tissue Integrity  Goal: Absence of new skin breakdown  Description: 1. Monitor for areas of redness and/or skin breakdown  2. Assess vascular access sites hourly  3. Every 4-6 hours minimum:  Change oxygen saturation probe site  4. Every 4-6 hours:  If on nasal continuous positive airway pressure, respiratory therapy assess nares and determine need for appliance change or resting period.   Outcome: Progressing

## 2023-08-06 NOTE — CONSULTS
Today's Date: 8/6/2023  Patient Name: Aida Mills  Date of admission: 8/5/2023  9:40 PM  Patient's age: 66 y.o., 1965  Admission Dx: Shortness of breath [R06.02]  Other chest pain [R07.89]  Gastric outlet obstruction [K31.1]  Left pulmonary embolus ([address]) [I26.99]  Single subsegmental pulmonary embolism without acute cor pulmonale ([address]) [I26.93]    Reason for Consult: management recommendations  Requesting Physician: Ute Mann DO    CHIEF COMPLAINT:  abd pain     History Obtained From:  patient    HISTORY OF PRESENT ILLNESS:      The patient is a 66 y.o.  male who is admitted to the hospital for worsening abdominal pain. He is known to us with recently diagnosed pancreatic cancer. Presenting with abdominal pain, obstructive jaundice and nausea and vomiting. CT scan showed suspicious appearing mass at the head of pancreas that was confirmed by you EUS. Biopsy showed adenocarcinoma and clinical stage was T3 N0 M0. The patient underwent stenting of the common bile duct and was treated with antibiotics for pneumonia and was discharged. Now he comes in with worsening abdominal pain. CT scan showed incidentally a segmental pulmonary embolus in the left posterior medial base. No obstruction or flow limitation was appreciated. CT abdomen pelvis showed a pancreatic mass which was identified previously. The stent has good localization. It was felt that his nausea and vomiting and abdominal discomfort is due to gastric outlet obstruction. NG tube was inserted by the surgical team and they are considering gastrojejunostomy bypass. Past Medical History:   has a past medical history of Abdominal pain, COVID-19 vaccine series completed, Duodenitis, Elevated CEA, Fatty liver, GERD (gastroesophageal reflux disease), History of recent hospitalization, Retroperitoneal mass, and Weight loss.     Past Surgical History:   has a past surgical history that includes Hand surgery; Urethra reactive, extraocular eye movements intact   Ears - bilateral TM's and external ear canals normal   Mouth - mucous membranes moist, pharynx normal without lesions   Neck - supple, no significant adenopathy   Lymphatics - no palpable lymphadenopathy, no hepatosplenomegaly   Chest - clear to auscultation, no wheezes, rales or rhonchi, symmetric air entry   Heart - normal rate, regular rhythm, normal S1, S2, no murmurs  Abdomen -NG in place, mild distention and mild tenderness.   No rebound tenderness appreciated  Neurological - alert, oriented, normal speech, no focal findings or movement disorder noted   Musculoskeletal - no joint tenderness, deformity or swelling   Extremities - peripheral pulses normal, no pedal edema, no clubbing or cyanosis   Skin - normal coloration and turgor, no rashes, no suspicious skin lesions noted ,    DATA:    Labs:   CBC:   Recent Labs     08/05/23 2149 08/06/23 0320   WBC 9.2 12.5*   HGB 12.3* 13.1   HCT 37.4* 43.5   PLT See Reflexed IPF Result 571*     BMP:   Recent Labs     08/05/23 2149 08/06/23 0320    136   K 3.3* 4.3   CO2 22 21   BUN 10 10   CREATININE 0.6* 0.7   LABGLOM >60 >60   GLUCOSE 96 145*     PT/INR:   Recent Labs     08/06/23 0320   PROTIME 14.2   INR 1.1       IMAGING DATA:      Primary Problem  Single subsegmental pulmonary embolism without acute cor pulmonale (HCC)    Active Hospital Problems    Diagnosis Date Noted    Single subsegmental pulmonary embolism without acute cor pulmonale (HCC) [I26.93] 08/06/2023    Gastric outlet obstruction [K31.1] 08/06/2023    Hypokalemia [E87.6] 08/06/2023    Anemia [D64.9] 08/06/2023    Chest pain [R07.9] 08/06/2023    Dizziness [R42] 08/06/2023    Dyspnea [R06.00] 08/06/2023    Cancer of head of pancreas ([address]) [C25.0] 08/06/2023    Severe malnutrition ([address]) [E43] 08/06/2023    Pancreatic adenocarcinoma ([address]) [C25.9] 07/28/2023    Pancreatic mass [K86.89] 06/20/2023    GERD (gastroesophageal reflux disease) [K21.9]

## 2023-08-06 NOTE — PROGRESS NOTES
Writer attempted to place NG 2x with no success. Pt refusing anymore attempts at this time. Writer notified on call NP. Will pass on to day team to place NG.

## 2023-08-06 NOTE — PROGRESS NOTES
Comprehensive Nutrition Assessment    Type and Reason for Visit:  Initial, Consult    Nutrition Recommendations/Plan:   Agree with start of nutrition support to meet nutrient needs. PPN vs. TPN pending line placement today. Recommend premix peripheral solution at 41.7 mL/hr vs. Custom central solution (d/t need for low dextrose amount). At high risk for refeeding. Advance feeds slowly. Malnutrition Assessment:  Malnutrition Status:  Severe malnutrition (08/06/23 1117)    Context:  Chronic Illness     Findings of the 6 clinical characteristics of malnutrition:  Energy Intake:  75% or less estimated energy requirements for 1 month or longer  Weight Loss:  Greater than 20% over 1 year     Body Fat Loss:   (Moderate to severe) Orbital, Triceps   Muscle Mass Loss:   (Moderate to severe) Temples (temporalis), Clavicles (pectoralis & deltoids), Hand (interosseous)  Fluid Accumulation:  No significant fluid accumulation     Strength:  Not Performed    Nutrition Assessment:    Pt with newly diagnosed pancreatic cancer. Having worsening abd pain and reports last BM 4-5 days ago. +Flatus. NGT placed this morning. Pt c/o worsening nausea and has not been able to eat at home because of this for last several months. States he has been mostly getting food from various shelters and churches and eating outside of the home. C/o significant / negative taste changes and c/o a lot of foods taste \"spicy\" including cookies (Oreos). Eating 1 meal per day. Reports overall decreased PO intake for past year. Prior to onset of symptoms, pt's appetite was good and ate a lot of microwaveable foods. Significant weight loss. Reports weight of 170-176 lbs 1 year ago. Noted weight of ~152 lbs 1 month ago and current weight of ~134.5 lbs at admission. Plan to PN today. PICC team consulted for line placement. Discussed with RN.     Nutrition Related Findings:    meds/labs reviewed; taste changes, nausea, abd pain; NGT to LIS Wound Type: None

## 2023-08-06 NOTE — PROCEDURES
Picc placement order:    Benefits include stable, long term intravenous access. Blood draws. Peripheral vein preservation. Safely deliver vesicant medications. Risks include failure to obtain the desired result(s) of the procedure, discomfort, injury, the need for additional procedures/therapies, permanent loss of body function, bleeding/bruising, arterial puncture, air embolism, nerve damage, hematoma, phlebitis, catheter fracture/rupture, catheter embolism, catheter occlusion, catheter migration, catheter site infection, unintentional/accidental removal of catheter, bloodstream infection, infiltration, cardiac arrhythmia, vein thrombosis, difficult catheter removal.   Alternatives discussed including centrally inserted central catheter, as well as less invasive procedures such as multiple peripheral IVs, extended dwell catheters and midline placements. Consent signed and obtained by proceduralist, from patient/DPOA. Picc placement note:    Prescribed IV Therapy = TPN    Peripheral ultrasound assessment done. Plan for left brachial vein insertion. Vein measurement = 0.51 cm and area based CVR= 10.5 %. Preferable CVR based on area would be less than 20% (which correlates to less than 45% linear CVR). Product type: Bard 5 fr DL lumen Power PICC. History/Labs/Allergies Reviewed  Placed By: [address] [place] [address] IV Team  Assisted By: Staff - RN  Time out Performed using Two Identifiers  Lot # [id number]  Expiration date = 11/30/2023  Trimmed at 43 cm  External catheter length 2 cm  Number of attempts 1  Estimated blood loss = 0.5 ml  Special equipment used- VPS Tip tracker, ultrasound, and micro-introducer technique   Catheter securement = adhesive 3M securement device  Catheter securement adhesive (secureportIV) utilized at insertion site  Dressing applied= Tegaderm CHG  Lidocaine administered intradermally conc.1%, approx 2 ml.      Chirag Milian RN aware picc placed with VPS ECG technology and is confirmed in the

## 2023-08-06 NOTE — CARE COORDINATION
08/06/23 1707   Readmission Assessment   Number of Days since last admission? 1-7 days   Previous Disposition Home Alone   Who is being Interviewed Patient   Did you visit your Primary Care Physician after you left the hospital, before you returned this time? No   Why weren't you able to visit your PCP? Did not have an appointment   Did you see a specialist, such as Cardiac, Pulmonary, Orthopedic Physician, etc. after you left the hospital? No   Who advised the patient to return to the hospital? Self-referral   Does the patient report anything that got in the way of taking their medications? No   In our efforts to provide the best possible care to you and others like you, can you think of anything that we could have done to help you after you left the hospital the first time, so that you might not have needed to return so soon?  Other (Comment)  (pain control)

## 2023-08-06 NOTE — ED NOTES
Pt presents to ED with complaints of chest pain, dizziness, and abdominal pain that started this evening. Pt denies LOC or SOB. Pt denies vomiting or diarrhea. Pt denies cardiac hx. Pt does have prostate cancer and is no longer taking medication for this. Pt alert and oriented, resting in stretcher. Pt connected to monitor, EKG done. IV placed.       Dario Galvan RN  08/05/23 5637

## 2023-08-06 NOTE — ED PROVIDER NOTES
[address] [place] [address] ED  Emergency Department Encounter  Emergency Medicine Resident     Pt Ekaterina Cruz  MRN: [id number]  [facility] 1965  Date of evaluation: 8/5/23  PCP:  No primary care provider on file. Note Started: 9:50 PM EDT      CHIEF COMPLAINT       Chief Complaint   Patient presents with    Chest Pain    Dizziness    Abdominal Pain       HISTORY OF PRESENT ILLNESS  (Location/Symptom, Timing/Onset, Context/Setting, Quality, Duration, Modifying Factors, Severity.)      Kelsi Osborn is a 66 y.o. male with PMH including pancreatic cancer who presents to the emergency department with sudden onset substernal chest pain, palpitations, and shortness of breath. Additionally, he has been experiencing abdominal pains as well. Patient states that the chest pain started in the middle of his chest and radiated to the left side of his chest.  He was laying in bed when this occurred. Denies radiation through to his back, down his left arm, and up into his jaw. He denies any cardiac history and is unsure of family cardiac history as he is not close with them. He mentions that he has smoked cigars since he was a teenager. Of note, patient has lost 40 pounds over the last 6 months. He denies fever, chills, back pains, syncope, N/V/D. PAST MEDICAL / SURGICAL / SOCIAL / FAMILY HISTORY      has a past medical history of Abdominal pain, COVID-19 vaccine series completed, Duodenitis, Elevated CEA, Fatty liver, GERD (gastroesophageal reflux disease), History of recent hospitalization, Retroperitoneal mass, and Weight loss. has a past surgical history that includes Hand surgery; Urethra surgery; Esophagogastroduodenoscopy (07/24/2023); ERCP (07/24/2023); Upper gastrointestinal endoscopy (N/A, 7/24/2023); ERCP (N/A, 7/24/2023); and Upper gastrointestinal endoscopy (7/24/2023).     Social History     Socioeconomic History    Marital status: Single     Spouse name: Not on file    Number of

## 2023-08-06 NOTE — PLAN OF CARE
[facility]  Office: [address], DO, Devyn Curet, DO, Santhosh Pozos, DO, Rufina [place] Blood, DO, Ernesto Cee MD, Martinez Kirk MD, Aissatou Monteiro MD, Kenyon Mishra MD,  Ez Briscoe MD, Jerri Marsh MD, Mere Ann, DO, Kenroy Ford MD,  Carole Reynoso, DO, Dilan Crow MD, Harleen Phillips MD, Stephie Lopez, DO, Denis Flores MD,  Lisbet Abad, DO, Brian Gallegos MD, Ankush Simpson MD, Michelle Daurte MD, Lilian Card MD,  Lennie Rivera MD, Colten Mccord MD, Zaida Cody, DO, Philis Halsted, MD,  Shanelle Mack MD, Kina Deal, Sachi Smiley, CNP, Sharon Stauffer, CNP, Sd Staples, CNP,  Cheryle Balding, DNP, Valdemar Peabody, CNP, Dylon Mtz, CNP, Lidia Almonte, CNP, Adán Galloway, CNP, Quyen Hernández, CNP, Bipin Cornelius, PA-C, Marie Leak, CNS, Fritz Arenas, CNP, Yoli Silver, [address]    Second Visit Note  For more detailed information please refer to the progress note of the day      8/6/2023    8:11 AM    Name:   Moshe Taylor  MRN:     [id number]     Acct:      [de-identified]   Room:   [address] [place] [address] Day:  0  Admit Date:  8/5/2023  9:40 PM    PCP:   No primary care provider on file. Code Status:  Full Code      Pt vitals were reviewed   New labs were reviewed   Patient was seen  He feels comfortable today     Updated plan :     Chest pain likely due to not massive pulmonary embolism  Troponin negative x2. CTA shows short segment pulmonary embolism  Start therapeutic anticoagulation-> continue heparin gtt for now if no surgical intervention will switch to eliquis.   Worsening abdominal pain concern for gastric outlet obstruction  CT showed marked gastric distention  NG tube, NPO, PPI, Discussed with , keep NPO for now and monitor clinical progress if not improved may need OR this week   Acute Hypokalemia  Replace potassium  Chronic anemia  Elaine 2/2 chronic disease, currently at baseline.    Shortness of breath/dizziness  GERD      Nestor Olmos DO  8/6/2023  8:11 AM

## 2023-08-07 ENCOUNTER — TELEPHONE (OUTPATIENT)
Dept: ONCOLOGY | Age: 58
End: 2023-08-07

## 2023-08-07 PROBLEM — Z51.5 ENCOUNTER FOR PALLIATIVE CARE: Status: ACTIVE | Noted: 2023-08-07

## 2023-08-07 PROBLEM — C25.0 MALIGNANT NEOPLASM OF HEAD OF PANCREAS (HCC): Status: ACTIVE | Noted: 2023-08-07

## 2023-08-07 PROBLEM — R11.0 NAUSEA: Status: ACTIVE | Noted: 2023-08-07

## 2023-08-07 PROBLEM — Z01.818 PREOPERATIVE CLEARANCE: Status: ACTIVE | Noted: 2023-08-07

## 2023-08-07 PROBLEM — R52 PAIN: Status: ACTIVE | Noted: 2023-08-07

## 2023-08-07 PROBLEM — Z71.89 GOALS OF CARE, COUNSELING/DISCUSSION: Status: ACTIVE | Noted: 2023-08-07

## 2023-08-07 PROBLEM — Z71.89 ACP (ADVANCE CARE PLANNING): Status: ACTIVE | Noted: 2023-08-07

## 2023-08-07 LAB
ALBUMIN SERPL-MCNC: 3.4 G/DL (ref 3.5–5.2)
ALBUMIN/GLOB SERPL: 1.2 {RATIO} (ref 1–2.5)
ALP SERPL-CCNC: 139 U/L (ref 40–129)
ALT SERPL-CCNC: 30 U/L (ref 5–41)
ANION GAP SERPL CALCULATED.3IONS-SCNC: 8 MMOL/L (ref 9–17)
ANTI-XA UNFRAC HEPARIN: 0.13 IU/L
ANTI-XA UNFRAC HEPARIN: 0.26 IU/L
ANTI-XA UNFRAC HEPARIN: 0.31 IU/L
ANTI-XA UNFRAC HEPARIN: 0.59 IU/L
AST SERPL-CCNC: 18 U/L
BASOPHILS # BLD: 0.09 K/UL (ref 0–0.2)
BASOPHILS NFR BLD: 1 % (ref 0–2)
BILIRUB SERPL-MCNC: 0.4 MG/DL (ref 0.3–1.2)
BUN SERPL-MCNC: 10 MG/DL (ref 6–20)
CALCIUM SERPL-MCNC: 9 MG/DL (ref 8.6–10.4)
CHLORIDE SERPL-SCNC: 103 MMOL/L (ref 98–107)
CHOLEST SERPL-MCNC: 129 MG/DL
CHOLESTEROL/HDL RATIO: 2.8
CO2 SERPL-SCNC: 27 MMOL/L (ref 20–31)
CREAT SERPL-MCNC: 0.7 MG/DL (ref 0.7–1.2)
EKG ATRIAL RATE: 69 BPM
EKG ATRIAL RATE: 78 BPM
EKG P AXIS: 75 DEGREES
EKG P AXIS: 81 DEGREES
EKG P-R INTERVAL: 152 MS
EKG P-R INTERVAL: 160 MS
EKG Q-T INTERVAL: 402 MS
EKG Q-T INTERVAL: 410 MS
EKG QRS DURATION: 82 MS
EKG QRS DURATION: 86 MS
EKG QTC CALCULATION (BAZETT): 439 MS
EKG QTC CALCULATION (BAZETT): 458 MS
EKG R AXIS: 78 DEGREES
EKG R AXIS: 88 DEGREES
EKG T AXIS: 65 DEGREES
EKG T AXIS: 72 DEGREES
EKG VENTRICULAR RATE: 69 BPM
EKG VENTRICULAR RATE: 78 BPM
EOSINOPHIL # BLD: 0.19 K/UL (ref 0–0.44)
EOSINOPHILS RELATIVE PERCENT: 2 % (ref 1–4)
ERYTHROCYTE [DISTWIDTH] IN BLOOD BY AUTOMATED COUNT: 14.2 % (ref 11.8–14.4)
GFR SERPL CREATININE-BSD FRML MDRD: >60 ML/MIN/1.73M2
GLUCOSE SERPL-MCNC: 118 MG/DL (ref 70–99)
HCT VFR BLD AUTO: 39.2 % (ref 40.7–50.3)
HDLC SERPL-MCNC: 46 MG/DL
HGB BLD-MCNC: 12.6 G/DL (ref 13–17)
IMM GRANULOCYTES # BLD AUTO: 0.04 K/UL (ref 0–0.3)
IMM GRANULOCYTES NFR BLD: 0 %
INR PPP: 1.1
LDLC SERPL CALC-MCNC: 70 MG/DL (ref 0–130)
LYMPHOCYTES NFR BLD: 2.63 K/UL (ref 1.1–3.7)
LYMPHOCYTES RELATIVE PERCENT: 24 % (ref 24–43)
MAGNESIUM SERPL-MCNC: 2.1 MG/DL (ref 1.6–2.6)
MCH RBC QN AUTO: 28.4 PG (ref 25.2–33.5)
MCHC RBC AUTO-ENTMCNC: 32.1 G/DL (ref 28.4–34.8)
MCV RBC AUTO: 88.5 FL (ref 82.6–102.9)
METER GLUCOSE: 110 MG/DL (ref 75–110)
METER GLUCOSE: 142 MG/DL (ref 75–110)
METER GLUCOSE: 151 MG/DL (ref 75–110)
METER GLUCOSE: 166 MG/DL (ref 75–110)
MONOCYTES NFR BLD: 0.86 K/UL (ref 0.1–1.2)
MONOCYTES NFR BLD: 8 % (ref 3–12)
NEUTROPHILS NFR BLD: 65 % (ref 36–65)
NEUTS SEG NFR BLD: 6.96 K/UL (ref 1.5–8.1)
NRBC BLD-RTO: 0 PER 100 WBC
PHOSPHATE SERPL-MCNC: 3.3 MG/DL (ref 2.5–4.5)
PLATELET # BLD AUTO: 654 K/UL (ref 138–453)
PMV BLD AUTO: 8.9 FL (ref 8.1–13.5)
POTASSIUM SERPL-SCNC: 4.1 MMOL/L (ref 3.7–5.3)
PROT SERPL-MCNC: 6.2 G/DL (ref 6.4–8.3)
PROTHROMBIN TIME: 13.5 SEC (ref 11.7–14.9)
RBC # BLD AUTO: 4.43 M/UL (ref 4.21–5.77)
SODIUM SERPL-SCNC: 138 MMOL/L (ref 135–144)
TRIGL SERPL-MCNC: 64 MG/DL
WBC OTHER # BLD: 10.8 K/UL (ref 3.5–11.3)

## 2023-08-07 PROCEDURE — 6360000002 HC RX W HCPCS: Performed by: NURSE PRACTITIONER

## 2023-08-07 PROCEDURE — 80061 LIPID PANEL: CPT

## 2023-08-07 PROCEDURE — 99222 1ST HOSP IP/OBS MODERATE 55: CPT | Performed by: NURSE PRACTITIONER

## 2023-08-07 PROCEDURE — 93010 ELECTROCARDIOGRAM REPORT: CPT | Performed by: INTERNAL MEDICINE

## 2023-08-07 PROCEDURE — 6360000002 HC RX W HCPCS: Performed by: SURGERY

## 2023-08-07 PROCEDURE — 83735 ASSAY OF MAGNESIUM: CPT

## 2023-08-07 PROCEDURE — 99232 SBSQ HOSP IP/OBS MODERATE 35: CPT | Performed by: INTERNAL MEDICINE

## 2023-08-07 PROCEDURE — 99222 1ST HOSP IP/OBS MODERATE 55: CPT | Performed by: INTERNAL MEDICINE

## 2023-08-07 PROCEDURE — C9113 INJ PANTOPRAZOLE SODIUM, VIA: HCPCS | Performed by: INTERNAL MEDICINE

## 2023-08-07 PROCEDURE — 99231 SBSQ HOSP IP/OBS SF/LOW 25: CPT | Performed by: SURGERY

## 2023-08-07 PROCEDURE — 2060000000 HC ICU INTERMEDIATE R&B

## 2023-08-07 PROCEDURE — 82947 ASSAY GLUCOSE BLOOD QUANT: CPT

## 2023-08-07 PROCEDURE — 6360000002 HC RX W HCPCS: Performed by: INTERNAL MEDICINE

## 2023-08-07 PROCEDURE — 99497 ADVNCD CARE PLAN 30 MIN: CPT | Performed by: NURSE PRACTITIONER

## 2023-08-07 PROCEDURE — APPSS60 APP SPLIT SHARED TIME 46-60 MINUTES: Performed by: NURSE PRACTITIONER

## 2023-08-07 PROCEDURE — 85610 PROTHROMBIN TIME: CPT

## 2023-08-07 PROCEDURE — 85520 HEPARIN ASSAY: CPT

## 2023-08-07 PROCEDURE — 2580000003 HC RX 258: Performed by: NURSE PRACTITIONER

## 2023-08-07 PROCEDURE — 2580000003 HC RX 258: Performed by: SURGERY

## 2023-08-07 PROCEDURE — 84100 ASSAY OF PHOSPHORUS: CPT

## 2023-08-07 PROCEDURE — 2580000003 HC RX 258: Performed by: INTERNAL MEDICINE

## 2023-08-07 PROCEDURE — 85025 COMPLETE CBC W/AUTO DIFF WBC: CPT

## 2023-08-07 PROCEDURE — 80053 COMPREHEN METABOLIC PANEL: CPT

## 2023-08-07 PROCEDURE — 2500000003 HC RX 250 WO HCPCS: Performed by: SURGERY

## 2023-08-07 PROCEDURE — 36415 COLL VENOUS BLD VENIPUNCTURE: CPT

## 2023-08-07 RX ADMIN — HEPARIN SODIUM 22 UNITS/KG/HR: 10000 INJECTION, SOLUTION INTRAVENOUS at 22:00

## 2023-08-07 RX ADMIN — SODIUM CHLORIDE, PRESERVATIVE FREE 40 MG: 5 INJECTION INTRAVENOUS at 20:01

## 2023-08-07 RX ADMIN — SODIUM CHLORIDE, PRESERVATIVE FREE 40 MG: 5 INJECTION INTRAVENOUS at 08:02

## 2023-08-07 RX ADMIN — HYDROMORPHONE HYDROCHLORIDE 1 MG: 1 INJECTION, SOLUTION INTRAMUSCULAR; INTRAVENOUS; SUBCUTANEOUS at 16:24

## 2023-08-07 RX ADMIN — SODIUM CHLORIDE, POTASSIUM CHLORIDE, SODIUM LACTATE AND CALCIUM CHLORIDE: 600; 310; 30; 20 INJECTION, SOLUTION INTRAVENOUS at 06:28

## 2023-08-07 RX ADMIN — I.V. FAT EMULSION 100 ML: 20 EMULSION INTRAVENOUS at 18:17

## 2023-08-07 RX ADMIN — HYDROMORPHONE HYDROCHLORIDE 1 MG: 1 INJECTION, SOLUTION INTRAMUSCULAR; INTRAVENOUS; SUBCUTANEOUS at 12:05

## 2023-08-07 RX ADMIN — HEPARIN SODIUM 4970 UNITS: 1000 INJECTION, SOLUTION INTRAVENOUS; SUBCUTANEOUS at 14:01

## 2023-08-07 RX ADMIN — SODIUM CHLORIDE, PRESERVATIVE FREE 10 ML: 5 INJECTION INTRAVENOUS at 20:02

## 2023-08-07 RX ADMIN — HYDROMORPHONE HYDROCHLORIDE 1 MG: 1 INJECTION, SOLUTION INTRAMUSCULAR; INTRAVENOUS; SUBCUTANEOUS at 07:59

## 2023-08-07 RX ADMIN — POTASSIUM CHLORIDE: 2 INJECTION, SOLUTION, CONCENTRATE INTRAVENOUS at 18:17

## 2023-08-07 RX ADMIN — SODIUM CHLORIDE, POTASSIUM CHLORIDE, SODIUM LACTATE AND CALCIUM CHLORIDE: 600; 310; 30; 20 INJECTION, SOLUTION INTRAVENOUS at 16:23

## 2023-08-07 RX ADMIN — HYDROMORPHONE HYDROCHLORIDE 1 MG: 1 INJECTION, SOLUTION INTRAMUSCULAR; INTRAVENOUS; SUBCUTANEOUS at 20:01

## 2023-08-07 RX ADMIN — HYDROMORPHONE HYDROCHLORIDE 1 MG: 1 INJECTION, SOLUTION INTRAMUSCULAR; INTRAVENOUS; SUBCUTANEOUS at 04:02

## 2023-08-07 ASSESSMENT — PAIN DESCRIPTION - ORIENTATION
ORIENTATION: UPPER;RIGHT;LEFT
ORIENTATION: MID
ORIENTATION: UPPER

## 2023-08-07 ASSESSMENT — PAIN SCALES - GENERAL
PAINLEVEL_OUTOF10: 9
PAINLEVEL_OUTOF10: 6
PAINLEVEL_OUTOF10: 5
PAINLEVEL_OUTOF10: 8
PAINLEVEL_OUTOF10: 9

## 2023-08-07 ASSESSMENT — PAIN DESCRIPTION - LOCATION
LOCATION: ABDOMEN
LOCATION: ABDOMEN;CHEST
LOCATION: ABDOMEN

## 2023-08-07 ASSESSMENT — PAIN DESCRIPTION - DESCRIPTORS
DESCRIPTORS: SHARP
DESCRIPTORS: CRAMPING;ACHING

## 2023-08-07 NOTE — PROGRESS NOTES
Comprehensive Nutrition Assessment    Type and Reason for Visit:  Reassess    Nutrition Recommendations/Plan:   Continue TPN. Increase to 46 mL/hr with 75 gm AA and 175 gm dextrose. Add MVI to next bag. Continue to monitor labs and increase slowly r/t concern for refeeding. Monitor labs, Adjust TPN as needed. Malnutrition Assessment:  Malnutrition Status:  Severe malnutrition (08/06/23 1117)    Context:  Chronic Illness     Findings of the 6 clinical characteristics of malnutrition:  Energy Intake:  75% or less estimated energy requirements for 1 month or longer  Weight Loss:  Greater than 20% over 1 year     Body Fat Loss:   (Moderate to severe) Orbital, Triceps   Muscle Mass Loss:   (Moderate to severe) Temples (temporalis), Clavicles (pectoralis & deltoids), Hand (interosseous)  Fluid Accumulation:  No significant fluid accumulation     Strength:  Not Performed    Nutrition Assessment:    Patient seen for follow up. Plan to continue TPN/IL, increase slowly r/t risk for refeeding syndrome. Plan for [facility] placement Wednesday or Friday this week. glucose 118. Meds reviewed. Nutrition Related Findings:    meds/labs reviewed; taste changes, nausea, abd pain; NGT to LIS Wound Type: None       Current Nutrition Intake & Therapies:    Average Meal Intake: NPO  Average Supplements Intake: NPO  Current Parenteral Nutrition Orders:  Type and Formula: 2-in-1 Standard (50 gm AA, 150 gm dextrose)   Lipids: 100ml, Daily  Duration: Continuous  Rate/Volume: 42 mL/hr  Current PN Order Provides: 910 kcal and 50 gm protein    Anthropometric Measures:  Height: 5' 9\" (175.3 cm)  Ideal Body Weight (IBW): 160 lbs (73 kg)       Current Body Weight: 134 lb 7.7 oz (61 kg), 84.1 % IBW. Weight Source: Bed Scale  Current BMI (kg/m2): 19.9  Usual Body Weight: 176 lb (79.8 kg) (pt report)  % Weight Change (Calculated): -23.6                    BMI Categories: Normal Weight (BMI 18.5-24. 9)    Estimated Daily Nutrient Needs:  Energy

## 2023-08-07 NOTE — CONSULTS
Attending Physician Statement:    I have discussed the care of  Steven Chong , including pertinent history and exam findings, with the Cardiology fellow/resident. I have seen and examined the patient and the key elements of all parts of the encounter have been performed by me. I agree with the assessment, plan and orders as documented by the fellow/resident, after I modified exam findings and plan of treatments, and the final version is my approved version of the assessment. Additional Comments: Seen and examined, agree with below evaluation and plan. Gastric outlet obstruction. No chest pain or SOB. Physically active. Can walk several hours and go several flights with no symptoms. ECG no acute changes. Echo preserved EF. Hulbert Graft for pancreatic surgery with intermediate cardiac risk. [employer] [facility] Cardiology    Consult / H&P               Today's Date: 8/7/2023  Patient Name: Steven Chong  Date of admission: 8/5/2023  9:40 PM  Patient's age: 66 y.o., 1965  Admission Dx: Shortness of breath [R06.02]  Other chest pain [R07.89]  Gastric outlet obstruction [K31.1]  Left pulmonary embolus ([address]) [I26.99]  Single subsegmental pulmonary embolism without acute cor pulmonale ([address]) [I26.93]    Reason for Consult:  Cardiac evaluation    Requesting Physician: Keysha Stewart DO    CHIEF COMPLAINT:  shortness of breath and chest pain    History Obtained From:  patient, electronic medical record    HISTORY OF PRESENT ILLNESS:      The patient is a 66 y.o. [place] male who is admitted to the hospital for shortness of breath and chest pain and abdominal pain. Cardiology is consulted for pre-op risk stratification. Patient has PMH - recently diagnosed adenocarcinoma of pancreas T3 N0 M0 and obstructive jaundice s/p biliary stenting 07/24. Patient came with SOB and found to have acute pulmonary embolism mid to distal branch left lung base on CT PE.  Started heparin gtt  He also abdominal

## 2023-08-07 NOTE — PROGRESS NOTES
Today's Date: 8/7/2023  Patient Name: Jennifer Peralta  Date of admission: 8/5/2023  9:40 PM  Patient's age: 66 y.o., 1965  Admission Dx: Shortness of breath [R06.02]  Other chest pain [R07.89]  Gastric outlet obstruction [K31.1]  Left pulmonary embolus ([address]) [I26.99]  Single subsegmental pulmonary embolism without acute cor pulmonale ([address]) [I26.93]    Reason for Consult: management recommendations  Requesting Physician: Brian Lucero DO    CHIEF COMPLAINT:  abd pain     Interim history  The patient is seen and evaluated. NG tube in place. He is on TPN on heparin and tolerating the combination well. Surgical input appreciated with the plans of possible surgery tomorrow. HISTORY OF PRESENT ILLNESS:      The patient is a 66 y.o.  male who is admitted to the hospital for worsening abdominal pain. He is known to us with recently diagnosed pancreatic cancer. Presenting with abdominal pain, obstructive jaundice and nausea and vomiting. CT scan showed suspicious appearing mass at the head of pancreas that was confirmed by you EUS. Biopsy showed adenocarcinoma and clinical stage was T3 N0 M0. The patient underwent stenting of the common bile duct and was treated with antibiotics for pneumonia and was discharged. Now he comes in with worsening abdominal pain. CT scan showed incidentally a segmental pulmonary embolus in the left posterior medial base. No obstruction or flow limitation was appreciated. CT abdomen pelvis showed a pancreatic mass which was identified previously. The stent has good localization. It was felt that his nausea and vomiting and abdominal discomfort is due to gastric outlet obstruction. NG tube was inserted by the surgical team and they are considering gastrojejunostomy bypass.   Past Medical History:   has a past medical history of Abdominal pain, COVID-19 vaccine series completed, Duodenitis, Elevated CEA, Fatty liver, GERD (gastroesophageal reflux

## 2023-08-07 NOTE — PLAN OF CARE
Problem: Discharge Planning  Goal: Discharge to home or other facility with appropriate resources  8/6/2023 2213 by Ayesha Faustin RN  Outcome: Progressing  8/6/2023 1647 by Erik Dove RN  Outcome: Progressing     Problem: Pain  Goal: Verbalizes/displays adequate comfort level or baseline comfort level  8/6/2023 2213 by Ayesha Faustin RN  Outcome: Progressing  8/6/2023 1647 by Erik Dove RN  Outcome: Progressing  Flowsheets  Taken 8/6/2023 1200  Verbalizes/displays adequate comfort level or baseline comfort level:   Encourage patient to monitor pain and request assistance   Assess pain using appropriate pain scale   Administer analgesics based on type and severity of pain and evaluate response  Taken 8/6/2023 0800  Verbalizes/displays adequate comfort level or baseline comfort level:   Encourage patient to monitor pain and request assistance   Assess pain using appropriate pain scale   Administer analgesics based on type and severity of pain and evaluate response   Implement non-pharmacological measures as appropriate and evaluate response     Problem: ABCDS Injury Assessment  Goal: Absence of physical injury  8/6/2023 2213 by Ayesha Faustin RN  Outcome: Progressing  8/6/2023 1647 by Erik Dove RN  Outcome: Progressing     Problem: Safety - Adult  Goal: Free from fall injury  8/6/2023 2213 by Ayesha Faustin RN  Outcome: Progressing  8/6/2023 1647 by Erik Dove RN  Outcome: Progressing     Problem: Nutrition Deficit:  Goal: Optimize nutritional status  8/6/2023 2213 by Ayesha Faustin RN  Outcome: Progressing  8/6/2023 1647 by Erik Dove RN  Outcome: Progressing     Problem: Skin/Tissue Integrity  Goal: Absence of new skin breakdown  Description: 1. Monitor for areas of redness and/or skin breakdown  2. Assess vascular access sites hourly  3. Every 4-6 hours minimum:  Change oxygen saturation probe site  4.   Every 4-6 hours:  If on nasal continuous positive airway pressure, respiratory therapy assess nares and determine need for appliance change or resting period.   8/6/2023 2213 by Fartun Huber RN  Outcome: Progressing  8/6/2023 1647 by Barry Sanchez RN  Outcome: Progressing

## 2023-08-07 NOTE — PROGRESS NOTES
I saw and evaluated the patient, performing the key elements of the service. I discussed the findings, assessment and plan with the nurse practitioner/resident and agree with the their findings and plan as documented in the their note. Plan for robotic [facility] Wed/Fri pending cardiac clearance  UGI yesterday with GOO  PE on AC  High NGT output  PPI  TPN  Will follow    Time spent caring for patient 25 minutes    I spent greater than 50% of the visit coordinating care and answering all questions from patient, and family members, reviewing the possible outcome of the treatment. Reji Etienne MD DABS  Surgical Oncology/HPB Surgery  [facility]  [address]  [facility] [address]  Office:  [phone]  Fax:  [phone]  8/7/2023  9:06 AM

## 2023-08-07 NOTE — TELEPHONE ENCOUNTER
Name: Ruy Peterson  : 1965  MRN: [id number]    Oncology Navigation Follow-Up Note    Contact Type:  Telephone    Notes: Upon review of chart noted pt completed Dr. Yung Sexton 8/3 f/u & scheduled for Dr. Ajay Sexton f/u . Noted pt currently admitted @ [address] [place] [address]. Will continue to follow.     Electronically signed by Demetra Flores RN on 2023 at 8:01 AM
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
## 2023-08-07 NOTE — PROGRESS NOTES
[facility]  Office: [phone], DO, Ayla Gan, DO, Clarene Meckel, DO, Selena Paniagua Blood, DO, Yrn Paez MD, Pauline Lay MD, Pedro Farris MD, Juan Woodard MD,  Asael Chen MD, Aleida Mejias MD, Aydin Venegas, DO, Montana West MD,  Cain Porter MD, Leroy Ram MD, Keri Adams DO, Justin Blackburn MD,  Virgil Stoddard DO, Kaye Collins MD, Clarence Capps MD, Shannen Thompson MD, Ross Spencer MD,  Elizabeth Reed MD, Ricardo Nieto MD, Alvaro Palacios DO, Evan Echevarria MD,  Tonny Hill MD, Lavern Tejada, Melina Mercer, CNP, Chad Dyer, CNP, Rylan Campos, CNP,  Rosa Brito, [facility], Waqas Rojo, CNP, Bola Shea, CNP, Bharat Simpson, CNP, Trevor Cantu, CNP, Ngoc Abarca, CNP, Piper Mei PA-C, Mauricia Collet, CNS, Suzan El, [facility], Alex [facility], [address] [place] [address]    Progress Note    8/7/2023    12:09 PM    Name:   Evangelista Clifton  MRN:     [id number]     Acct:      [de-identified]   Room:   [address] [place] [address] Day:  1  Admit Date:  8/5/2023  9:40 PM    PCP:   No primary care provider on file. Code Status:  Full Code    Subjective:     Patient doing well today. Says his pain is better controlled than yesterday. No nausea, vomiting, diarrhea. No fevers or chills. Brief History: This is a 61-year-old male who presented to the hospital for evaluation of abdominal pain, nausea, vomiting. He has a history of poorly differentiated pancreatic adenocarcinoma status post biliary stenting with Dr. Deo Brooks. On admission patient was found to have a gastric outlet obstruction and is currently waiting for robotic versus open gastrojejunostomy    Medications: Allergies: Allergies   Allergen Reactions    Aspirin     Celery (Apium Graveolens Gabriella.  Jocelyne) Skin Test Nausea Only    Ciprofloxacin        Current Meds:   Scheduled Meds:    heparin

## 2023-08-07 NOTE — PLAN OF CARE
Problem: Discharge Planning  Goal: Discharge to home or other facility with appropriate resources  Outcome: Progressing  Flowsheets (Taken 8/7/2023 1837)  Discharge to home or other facility with appropriate resources:   Identify barriers to discharge with patient and caregiver   Arrange for needed discharge resources and transportation as appropriate   Identify discharge learning needs (meds, wound care, etc)     Problem: Pain  Goal: Verbalizes/displays adequate comfort level or baseline comfort level  Outcome: Progressing  Flowsheets (Taken 8/6/2023 1200 by Heidi Santos RN)  Verbalizes/displays adequate comfort level or baseline comfort level:   Encourage patient to monitor pain and request assistance   Assess pain using appropriate pain scale   Administer analgesics based on type and severity of pain and evaluate response     Problem: ABCDS Injury Assessment  Goal: Absence of physical injury  Outcome: Progressing  Flowsheets (Taken 8/7/2023 1837)  Absence of Physical Injury: Implement safety measures based on patient assessment     Problem: Safety - Adult  Goal: Free from fall injury  Outcome: Progressing  Flowsheets (Taken 8/7/2023 1837)  Free From Fall Injury: Instruct family/caregiver on patient safety     Problem: Nutrition Deficit:  Goal: Optimize nutritional status  Outcome: Progressing  Flowsheets (Taken 8/7/2023 1031 by Charleen Bynum RD)  Nutrient intake appropriate for improving, restoring, or maintaining nutritional needs:   Recommend, monitor, and adjust tube feedings and TPN/PPN based on assessed needs   Assess nutritional status and recommend course of action     Problem: Skin/Tissue Integrity  Goal: Absence of new skin breakdown  Description: 1. Monitor for areas of redness and/or skin breakdown  2. Assess vascular access sites hourly  3. Every 4-6 hours minimum:  Change oxygen saturation probe site  4.   Every 4-6 hours:  If on nasal continuous positive airway pressure, respiratory

## 2023-08-08 ENCOUNTER — APPOINTMENT (OUTPATIENT)
Dept: GENERAL RADIOLOGY | Age: 58
DRG: 326 | End: 2023-08-08
Payer: MEDICARE

## 2023-08-08 ENCOUNTER — HOSPITAL ENCOUNTER (OUTPATIENT)
Dept: PREADMISSION TESTING | Age: 58
Discharge: HOME | End: 2023-08-08

## 2023-08-08 ENCOUNTER — ANESTHESIA EVENT (OUTPATIENT)
Dept: OPERATING ROOM | Age: 58
End: 2023-08-08
Payer: MEDICARE

## 2023-08-08 ENCOUNTER — APPOINTMENT (OUTPATIENT)
Dept: INTERVENTIONAL RADIOLOGY/VASCULAR | Age: 58
DRG: 326 | End: 2023-08-08
Payer: MEDICARE

## 2023-08-08 PROBLEM — F17.200: Status: ACTIVE | Noted: 2023-08-08

## 2023-08-08 PROBLEM — J43.2 CENTRILOBULAR EMPHYSEMA (HCC): Status: ACTIVE | Noted: 2023-08-08

## 2023-08-08 LAB
ABO + RH BLD: NORMAL
ALBUMIN SERPL-MCNC: 3.2 G/DL (ref 3.5–5.2)
ALBUMIN/GLOB SERPL: 1 {RATIO} (ref 1–2.5)
ALP SERPL-CCNC: 135 U/L (ref 40–129)
ALT SERPL-CCNC: 23 U/L (ref 5–41)
ANION GAP SERPL CALCULATED.3IONS-SCNC: 14 MMOL/L (ref 9–17)
ANTI-XA UNFRAC HEPARIN: 0.35 IU/L
ARM BAND NUMBER: NORMAL
AST SERPL-CCNC: 18 U/L
BASOPHILS # BLD: 0.06 K/UL (ref 0–0.2)
BASOPHILS NFR BLD: 1 % (ref 0–2)
BILIRUB SERPL-MCNC: 0.3 MG/DL (ref 0.3–1.2)
BLOOD BANK SAMPLE EXPIRATION: NORMAL
BLOOD GROUP ANTIBODIES SERPL: NEGATIVE
BNP SERPL-MCNC: 216 PG/ML
BUN SERPL-MCNC: 10 MG/DL (ref 6–20)
CALCIUM SERPL-MCNC: 9.3 MG/DL (ref 8.6–10.4)
CHLORIDE SERPL-SCNC: 103 MMOL/L (ref 98–107)
CO2 SERPL-SCNC: 22 MMOL/L (ref 20–31)
CREAT SERPL-MCNC: 0.7 MG/DL (ref 0.7–1.2)
CRP SERPL HS-MCNC: 12.9 MG/L (ref 0–5)
EOSINOPHIL # BLD: 0.28 K/UL (ref 0–0.44)
EOSINOPHILS RELATIVE PERCENT: 3 % (ref 1–4)
ERYTHROCYTE [DISTWIDTH] IN BLOOD BY AUTOMATED COUNT: 14.3 % (ref 11.8–14.4)
GFR SERPL CREATININE-BSD FRML MDRD: >60 ML/MIN/1.73M2
GLUCOSE SERPL-MCNC: 122 MG/DL (ref 70–99)
HCT VFR BLD AUTO: 37.2 % (ref 40.7–50.3)
HGB BLD-MCNC: 12.5 G/DL (ref 13–17)
IMM GRANULOCYTES # BLD AUTO: <0.03 K/UL (ref 0–0.3)
IMM GRANULOCYTES NFR BLD: 0 %
INR PPP: 1
LYMPHOCYTES NFR BLD: 2.63 K/UL (ref 1.1–3.7)
LYMPHOCYTES RELATIVE PERCENT: 26 % (ref 24–43)
MAGNESIUM SERPL-MCNC: 2.2 MG/DL (ref 1.6–2.6)
MCH RBC QN AUTO: 29.5 PG (ref 25.2–33.5)
MCHC RBC AUTO-ENTMCNC: 33.6 G/DL (ref 28.4–34.8)
MCV RBC AUTO: 87.7 FL (ref 82.6–102.9)
METER GLUCOSE: 113 MG/DL (ref 75–110)
METER GLUCOSE: 130 MG/DL (ref 75–110)
METER GLUCOSE: 178 MG/DL (ref 75–110)
METER GLUCOSE: 183 MG/DL (ref 75–110)
MONOCYTES NFR BLD: 0.84 K/UL (ref 0.1–1.2)
MONOCYTES NFR BLD: 8 % (ref 3–12)
NEUTROPHILS NFR BLD: 62 % (ref 36–65)
NEUTS SEG NFR BLD: 6.28 K/UL (ref 1.5–8.1)
NRBC BLD-RTO: 0 PER 100 WBC
PHOSPHATE SERPL-MCNC: 3.7 MG/DL (ref 2.5–4.5)
PLATELET # BLD AUTO: 576 K/UL (ref 138–453)
PMV BLD AUTO: 9.8 FL (ref 8.1–13.5)
POTASSIUM SERPL-SCNC: 4.5 MMOL/L (ref 3.7–5.3)
PROCALCITONIN SERPL-MCNC: 0.08 NG/ML
PROT SERPL-MCNC: 6.4 G/DL (ref 6.4–8.3)
PROTHROMBIN TIME: 13.1 SEC (ref 11.7–14.9)
RBC # BLD AUTO: 4.24 M/UL (ref 4.21–5.77)
SODIUM SERPL-SCNC: 139 MMOL/L (ref 135–144)
WBC OTHER # BLD: 10.1 K/UL (ref 3.5–11.3)

## 2023-08-08 PROCEDURE — 6360000004 HC RX CONTRAST MEDICATION: Performed by: INTERNAL MEDICINE

## 2023-08-08 PROCEDURE — 85025 COMPLETE CBC W/AUTO DIFF WBC: CPT

## 2023-08-08 PROCEDURE — 82947 ASSAY GLUCOSE BLOOD QUANT: CPT

## 2023-08-08 PROCEDURE — 2580000003 HC RX 258: Performed by: INTERNAL MEDICINE

## 2023-08-08 PROCEDURE — 86850 RBC ANTIBODY SCREEN: CPT

## 2023-08-08 PROCEDURE — 99232 SBSQ HOSP IP/OBS MODERATE 35: CPT | Performed by: INTERNAL MEDICINE

## 2023-08-08 PROCEDURE — 83735 ASSAY OF MAGNESIUM: CPT

## 2023-08-08 PROCEDURE — 6360000002 HC RX W HCPCS: Performed by: NURSE PRACTITIONER

## 2023-08-08 PROCEDURE — 94761 N-INVAS EAR/PLS OXIMETRY MLT: CPT

## 2023-08-08 PROCEDURE — 2060000000 HC ICU INTERMEDIATE R&B

## 2023-08-08 PROCEDURE — 83880 ASSAY OF NATRIURETIC PEPTIDE: CPT

## 2023-08-08 PROCEDURE — 99231 SBSQ HOSP IP/OBS SF/LOW 25: CPT | Performed by: SURGERY

## 2023-08-08 PROCEDURE — 86140 C-REACTIVE PROTEIN: CPT

## 2023-08-08 PROCEDURE — 80053 COMPREHEN METABOLIC PANEL: CPT

## 2023-08-08 PROCEDURE — 85610 PROTHROMBIN TIME: CPT

## 2023-08-08 PROCEDURE — 6360000002 HC RX W HCPCS: Performed by: RADIOLOGY

## 2023-08-08 PROCEDURE — 84145 PROCALCITONIN (PCT): CPT

## 2023-08-08 PROCEDURE — 86901 BLOOD TYPING SEROLOGIC RH(D): CPT

## 2023-08-08 PROCEDURE — 86900 BLOOD TYPING SEROLOGIC ABO: CPT

## 2023-08-08 PROCEDURE — 37191 INS ENDOVAS VENA CAVA FILTR: CPT

## 2023-08-08 PROCEDURE — C9113 INJ PANTOPRAZOLE SODIUM, VIA: HCPCS | Performed by: INTERNAL MEDICINE

## 2023-08-08 PROCEDURE — 6360000002 HC RX W HCPCS: Performed by: SURGERY

## 2023-08-08 PROCEDURE — 85520 HEPARIN ASSAY: CPT

## 2023-08-08 PROCEDURE — 99223 1ST HOSP IP/OBS HIGH 75: CPT | Performed by: INTERNAL MEDICINE

## 2023-08-08 PROCEDURE — APPSS45 APP SPLIT SHARED TIME 31-45 MINUTES: Performed by: NURSE PRACTITIONER

## 2023-08-08 PROCEDURE — 6360000002 HC RX W HCPCS: Performed by: INTERNAL MEDICINE

## 2023-08-08 PROCEDURE — 2500000003 HC RX 250 WO HCPCS: Performed by: SURGERY

## 2023-08-08 PROCEDURE — 36415 COLL VENOUS BLD VENIPUNCTURE: CPT

## 2023-08-08 PROCEDURE — 74018 RADEX ABDOMEN 1 VIEW: CPT

## 2023-08-08 PROCEDURE — 2580000003 HC RX 258: Performed by: SURGERY

## 2023-08-08 PROCEDURE — 84100 ASSAY OF PHOSPHORUS: CPT

## 2023-08-08 PROCEDURE — C1769 GUIDE WIRE: HCPCS

## 2023-08-08 PROCEDURE — 06H03DZ INSERTION OF INTRALUMINAL DEVICE INTO INFERIOR VENA CAVA, PERCUTANEOUS APPROACH: ICD-10-PCS | Performed by: RADIOLOGY

## 2023-08-08 RX ORDER — FENTANYL CITRATE 50 UG/ML
INJECTION, SOLUTION INTRAMUSCULAR; INTRAVENOUS PRN
Status: COMPLETED | OUTPATIENT
Start: 2023-08-08 | End: 2023-08-08

## 2023-08-08 RX ORDER — ALBUTEROL SULFATE 2.5 MG/3ML
2.5 SOLUTION RESPIRATORY (INHALATION) EVERY 6 HOURS PRN
Status: DISCONTINUED | OUTPATIENT
Start: 2023-08-08 | End: 2023-08-14 | Stop reason: HOSPADM

## 2023-08-08 RX ADMIN — IOPAMIDOL 48 ML: 755 INJECTION, SOLUTION INTRAVENOUS at 13:54

## 2023-08-08 RX ADMIN — SODIUM CHLORIDE, PRESERVATIVE FREE 40 MG: 5 INJECTION INTRAVENOUS at 21:32

## 2023-08-08 RX ADMIN — FENTANYL CITRATE 50 MCG: 50 INJECTION, SOLUTION INTRAMUSCULAR; INTRAVENOUS at 13:11

## 2023-08-08 RX ADMIN — SODIUM CHLORIDE, POTASSIUM CHLORIDE, SODIUM LACTATE AND CALCIUM CHLORIDE: 600; 310; 30; 20 INJECTION, SOLUTION INTRAVENOUS at 06:09

## 2023-08-08 RX ADMIN — HYDROMORPHONE HYDROCHLORIDE 1 MG: 1 INJECTION, SOLUTION INTRAMUSCULAR; INTRAVENOUS; SUBCUTANEOUS at 12:03

## 2023-08-08 RX ADMIN — HYDROMORPHONE HYDROCHLORIDE 1 MG: 1 INJECTION, SOLUTION INTRAMUSCULAR; INTRAVENOUS; SUBCUTANEOUS at 00:04

## 2023-08-08 RX ADMIN — SODIUM CHLORIDE, PRESERVATIVE FREE 40 MG: 5 INJECTION INTRAVENOUS at 07:53

## 2023-08-08 RX ADMIN — HEPARIN SODIUM 22 UNITS/KG/HR: 10000 INJECTION, SOLUTION INTRAVENOUS at 17:05

## 2023-08-08 RX ADMIN — HYDROMORPHONE HYDROCHLORIDE 1 MG: 1 INJECTION, SOLUTION INTRAMUSCULAR; INTRAVENOUS; SUBCUTANEOUS at 04:08

## 2023-08-08 RX ADMIN — I.V. FAT EMULSION 100 ML: 20 EMULSION INTRAVENOUS at 18:06

## 2023-08-08 RX ADMIN — SODIUM CHLORIDE, PRESERVATIVE FREE 10 ML: 5 INJECTION INTRAVENOUS at 07:54

## 2023-08-08 RX ADMIN — POTASSIUM CHLORIDE: 2 INJECTION, SOLUTION, CONCENTRATE INTRAVENOUS at 18:06

## 2023-08-08 RX ADMIN — HYDROMORPHONE HYDROCHLORIDE 1 MG: 1 INJECTION, SOLUTION INTRAMUSCULAR; INTRAVENOUS; SUBCUTANEOUS at 20:32

## 2023-08-08 RX ADMIN — HYDROMORPHONE HYDROCHLORIDE 1 MG: 1 INJECTION, SOLUTION INTRAMUSCULAR; INTRAVENOUS; SUBCUTANEOUS at 16:34

## 2023-08-08 RX ADMIN — HYDROMORPHONE HYDROCHLORIDE 1 MG: 1 INJECTION, SOLUTION INTRAMUSCULAR; INTRAVENOUS; SUBCUTANEOUS at 07:53

## 2023-08-08 RX ADMIN — SODIUM CHLORIDE, PRESERVATIVE FREE 10 ML: 5 INJECTION INTRAVENOUS at 20:34

## 2023-08-08 ASSESSMENT — LIFESTYLE VARIABLES: SMOKING_STATUS: 1

## 2023-08-08 ASSESSMENT — PAIN DESCRIPTION - LOCATION
LOCATION: ABDOMEN
LOCATION: ABDOMEN
LOCATION: NOSE;ABDOMEN
LOCATION: ABDOMEN

## 2023-08-08 ASSESSMENT — PAIN DESCRIPTION - DESCRIPTORS: DESCRIPTORS: ACHING;SORE

## 2023-08-08 ASSESSMENT — PAIN SCALES - GENERAL
PAINLEVEL_OUTOF10: 10
PAINLEVEL_OUTOF10: 10
PAINLEVEL_OUTOF10: 8
PAINLEVEL_OUTOF10: 9
PAINLEVEL_OUTOF10: 8
PAINLEVEL_OUTOF10: 9

## 2023-08-08 ASSESSMENT — PAIN DESCRIPTION - ORIENTATION
ORIENTATION: UPPER;LEFT;RIGHT
ORIENTATION: LEFT;RIGHT;UPPER
ORIENTATION: RIGHT;LEFT;UPPER

## 2023-08-08 ASSESSMENT — ENCOUNTER SYMPTOMS: SHORTNESS OF BREATH: 1

## 2023-08-08 NOTE — CONSULTS
anterior wedge compression deformity. Moderate grade 1 anterolisthesis of L5 on S1 due to bilateral L5 pars defects. XR CHEST PORTABLE   Final Result   Near complete resolution of bibasilar airspace disease. XR ABDOMEN FOR NG/OG/NE TUBE PLACEMENT    (Results Pending)       Pulmonary Function test:    Polysomnogram:    Echocardiogram:   No results found for this or any previous visit. Cardiac Catheterization:   No results found for this or any previous visit. ASSESSMENT AND PLAN     Assessment:    //Centrilobular emphysematous changes present on CT scan. Severity of COPD is not known but patient is not symptomatic with pulmonary symptoms and has good exercise tolerance but risk for aspiration from gastric outlet obstruction as he had previously and recently and because of history of smoking risk of atelectasis impairment of secretion mobilization postoperatively is possible. No contraindication from pulmonary standpoint for surgery. Patient is low to moderate risk from pulmonary standpoint and okay to proceed with surgery. //Pulm embolism left lower lobe on anticoagulation  //Adenocarcinoma of head of pancreas  //Gastric outlet obstruction  //Recent right lower lobe pneumonia likely aspiration resolved clinically and radiologically  //Smoking history greater than 30-pack-year        Plan:    I personally interviewed/examined the patient; reviewed interval history, interpreted all available radiographic and laboratory data at the time of service. Low to moderate risk from pulmonary standpoint for general anesthesia/surgery. Okay to proceed with plan surgery. Patient is on heparin drip and when it is okay with surgery heparin drip can be resumed postoperatively.   Patient will need oral anticoagulation/DOAC and should be managed by oncology after discharge as patient has been getting malignancy and will be at risk for thromboembolism and will need chronic anticoagulation. Patient remains hemodynamically stable and is currently saturating well on room air. Supplemental O2 if to keep oxygen saturation greater than 92%  Will start the Spiriva once daily and will continue postoperatively during hospitalization. Will use albuterol as needed. Encourage incentive spirometry, pulmonary toilet, aspiration precautions and bronchodilators   On NG tube suction/NG drainage  On TPN  Antimicrobials reviewed; currently off antibiotic  DVT prophylaxis he is on therapeutic anticoagulation  Physical/occupational therapy; increase activity as tolerated. I updated the patient regarding the current clinical condition, provisional diagnosis and management plan. He verbalized a clear understanding and I addressed his concerns, and answered all questions to the best of my abilities. It was my pleasure to evaluate [place] today. We will continue to follow. I would like to thank you for allowing me to participate in the care of this patient. Please feel free to call with any further questions or concerns. Richa Daniels MD, M.D. Pulmonary and Critical Care Medicine           8/8/2023, 3:05 PM    Please note that this chart was generated using voice recognition Dragon dictation software. Although every effort was made to ensure the accuracy of this automated transcription, some errors in transcription may have occurred.

## 2023-08-08 NOTE — BRIEF OP NOTE
Brief Postoperative Note    Amparo Soto  YOB: 1965  [id number]    Pre-operative Diagnosis: Pancreatic CA    Post-operative Diagnosis: Same    Procedure: IVC filter placement     Anesthesia: Local    Surgeons/Assistants: Henrietta Sevilla MD    Estimated Blood Loss: less than 50     Complications: None    Specimens: Was Not Obtained    Findings: Successful placement non-retrievable IVC filter.      Electronically signed by JUAN C Healy on 8/8/2023 at 2:02 PM

## 2023-08-08 NOTE — PROGRESS NOTES
SPIRITUAL CARE PROGRESS NOTE    Spiritual Assessment: [Patient was apparently experiencing emotional distress. Patient declined spiritual care currently. Intervention:   followed up with spiritual consult request. Radha Hill entered room. Patient declined spiritual care. Outcome: There is disconnect between the medical staff and the patient. Patient does not want to see spiritual care currently.       08/08/23 1732   Encounter Summary   Service Provided For: Patient   Referral/Consult From: Physician   Support System Unknown   Last Encounter  08/08/23   Complexity of Encounter Moderate   Begin Time 1725   End Time  1727   Total Time Calculated 2 min   Encounter    Type Initial Screen/Assessment   Assessment/Intervention/Outcome   Assessment Unable to assess   Intervention Sustaining Presence/Ministry of presence   Outcome Refused/Declined

## 2023-08-08 NOTE — CARE COORDINATION
Met with patient to discuss transitional planning. Plan is home with home healthcare. Patient is scheduled to go to OR tomorrow for G-J tube placement. Freedom of choice provided and referral sent to [facility]. Patient will need cab ride home. Patient agreeable to plan.

## 2023-08-08 NOTE — PROGRESS NOTES
I saw and evaluated the patient, performing the key elements of the service. I discussed the findings, assessment and plan with the nurse practitioner/resident and agree with the their findings and plan as documented in the their note. Plan for robotic GJ, EGD, preop TAP block tomorrow  Cleared by cards  UGI with GOO  PE on AC, Hold heparin at MN  High NGT output  PPI  TPN  IVC filter today    Time spent caring for patient 25 minutes    I spent greater than 50% of the visit coordinating care and answering all questions from patient, and family members, reviewing the possible outcome of the treatment. Reji Etienne MD DABS  Surgical Oncology/HPB Surgery  [facility]  [address]  [facility] [address]  Office:  [phone]  Fax:  [phone]  8/8/2023  8:39 AM

## 2023-08-08 NOTE — PLAN OF CARE
Problem: Discharge Planning  Goal: Discharge to home or other facility with appropriate resources  8/7/2023 2253 by Darlene Crockett RN  Outcome: Progressing  8/7/2023 1837 by Mayuri Borrego RN  Outcome: Progressing  Flowsheets (Taken 8/7/2023 1837)  Discharge to home or other facility with appropriate resources:   Identify barriers to discharge with patient and caregiver   Arrange for needed discharge resources and transportation as appropriate   Identify discharge learning needs (meds, wound care, etc)     Problem: Pain  Goal: Verbalizes/displays adequate comfort level or baseline comfort level  8/7/2023 2253 by Darlene Crockett RN  Outcome: Progressing  8/7/2023 1837 by Mayuri Borrego RN  Outcome: Progressing  Flowsheets (Taken 8/6/2023 1200 by Hossein Asencio RN)  Verbalizes/displays adequate comfort level or baseline comfort level:   Encourage patient to monitor pain and request assistance   Assess pain using appropriate pain scale   Administer analgesics based on type and severity of pain and evaluate response     Problem: ABCDS Injury Assessment  Goal: Absence of physical injury  8/7/2023 2253 by Darlene Crockett RN  Outcome: Progressing  8/7/2023 1837 by Mayuri Borrego RN  Outcome: Progressing  Flowsheets (Taken 8/7/2023 1837)  Absence of Physical Injury: Implement safety measures based on patient assessment     Problem: Safety - Adult  Goal: Free from fall injury  8/7/2023 2253 by Darlene Crockett RN  Outcome: Progressing  8/7/2023 1837 by Mayuri Borrego RN  Outcome: Progressing  Flowsheets (Taken 8/7/2023 1837)  Free From Fall Injury: Instruct family/caregiver on patient safety     Problem: Nutrition Deficit:  Goal: Optimize nutritional status  8/7/2023 2253 by Darlene Crockett RN  Outcome: Progressing  8/7/2023 1837 by Mayuri Borrego RN  Outcome: Progressing  Flowsheets (Taken 8/7/2023 1031 by Shahana Artis RD)  Nutrient intake appropriate for improving, restoring, or maintaining nutritional needs:   Recommend, monitor, and adjust tube feedings and TPN/PPN based on assessed needs   Assess nutritional status and recommend course of action     Problem: Skin/Tissue Integrity  Goal: Absence of new skin breakdown  Description: 1. Monitor for areas of redness and/or skin breakdown  2. Assess vascular access sites hourly  3. Every 4-6 hours minimum:  Change oxygen saturation probe site  4. Every 4-6 hours:  If on nasal continuous positive airway pressure, respiratory therapy assess nares and determine need for appliance change or resting period. 8/7/2023 2253 by Jen Manjarrez RN  Outcome: Progressing  8/7/2023 1837 by Kae Nicole RN  Outcome: Progressing  Note: Skin monitored.

## 2023-08-08 NOTE — PROGRESS NOTES
Physical Therapy        Physical Therapy Cancel Note      DATE: 2023    NAME: Sanjiv Henriquez  MRN: [id number]   : 1965      Patient not seen this date for Physical Therapy due to:    Patient unavailable; heading to IR.     Electronically signed by Faina Thomas PT on 2023 at 3:21 PM

## 2023-08-08 NOTE — PROGRESS NOTES
[facility]  Office: [address], DO, Devyn Curet, DO, Santhosh Pozos, DO, Rufina Hallett Blood, DO, Ernesto Cee MD, Martinez Kirk MD, Aissatou Monteiro MD, Kenyon Mishra MD,  Ez Briscoe MD, Jerri Marsh MD, Mere Ann, DO, Kenroy Ford MD,  Sloan Borrego MD, Harleen Phillips MD, Stephie Lopez, DO, Denis Flores MD,  Lisbet Abad DO, Brian Gallegos MD, Anksuh Simpson MD, Michelle Duarte MD, Lilian Card MD,  Lennie Rivera MD, Colten Mccord MD, Zaida Cody, DO, Philis Halsted, MD,  Shanelle Mack MD, Kina Deal, Sachi Smiley, CNP, Sharon Stauffer, CNP, Sd Staples, CNP,  Cheryle Balding, [facility], Valdemar Peabody, CNP, Dylon Mtz, CNP, Lidia Almonte, CNP, Adán Galloway, CNP, Quyen Hernández, CNP, Bipin Cornelius, PA-C, Marie Leak, CNS, Fritz Arenas, CNP, Yoli Silver, [facility]    Progress Note    8/8/2023    5:54 PM    Name:   Moshe Taylor  MRN:     [id number]     Acct:      [de-identified]   Room:   [address] [place] [address] Day:  2  Admit Date:  8/5/2023  9:40 PM    PCP:   No primary care provider on file. Code Status:  Full Code    Subjective:     No complaints today, surgery tomorrow. No questions. Brief History: This is a 61-year-old male who presented to the hospital for evaluation of abdominal pain, nausea, vomiting. He has a history of poorly differentiated pancreatic adenocarcinoma status post biliary stenting with Dr. Nelson Lucero. On admission patient was found to have a gastric outlet obstruction and is currently waiting for robotic versus open gastrojejunostomy    Medications: Allergies: Allergies   Allergen Reactions    Aspirin     Celery (Apium Graveolens Gabriella.  Jocelyne) Skin Test Nausea Only    Ciprofloxacin        Current Meds:   Scheduled Meds:    heparin (porcine)  80 Units/kg IntraVENous Once    sodium chloride flush  5-40 mL IntraVENous 2

## 2023-08-08 NOTE — PROGRESS NOTES
Comprehensive Nutrition Assessment    Type and Reason for Visit:  Reassess    Nutrition Recommendations/Plan:   Continue TPN. Recommend increase to 53 mL/hr with 90 gm AA and 200 gm dextrose. Will provide 1240 kcal and 90 gm protein. Monitor labs and adjust TPN as needed. Monitor electrolytes, replace outside of TPN as needed     Malnutrition Assessment:  Malnutrition Status:  Severe malnutrition (08/06/23 1117)    Context:  Chronic Illness     Findings of the 6 clinical characteristics of malnutrition:  Energy Intake:  75% or less estimated energy requirements for 1 month or longer  Weight Loss:  Greater than 20% over 1 year     Body Fat Loss:   (Moderate to severe) Orbital, Triceps   Muscle Mass Loss:   (Moderate to severe) Temples (temporalis), Clavicles (pectoralis & deltoids), Hand (interosseous)  Fluid Accumulation:  No significant fluid accumulation     Strength:  Not Performed    Nutrition Assessment:    Patient seen for follow up. Plan to continue TPN/IL, increase to goal slowly. Plan for [facility] placement tomorrow. glucose 122-130. Meds reviewed. Nutrition Related Findings:    meds/labs reviewed; taste changes, nausea, abd pain; NGT to LIS Wound Type: None       Current Nutrition Intake & Therapies:    Average Meal Intake: NPO  Average Supplements Intake: NPO  Current Parenteral Nutrition Orders:  Type and Formula: 2-in-1 Standard (175 gm dextrose, 75 gm AA)   Lipids: 100ml, Daily  Duration: Continuous  Rate/Volume: 46 mL/hr  Current PN Order Provides: 1095 kcal and 75 gm protein  Goal PN Orders Provides:      Anthropometric Measures:  Height: 5' 9\" (175.3 cm)  Ideal Body Weight (IBW): 160 lbs (73 kg)       Current Body Weight: 140 lb 3.4 oz (63.6 kg), 87.6 % IBW. Weight Source: Standing Scale  Current BMI (kg/m2): 20.7  Usual Body Weight: 176 lb (79.8 kg) (pt report)  % Weight Change (Calculated): -23.6                    BMI Categories: Normal Weight (BMI 18.5-24. 9)    Estimated Daily Nutrient

## 2023-08-08 NOTE — PROGRESS NOTES
Surgery update:  - pending pulm risk stratification/clearance prior to jocelyn poss open [facility] tomorrow with Dr. Ellie Cavazos. Provider/attending contacted 8/7 and 8/8. Fellow paged.  Appreciate recs   - XR to confirm decompressive NG tube replacement   - hold hep gtt at midnight 8/9/2023 and maintain NPO status    Patient seen, examined and discussed with Dr. Baldev Cates, APRN  Surgical Oncology/ [facility]  Available through [facility]

## 2023-08-09 ENCOUNTER — ANESTHESIA (OUTPATIENT)
Dept: OPERATING ROOM | Age: 58
End: 2023-08-09
Payer: MEDICARE

## 2023-08-09 ENCOUNTER — APPOINTMENT (OUTPATIENT)
Dept: GENERAL RADIOLOGY | Age: 58
DRG: 326 | End: 2023-08-09
Payer: MEDICARE

## 2023-08-09 LAB
ALBUMIN SERPL-MCNC: 3.5 G/DL (ref 3.5–5.2)
ALBUMIN SERPL-MCNC: 3.7 G/DL (ref 3.5–5.2)
ALBUMIN SERPL-MCNC: 3.7 G/DL (ref 3.5–5.2)
ALBUMIN/GLOB SERPL: 1.1 {RATIO} (ref 1–2.5)
ALBUMIN/GLOB SERPL: 1.1 {RATIO} (ref 1–2.5)
ALBUMIN/GLOB SERPL: 1.2 {RATIO} (ref 1–2.5)
ALP SERPL-CCNC: 134 U/L (ref 40–129)
ALP SERPL-CCNC: 144 U/L (ref 40–129)
ALP SERPL-CCNC: 150 U/L (ref 40–129)
ALT SERPL-CCNC: 20 U/L (ref 5–41)
ALT SERPL-CCNC: 20 U/L (ref 5–41)
ALT SERPL-CCNC: 21 U/L (ref 5–41)
ANION GAP SERPL CALCULATED.3IONS-SCNC: 12 MMOL/L (ref 9–17)
ANION GAP SERPL CALCULATED.3IONS-SCNC: 13 MMOL/L (ref 9–17)
AST SERPL-CCNC: 13 U/L
AST SERPL-CCNC: 15 U/L
AST SERPL-CCNC: 17 U/L
BASOPHILS # BLD: 0.05 K/UL (ref 0–0.2)
BASOPHILS # BLD: 0.06 K/UL (ref 0–0.2)
BASOPHILS NFR BLD: 0 % (ref 0–2)
BASOPHILS NFR BLD: 1 % (ref 0–2)
BILIRUB DIRECT SERPL-MCNC: 0.2 MG/DL
BILIRUB INDIRECT SERPL-MCNC: 0.1 MG/DL (ref 0–1)
BILIRUB SERPL-MCNC: 0.3 MG/DL (ref 0.3–1.2)
BNP SERPL-MCNC: 133 PG/ML
BUN SERPL-MCNC: 12 MG/DL (ref 6–20)
BUN SERPL-MCNC: 16 MG/DL (ref 6–20)
CALCIUM SERPL-MCNC: 9.3 MG/DL (ref 8.6–10.4)
CALCIUM SERPL-MCNC: 9.8 MG/DL (ref 8.6–10.4)
CHLORIDE SERPL-SCNC: 102 MMOL/L (ref 98–107)
CHLORIDE SERPL-SCNC: 103 MMOL/L (ref 98–107)
CO2 SERPL-SCNC: 24 MMOL/L (ref 20–31)
CO2 SERPL-SCNC: 24 MMOL/L (ref 20–31)
CREAT SERPL-MCNC: 0.6 MG/DL (ref 0.7–1.2)
CREAT SERPL-MCNC: 0.8 MG/DL (ref 0.7–1.2)
CRP SERPL HS-MCNC: 14 MG/L (ref 0–5)
EOSINOPHIL # BLD: 0.15 K/UL (ref 0–0.44)
EOSINOPHIL # BLD: 0.22 K/UL (ref 0–0.44)
EOSINOPHILS RELATIVE PERCENT: 1 % (ref 1–4)
EOSINOPHILS RELATIVE PERCENT: 3 % (ref 1–4)
ERYTHROCYTE [DISTWIDTH] IN BLOOD BY AUTOMATED COUNT: 14 % (ref 11.8–14.4)
ERYTHROCYTE [DISTWIDTH] IN BLOOD BY AUTOMATED COUNT: 14.1 % (ref 11.8–14.4)
GFR SERPL CREATININE-BSD FRML MDRD: >60 ML/MIN/1.73M2
GFR SERPL CREATININE-BSD FRML MDRD: >60 ML/MIN/1.73M2
GLUCOSE SERPL-MCNC: 110 MG/DL (ref 70–99)
GLUCOSE SERPL-MCNC: 209 MG/DL (ref 70–99)
HCT VFR BLD AUTO: 39.2 % (ref 40.7–50.3)
HCT VFR BLD AUTO: 40.7 % (ref 40.7–50.3)
HGB BLD-MCNC: 12.3 G/DL (ref 13–17)
HGB BLD-MCNC: 13.3 G/DL (ref 13–17)
IMM GRANULOCYTES # BLD AUTO: 0.03 K/UL (ref 0–0.3)
IMM GRANULOCYTES # BLD AUTO: 0.14 K/UL (ref 0–0.3)
IMM GRANULOCYTES NFR BLD: 0 %
IMM GRANULOCYTES NFR BLD: 1 %
INR PPP: 1
INR PPP: 1
LYMPHOCYTES NFR BLD: 0.87 K/UL (ref 1.1–3.7)
LYMPHOCYTES NFR BLD: 1.91 K/UL (ref 1.1–3.7)
LYMPHOCYTES RELATIVE PERCENT: 22 % (ref 24–43)
LYMPHOCYTES RELATIVE PERCENT: 6 % (ref 24–43)
MAGNESIUM SERPL-MCNC: 2 MG/DL (ref 1.6–2.6)
MAGNESIUM SERPL-MCNC: 2.3 MG/DL (ref 1.6–2.6)
MCH RBC QN AUTO: 28.4 PG (ref 25.2–33.5)
MCH RBC QN AUTO: 29.2 PG (ref 25.2–33.5)
MCHC RBC AUTO-ENTMCNC: 31.4 G/DL (ref 28.4–34.8)
MCHC RBC AUTO-ENTMCNC: 32.7 G/DL (ref 28.4–34.8)
MCV RBC AUTO: 89.3 FL (ref 82.6–102.9)
MCV RBC AUTO: 90.5 FL (ref 82.6–102.9)
METER GLUCOSE: 118 MG/DL (ref 75–110)
METER GLUCOSE: 122 MG/DL (ref 75–110)
METER GLUCOSE: 205 MG/DL (ref 75–110)
MONOCYTES NFR BLD: 0.3 K/UL (ref 0.1–1.2)
MONOCYTES NFR BLD: 0.78 K/UL (ref 0.1–1.2)
MONOCYTES NFR BLD: 2 % (ref 3–12)
MONOCYTES NFR BLD: 9 % (ref 3–12)
NEUTROPHILS NFR BLD: 65 % (ref 36–65)
NEUTROPHILS NFR BLD: 90 % (ref 36–65)
NEUTS SEG NFR BLD: 13.51 K/UL (ref 1.5–8.1)
NEUTS SEG NFR BLD: 5.69 K/UL (ref 1.5–8.1)
NRBC BLD-RTO: 0 PER 100 WBC
NRBC BLD-RTO: 0 PER 100 WBC
PHOSPHATE SERPL-MCNC: 3.3 MG/DL (ref 2.5–4.5)
PHOSPHATE SERPL-MCNC: 3.5 MG/DL (ref 2.5–4.5)
PLATELET # BLD AUTO: 507 K/UL (ref 138–453)
PLATELET # BLD AUTO: 567 K/UL (ref 138–453)
PMV BLD AUTO: 8.9 FL (ref 8.1–13.5)
PMV BLD AUTO: 9.3 FL (ref 8.1–13.5)
POTASSIUM SERPL-SCNC: 4.4 MMOL/L (ref 3.7–5.3)
POTASSIUM SERPL-SCNC: 4.7 MMOL/L (ref 3.7–5.3)
PROCALCITONIN SERPL-MCNC: 0.09 NG/ML
PROT SERPL-MCNC: 6.5 G/DL (ref 6.4–8.3)
PROT SERPL-MCNC: 7 G/DL (ref 6.4–8.3)
PROT SERPL-MCNC: 7.1 G/DL (ref 6.4–8.3)
PROTHROMBIN TIME: 12.7 SEC (ref 11.7–14.9)
PROTHROMBIN TIME: 13.4 SEC (ref 11.7–14.9)
RBC # BLD AUTO: 4.33 M/UL (ref 4.21–5.77)
RBC # BLD AUTO: 4.56 M/UL (ref 4.21–5.77)
SODIUM SERPL-SCNC: 138 MMOL/L (ref 135–144)
SODIUM SERPL-SCNC: 140 MMOL/L (ref 135–144)
WBC OTHER # BLD: 15 K/UL (ref 3.5–11.3)
WBC OTHER # BLD: 8.7 K/UL (ref 3.5–11.3)

## 2023-08-09 PROCEDURE — 2500000003 HC RX 250 WO HCPCS

## 2023-08-09 PROCEDURE — 7100000001 HC PACU RECOVERY - ADDTL 15 MIN: Performed by: SURGERY

## 2023-08-09 PROCEDURE — 6360000002 HC RX W HCPCS: Performed by: INTERNAL MEDICINE

## 2023-08-09 PROCEDURE — 94664 DEMO&/EVAL PT USE INHALER: CPT

## 2023-08-09 PROCEDURE — 64488 TAP BLOCK BI INJECTION: CPT | Performed by: ANESTHESIOLOGY

## 2023-08-09 PROCEDURE — 2580000003 HC RX 258: Performed by: STUDENT IN AN ORGANIZED HEALTH CARE EDUCATION/TRAINING PROGRAM

## 2023-08-09 PROCEDURE — 43659 UNLISTED LAPS PX STOMACH: CPT | Performed by: SURGERY

## 2023-08-09 PROCEDURE — APPSS45 APP SPLIT SHARED TIME 31-45 MINUTES: Performed by: NURSE PRACTITIONER

## 2023-08-09 PROCEDURE — 6360000002 HC RX W HCPCS: Performed by: SURGERY

## 2023-08-09 PROCEDURE — 6360000002 HC RX W HCPCS: Performed by: ANESTHESIOLOGY

## 2023-08-09 PROCEDURE — 2580000003 HC RX 258: Performed by: NURSE PRACTITIONER

## 2023-08-09 PROCEDURE — 0FB14ZX EXCISION OF RIGHT LOBE LIVER, PERCUTANEOUS ENDOSCOPIC APPROACH, DIAGNOSTIC: ICD-10-PCS | Performed by: SURGERY

## 2023-08-09 PROCEDURE — 99232 SBSQ HOSP IP/OBS MODERATE 35: CPT | Performed by: INTERNAL MEDICINE

## 2023-08-09 PROCEDURE — 3600000009 HC SURGERY ROBOT BASE: Performed by: SURGERY

## 2023-08-09 PROCEDURE — 2580000003 HC RX 258

## 2023-08-09 PROCEDURE — 6370000000 HC RX 637 (ALT 250 FOR IP): Performed by: INTERNAL MEDICINE

## 2023-08-09 PROCEDURE — 84100 ASSAY OF PHOSPHORUS: CPT

## 2023-08-09 PROCEDURE — 2720000010 HC SURG SUPPLY STERILE: Performed by: SURGERY

## 2023-08-09 PROCEDURE — 2500000003 HC RX 250 WO HCPCS: Performed by: STUDENT IN AN ORGANIZED HEALTH CARE EDUCATION/TRAINING PROGRAM

## 2023-08-09 PROCEDURE — 6360000002 HC RX W HCPCS: Performed by: NURSE ANESTHETIST, CERTIFIED REGISTERED

## 2023-08-09 PROCEDURE — 3700000001 HC ADD 15 MINUTES (ANESTHESIA): Performed by: SURGERY

## 2023-08-09 PROCEDURE — 74018 RADEX ABDOMEN 1 VIEW: CPT

## 2023-08-09 PROCEDURE — 80053 COMPREHEN METABOLIC PANEL: CPT

## 2023-08-09 PROCEDURE — 85610 PROTHROMBIN TIME: CPT

## 2023-08-09 PROCEDURE — 83735 ASSAY OF MAGNESIUM: CPT

## 2023-08-09 PROCEDURE — 7100000000 HC PACU RECOVERY - FIRST 15 MIN: Performed by: SURGERY

## 2023-08-09 PROCEDURE — 88342 IMHCHEM/IMCYTCHM 1ST ANTB: CPT

## 2023-08-09 PROCEDURE — 84145 PROCALCITONIN (PCT): CPT

## 2023-08-09 PROCEDURE — 8E0W4CZ ROBOTIC ASSISTED PROCEDURE OF TRUNK REGION, PERCUTANEOUS ENDOSCOPIC APPROACH: ICD-10-PCS | Performed by: SURGERY

## 2023-08-09 PROCEDURE — 2060000000 HC ICU INTERMEDIATE R&B

## 2023-08-09 PROCEDURE — 2709999900 HC NON-CHARGEABLE SUPPLY: Performed by: SURGERY

## 2023-08-09 PROCEDURE — 99231 SBSQ HOSP IP/OBS SF/LOW 25: CPT | Performed by: INTERNAL MEDICINE

## 2023-08-09 PROCEDURE — 0DJ08ZZ INSPECTION OF UPPER INTESTINAL TRACT, VIA NATURAL OR ARTIFICIAL OPENING ENDOSCOPIC: ICD-10-PCS | Performed by: SURGERY

## 2023-08-09 PROCEDURE — 86140 C-REACTIVE PROTEIN: CPT

## 2023-08-09 PROCEDURE — 82947 ASSAY GLUCOSE BLOOD QUANT: CPT

## 2023-08-09 PROCEDURE — 6360000002 HC RX W HCPCS

## 2023-08-09 PROCEDURE — C9113 INJ PANTOPRAZOLE SODIUM, VIA: HCPCS | Performed by: INTERNAL MEDICINE

## 2023-08-09 PROCEDURE — 2580000003 HC RX 258: Performed by: SURGERY

## 2023-08-09 PROCEDURE — 47001 NDL BIOPSY LVR TM OTH MAJ PX: CPT | Performed by: SURGERY

## 2023-08-09 PROCEDURE — 0D164ZA BYPASS STOMACH TO JEJUNUM, PERCUTANEOUS ENDOSCOPIC APPROACH: ICD-10-PCS | Performed by: SURGERY

## 2023-08-09 PROCEDURE — 83880 ASSAY OF NATRIURETIC PEPTIDE: CPT

## 2023-08-09 PROCEDURE — 94640 AIRWAY INHALATION TREATMENT: CPT

## 2023-08-09 PROCEDURE — C9399 UNCLASSIFIED DRUGS OR BIOLOG: HCPCS | Performed by: NURSE ANESTHETIST, CERTIFIED REGISTERED

## 2023-08-09 PROCEDURE — 94761 N-INVAS EAR/PLS OXIMETRY MLT: CPT

## 2023-08-09 PROCEDURE — 2580000003 HC RX 258: Performed by: INTERNAL MEDICINE

## 2023-08-09 PROCEDURE — 3600000019 HC SURGERY ROBOT ADDTL 15MIN: Performed by: SURGERY

## 2023-08-09 PROCEDURE — 88307 TISSUE EXAM BY PATHOLOGIST: CPT

## 2023-08-09 PROCEDURE — 6360000002 HC RX W HCPCS: Performed by: STUDENT IN AN ORGANIZED HEALTH CARE EDUCATION/TRAINING PROGRAM

## 2023-08-09 PROCEDURE — 80076 HEPATIC FUNCTION PANEL: CPT

## 2023-08-09 PROCEDURE — 99231 SBSQ HOSP IP/OBS SF/LOW 25: CPT | Performed by: SURGERY

## 2023-08-09 PROCEDURE — 88341 IMHCHEM/IMCYTCHM EA ADD ANTB: CPT

## 2023-08-09 PROCEDURE — S2900 ROBOTIC SURGICAL SYSTEM: HCPCS | Performed by: SURGERY

## 2023-08-09 PROCEDURE — 85025 COMPLETE CBC W/AUTO DIFF WBC: CPT

## 2023-08-09 PROCEDURE — 3700000000 HC ANESTHESIA ATTENDED CARE: Performed by: SURGERY

## 2023-08-09 PROCEDURE — 36415 COLL VENOUS BLD VENIPUNCTURE: CPT

## 2023-08-09 RX ORDER — SUCCINYLCHOLINE/SOD CL,ISO/PF 100 MG/5ML
SYRINGE (ML) INTRAVENOUS PRN
Status: DISCONTINUED | OUTPATIENT
Start: 2023-08-09 | End: 2023-08-09 | Stop reason: SDUPTHER

## 2023-08-09 RX ORDER — SODIUM CHLORIDE, SODIUM LACTATE, POTASSIUM CHLORIDE, CALCIUM CHLORIDE 600; 310; 30; 20 MG/100ML; MG/100ML; MG/100ML; MG/100ML
INJECTION, SOLUTION INTRAVENOUS CONTINUOUS
Status: DISCONTINUED | OUTPATIENT
Start: 2023-08-09 | End: 2023-08-10

## 2023-08-09 RX ORDER — FENTANYL CITRATE 50 UG/ML
50 INJECTION, SOLUTION INTRAMUSCULAR; INTRAVENOUS EVERY 5 MIN PRN
Status: DISCONTINUED | OUTPATIENT
Start: 2023-08-09 | End: 2023-08-09 | Stop reason: HOSPADM

## 2023-08-09 RX ORDER — EPHEDRINE SULFATE/0.9% NACL/PF 50 MG/5 ML
SYRINGE (ML) INTRAVENOUS PRN
Status: DISCONTINUED | OUTPATIENT
Start: 2023-08-09 | End: 2023-08-09 | Stop reason: SDUPTHER

## 2023-08-09 RX ORDER — PROPOFOL 10 MG/ML
INJECTION, EMULSION INTRAVENOUS PRN
Status: DISCONTINUED | OUTPATIENT
Start: 2023-08-09 | End: 2023-08-09 | Stop reason: SDUPTHER

## 2023-08-09 RX ORDER — SODIUM CHLORIDE 0.9 % (FLUSH) 0.9 %
5-40 SYRINGE (ML) INJECTION PRN
Status: DISCONTINUED | OUTPATIENT
Start: 2023-08-09 | End: 2023-08-09 | Stop reason: HOSPADM

## 2023-08-09 RX ORDER — HYDRALAZINE HYDROCHLORIDE 20 MG/ML
10 INJECTION INTRAMUSCULAR; INTRAVENOUS EVERY 4 HOURS PRN
Status: DISCONTINUED | OUTPATIENT
Start: 2023-08-09 | End: 2023-08-14

## 2023-08-09 RX ORDER — ONDANSETRON 2 MG/ML
INJECTION INTRAMUSCULAR; INTRAVENOUS PRN
Status: DISCONTINUED | OUTPATIENT
Start: 2023-08-09 | End: 2023-08-09 | Stop reason: SDUPTHER

## 2023-08-09 RX ORDER — ROCURONIUM BROMIDE 10 MG/ML
INJECTION, SOLUTION INTRAVENOUS PRN
Status: DISCONTINUED | OUTPATIENT
Start: 2023-08-09 | End: 2023-08-09 | Stop reason: SDUPTHER

## 2023-08-09 RX ORDER — FENTANYL CITRATE 50 UG/ML
50 INJECTION, SOLUTION INTRAMUSCULAR; INTRAVENOUS
Status: DISCONTINUED | OUTPATIENT
Start: 2023-08-09 | End: 2023-08-10

## 2023-08-09 RX ORDER — ONDANSETRON 2 MG/ML
4 INJECTION INTRAMUSCULAR; INTRAVENOUS
Status: DISCONTINUED | OUTPATIENT
Start: 2023-08-09 | End: 2023-08-09 | Stop reason: HOSPADM

## 2023-08-09 RX ORDER — SODIUM CHLORIDE, SODIUM LACTATE, POTASSIUM CHLORIDE, CALCIUM CHLORIDE 600; 310; 30; 20 MG/100ML; MG/100ML; MG/100ML; MG/100ML
INJECTION, SOLUTION INTRAVENOUS CONTINUOUS PRN
Status: DISCONTINUED | OUTPATIENT
Start: 2023-08-09 | End: 2023-08-09 | Stop reason: SDUPTHER

## 2023-08-09 RX ORDER — NALOXONE HYDROCHLORIDE 0.4 MG/ML
INJECTION, SOLUTION INTRAMUSCULAR; INTRAVENOUS; SUBCUTANEOUS PRN
Status: DISCONTINUED | OUTPATIENT
Start: 2023-08-09 | End: 2023-08-10

## 2023-08-09 RX ORDER — DEXAMETHASONE SODIUM PHOSPHATE 10 MG/ML
INJECTION INTRAMUSCULAR; INTRAVENOUS PRN
Status: DISCONTINUED | OUTPATIENT
Start: 2023-08-09 | End: 2023-08-09 | Stop reason: SDUPTHER

## 2023-08-09 RX ORDER — MIDAZOLAM HYDROCHLORIDE 1 MG/ML
INJECTION INTRAMUSCULAR; INTRAVENOUS
Status: DISCONTINUED
Start: 2023-08-09 | End: 2023-08-09

## 2023-08-09 RX ORDER — LIDOCAINE HYDROCHLORIDE 10 MG/ML
INJECTION, SOLUTION EPIDURAL; INFILTRATION; INTRACAUDAL; PERINEURAL PRN
Status: DISCONTINUED | OUTPATIENT
Start: 2023-08-09 | End: 2023-08-09 | Stop reason: SDUPTHER

## 2023-08-09 RX ORDER — PIPERACILLIN SODIUM, TAZOBACTAM SODIUM 3; .375 G/15ML; G/15ML
INJECTION, POWDER, LYOPHILIZED, FOR SOLUTION INTRAVENOUS PRN
Status: DISCONTINUED | OUTPATIENT
Start: 2023-08-09 | End: 2023-08-09 | Stop reason: SDUPTHER

## 2023-08-09 RX ORDER — SODIUM CHLORIDE 9 MG/ML
INJECTION, SOLUTION INTRAVENOUS PRN
Status: DISCONTINUED | OUTPATIENT
Start: 2023-08-09 | End: 2023-08-09 | Stop reason: HOSPADM

## 2023-08-09 RX ORDER — METHOCARBAMOL 100 MG/ML
1000 INJECTION, SOLUTION INTRAMUSCULAR; INTRAVENOUS EVERY 8 HOURS
Status: DISCONTINUED | OUTPATIENT
Start: 2023-08-09 | End: 2023-08-14

## 2023-08-09 RX ORDER — MIDAZOLAM HYDROCHLORIDE 1 MG/ML
INJECTION INTRAMUSCULAR; INTRAVENOUS PRN
Status: DISCONTINUED | OUTPATIENT
Start: 2023-08-09 | End: 2023-08-09 | Stop reason: SDUPTHER

## 2023-08-09 RX ORDER — METOCLOPRAMIDE HYDROCHLORIDE 5 MG/ML
10 INJECTION INTRAMUSCULAR; INTRAVENOUS EVERY 8 HOURS
Status: DISCONTINUED | OUTPATIENT
Start: 2023-08-09 | End: 2023-08-14

## 2023-08-09 RX ORDER — SODIUM CHLORIDE 0.9 % (FLUSH) 0.9 %
5-40 SYRINGE (ML) INJECTION EVERY 12 HOURS SCHEDULED
Status: DISCONTINUED | OUTPATIENT
Start: 2023-08-09 | End: 2023-08-09 | Stop reason: HOSPADM

## 2023-08-09 RX ORDER — DIPHENHYDRAMINE HYDROCHLORIDE 50 MG/ML
12.5 INJECTION INTRAMUSCULAR; INTRAVENOUS EVERY 6 HOURS PRN
Status: DISCONTINUED | OUTPATIENT
Start: 2023-08-09 | End: 2023-08-14

## 2023-08-09 RX ORDER — MIDAZOLAM HYDROCHLORIDE 2 MG/2ML
2 INJECTION, SOLUTION INTRAMUSCULAR; INTRAVENOUS ONCE
Status: DISCONTINUED | OUTPATIENT
Start: 2023-08-09 | End: 2023-08-11

## 2023-08-09 RX ORDER — FENTANYL CITRATE 50 UG/ML
INJECTION, SOLUTION INTRAMUSCULAR; INTRAVENOUS PRN
Status: DISCONTINUED | OUTPATIENT
Start: 2023-08-09 | End: 2023-08-09 | Stop reason: SDUPTHER

## 2023-08-09 RX ORDER — ACETAMINOPHEN 500 MG
1000 TABLET ORAL EVERY 8 HOURS SCHEDULED
Status: DISCONTINUED | OUTPATIENT
Start: 2023-08-10 | End: 2023-08-10

## 2023-08-09 RX ORDER — FENTANYL CITRATE 50 UG/ML
25 INJECTION, SOLUTION INTRAMUSCULAR; INTRAVENOUS EVERY 5 MIN PRN
Status: DISCONTINUED | OUTPATIENT
Start: 2023-08-09 | End: 2023-08-09 | Stop reason: HOSPADM

## 2023-08-09 RX ORDER — FENTANYL CITRATE 50 UG/ML
INJECTION, SOLUTION INTRAMUSCULAR; INTRAVENOUS
Status: DISCONTINUED
Start: 2023-08-09 | End: 2023-08-09

## 2023-08-09 RX ORDER — ROPIVACAINE HYDROCHLORIDE 2 MG/ML
INJECTION, SOLUTION EPIDURAL; INFILTRATION; PERINEURAL
Status: DISCONTINUED | OUTPATIENT
Start: 2023-08-09 | End: 2023-08-09 | Stop reason: SDUPTHER

## 2023-08-09 RX ADMIN — PIPERACILLIN AND TAZOBACTAM 3.38 G: 3; .375 INJECTION, POWDER, LYOPHILIZED, FOR SOLUTION INTRAVENOUS at 18:16

## 2023-08-09 RX ADMIN — FENTANYL CITRATE 50 MCG: 50 INJECTION, SOLUTION INTRAMUSCULAR; INTRAVENOUS at 20:41

## 2023-08-09 RX ADMIN — METHOCARBAMOL 1000 MG: 100 INJECTION INTRAMUSCULAR; INTRAVENOUS at 22:17

## 2023-08-09 RX ADMIN — ROPIVACAINE HYDROCHLORIDE 50 ML: 2 INJECTION, SOLUTION EPIDURAL; INFILTRATION at 19:38

## 2023-08-09 RX ADMIN — SUGAMMADEX 127 MG: 100 INJECTION, SOLUTION INTRAVENOUS at 19:34

## 2023-08-09 RX ADMIN — SODIUM CHLORIDE, POTASSIUM CHLORIDE, SODIUM LACTATE AND CALCIUM CHLORIDE: 600; 310; 30; 20 INJECTION, SOLUTION INTRAVENOUS at 04:41

## 2023-08-09 RX ADMIN — ROCURONIUM BROMIDE 10 MG: 10 INJECTION, SOLUTION INTRAVENOUS at 18:48

## 2023-08-09 RX ADMIN — DEXAMETHASONE SODIUM PHOSPHATE 10 MG: 10 INJECTION INTRAMUSCULAR; INTRAVENOUS at 17:38

## 2023-08-09 RX ADMIN — Medication 2 G: at 17:53

## 2023-08-09 RX ADMIN — Medication 10 MG: at 18:07

## 2023-08-09 RX ADMIN — PHENYLEPHRINE HYDROCHLORIDE 100 MCG: 10 INJECTION INTRAVENOUS at 17:49

## 2023-08-09 RX ADMIN — PHENYLEPHRINE HYDROCHLORIDE 100 MCG: 10 INJECTION INTRAVENOUS at 19:42

## 2023-08-09 RX ADMIN — PROPOFOL 130 MG: 10 INJECTION, EMULSION INTRAVENOUS at 17:29

## 2023-08-09 RX ADMIN — PHENYLEPHRINE HYDROCHLORIDE 100 MCG: 10 INJECTION INTRAVENOUS at 18:30

## 2023-08-09 RX ADMIN — Medication 10 MG: at 18:48

## 2023-08-09 RX ADMIN — METOCLOPRAMIDE 10 MG: 5 INJECTION, SOLUTION INTRAMUSCULAR; INTRAVENOUS at 22:17

## 2023-08-09 RX ADMIN — LIDOCAINE HYDROCHLORIDE 50 MG: 10 INJECTION, SOLUTION EPIDURAL; INFILTRATION; INTRACAUDAL; PERINEURAL at 17:29

## 2023-08-09 RX ADMIN — TIOTROPIUM BROMIDE INHALATION SPRAY 2 PUFF: 3.12 SPRAY, METERED RESPIRATORY (INHALATION) at 08:45

## 2023-08-09 RX ADMIN — PHENYLEPHRINE HYDROCHLORIDE 200 MCG: 10 INJECTION INTRAVENOUS at 18:22

## 2023-08-09 RX ADMIN — PHENYLEPHRINE HYDROCHLORIDE 50 MCG: 10 INJECTION INTRAVENOUS at 18:26

## 2023-08-09 RX ADMIN — POTASSIUM CHLORIDE 56 ML/HR: 2 INJECTION, SOLUTION, CONCENTRATE INTRAVENOUS at 19:01

## 2023-08-09 RX ADMIN — Medication 100 MG: at 17:29

## 2023-08-09 RX ADMIN — PHENYLEPHRINE HYDROCHLORIDE 200 MCG: 10 INJECTION INTRAVENOUS at 18:33

## 2023-08-09 RX ADMIN — ROCURONIUM BROMIDE 40 MG: 10 INJECTION, SOLUTION INTRAVENOUS at 17:35

## 2023-08-09 RX ADMIN — PHENYLEPHRINE HYDROCHLORIDE 200 MCG: 10 INJECTION INTRAVENOUS at 18:03

## 2023-08-09 RX ADMIN — SODIUM CHLORIDE, PRESERVATIVE FREE 40 MG: 5 INJECTION INTRAVENOUS at 08:40

## 2023-08-09 RX ADMIN — PHENYLEPHRINE HYDROCHLORIDE 100 MCG: 10 INJECTION INTRAVENOUS at 17:59

## 2023-08-09 RX ADMIN — PHENYLEPHRINE HYDROCHLORIDE 100 MCG: 10 INJECTION INTRAVENOUS at 18:50

## 2023-08-09 RX ADMIN — SODIUM CHLORIDE, POTASSIUM CHLORIDE, SODIUM LACTATE AND CALCIUM CHLORIDE: 600; 310; 30; 20 INJECTION, SOLUTION INTRAVENOUS at 22:01

## 2023-08-09 RX ADMIN — MIDAZOLAM 1 MG: 1 INJECTION INTRAMUSCULAR; INTRAVENOUS at 17:27

## 2023-08-09 RX ADMIN — FENTANYL CITRATE 50 MCG: 0.05 INJECTION, SOLUTION INTRAMUSCULAR; INTRAVENOUS at 17:29

## 2023-08-09 RX ADMIN — Medication 10 MG: at 18:11

## 2023-08-09 RX ADMIN — ONDANSETRON 4 MG: 2 INJECTION INTRAMUSCULAR; INTRAVENOUS at 19:16

## 2023-08-09 RX ADMIN — HYDROMORPHONE HYDROCHLORIDE 1 MG: 1 INJECTION, SOLUTION INTRAMUSCULAR; INTRAVENOUS; SUBCUTANEOUS at 04:37

## 2023-08-09 RX ADMIN — ROCURONIUM BROMIDE 20 MG: 10 INJECTION, SOLUTION INTRAVENOUS at 17:59

## 2023-08-09 RX ADMIN — SODIUM CHLORIDE, POTASSIUM CHLORIDE, SODIUM LACTATE AND CALCIUM CHLORIDE: 600; 310; 30; 20 INJECTION, SOLUTION INTRAVENOUS at 18:36

## 2023-08-09 RX ADMIN — SODIUM CHLORIDE, POTASSIUM CHLORIDE, SODIUM LACTATE AND CALCIUM CHLORIDE: 600; 310; 30; 20 INJECTION, SOLUTION INTRAVENOUS at 17:22

## 2023-08-09 RX ADMIN — Medication 10 MG: at 18:36

## 2023-08-09 RX ADMIN — HYDROMORPHONE HYDROCHLORIDE 1 MG: 1 INJECTION, SOLUTION INTRAMUSCULAR; INTRAVENOUS; SUBCUTANEOUS at 08:40

## 2023-08-09 RX ADMIN — HYDROMORPHONE HYDROCHLORIDE 1 MG: 1 INJECTION, SOLUTION INTRAMUSCULAR; INTRAVENOUS; SUBCUTANEOUS at 13:05

## 2023-08-09 RX ADMIN — SODIUM CHLORIDE, PRESERVATIVE FREE 10 ML: 5 INJECTION INTRAVENOUS at 08:46

## 2023-08-09 RX ADMIN — HYDROMORPHONE HYDROCHLORIDE 1 MG: 1 INJECTION, SOLUTION INTRAMUSCULAR; INTRAVENOUS; SUBCUTANEOUS at 00:36

## 2023-08-09 RX ADMIN — ROCURONIUM BROMIDE 10 MG: 10 INJECTION, SOLUTION INTRAVENOUS at 17:29

## 2023-08-09 RX ADMIN — SODIUM CHLORIDE, PRESERVATIVE FREE 10 ML: 5 INJECTION INTRAVENOUS at 08:45

## 2023-08-09 RX ADMIN — SODIUM CHLORIDE, PRESERVATIVE FREE 10 ML: 5 INJECTION INTRAVENOUS at 13:06

## 2023-08-09 ASSESSMENT — PAIN DESCRIPTION - LOCATION
LOCATION: ABDOMEN

## 2023-08-09 ASSESSMENT — ENCOUNTER SYMPTOMS
ABDOMINAL PAIN: 1
EYES NEGATIVE: 1
ALLERGIC/IMMUNOLOGIC NEGATIVE: 1
RESPIRATORY NEGATIVE: 1

## 2023-08-09 ASSESSMENT — PAIN SCALES - GENERAL
PAINLEVEL_OUTOF10: 10
PAINLEVEL_OUTOF10: 5
PAINLEVEL_OUTOF10: 9
PAINLEVEL_OUTOF10: 10

## 2023-08-09 ASSESSMENT — PAIN - FUNCTIONAL ASSESSMENT: PAIN_FUNCTIONAL_ASSESSMENT: 0-10

## 2023-08-09 ASSESSMENT — PAIN DESCRIPTION - ORIENTATION: ORIENTATION: RIGHT;LEFT;UPPER

## 2023-08-09 ASSESSMENT — PAIN DESCRIPTION - DESCRIPTORS: DESCRIPTORS: ACHING

## 2023-08-09 NOTE — PROGRESS NOTES
I saw and evaluated the patient, performing the key elements of the service. I discussed the findings, assessment and plan with the nurse practitioner/resident and agree with the their findings and plan as documented in the their note. Plan for robotic GJ, EGD, preop TAP block today  Cleared by cards  UGI with GOO  PE on AC, Hold heparin for OR today, s/p IVC filter 8/8  High NGT output  PPI  TPN     Time spent caring for patient 25 minutes    I spent greater than 50% of the visit coordinating care and answering all questions from patient, and family members, reviewing the possible outcome of the treatment. Reji Etienne MD DABS  Surgical Oncology/HPB Surgery  [facility]  [address]  [facility],[place] [id number]  Office:  [phone]  Fax:  [phone]  8/9/2023  8:24 AM

## 2023-08-09 NOTE — PROGRESS NOTES
Physical Therapy        Physical Therapy Cancel Note      DATE: 2023    NAME: Ruy Peterson  MRN: [id number]   : 1965      Patient not seen this date for Physical Therapy due to:    Patient Declined: denies need for PT intervention states up ad fabiola no decline in ability, no DME needs and has a good support system. PT to sign off. Nurse notified.       Electronically signed by Paul Almanza PT on 2023 at 1:26 PM

## 2023-08-09 NOTE — OP NOTE
DATE OF PROCEDURE: 08/09/23      PATIENT NAME: Bev Zaragoza  MEDICAL RECORD NUMBER: [id number]    PREOPERATIVE DIAGNOSES:  1. Locally advanced pancreatic cancer  2. Gastric outlet obstruction  3. Gastroesophageal reflux disease    POSTOPERATIVE DIAGNOSES:  Same as above. NAME OF PROCEDURE:  1. Diagnostic laparoscopy (cpt 76704)  2. Robot assisted gastrojejunostomy (cpt 52137)  3. Liver biopsy x2 (cpt 77625)  4. Esophagogastroduodenoscopy     SURGEON: MD JOAQUIN Stewart     ASSISTANT: Kandy licensed first assistant     ANESTHESIA: General.     ANESTHESIOLOGIST: Pritesh Tsai MD     ESTIMATED BLOOD LOSS: 10mL     INTRAOPERATIVE FLUIDS: 2 L of crystalloid     URINE OUTPUT: mL. PREOPERATIVE INDICATIONS: This is 80-year-old male who is presenting with locally advanced pancreatic cancer with gastric outlet obstruction. I counseled the patient for diagnosed laparoscopy and robotic gastrojejunostomy. All benefits, risks, and alternatives were explained to the patient in details and informed consent was obtained. The patient understood the risk of leak, bleeding, pneumonia, stroke, heart attack and death, and agreed to proceed. DETAILS OF PROCEDURE: The patient was brought to the operating room, and he was placed supine in the operating table with appropriate padding. Sequential compressive devices were applied on bilateral lower extremities. Anesthesia was induced per the anesthesia team via endotracheal intubation. Appropriate lines were placed by anesthesia. Loera was placed under aseptic technique. Preoperative antibiotics were given. The patient was prepped and draped in standard sterile technique for the procedure. Timeout was done verifying the correct patient site and procedure. A small supraumbilical incision was made. Using a 5mm optical trocar, visualizing all layers of the abdominal wall, the peritoneal cavity was entered. Pneumoperitoneum was achieved.  There was no trocar injury 8/9/2023 1816        DICTATED BY:  Reji Etienne MD DABS  Surgical Oncology/HPB Surgery  [facility]  [address]  [address]  Office:  [phone]  Fax:  [phone]  08/09/23   7:34 PM     CC: Federico Bajwa MD

## 2023-08-09 NOTE — CARE COORDINATION
Transitional planning    Call to [facility] , spoke with Willma Milan, they have accepted , plan is home , having GJ tube placed today, [facility] has accepted on TPN. Call from Willma Milan, need to know patients PCP, patient off unit and no info found in epic, will discuss when he returns if time allows.

## 2023-08-09 NOTE — PROGRESS NOTES
8/9/2023 0935  Gross per 24 hour   Intake 2798.26 ml   Output 4210 ml   Net -1411.74 ml       Microbiology:  No results for input(s): SPECDESC, SPECDESC, SPECIAL, CULTURE, CULTURE, STATUS, ORG, CDIFFTOXPCR, CAMPYLOBPCR, SALMONELLAPC, SHIGAPCR, SHIGELLAPCR, MPNEUG, MPNEUM, LACTOQL in the last 72 hours. Pathology:    Radiology reports:  XR ABDOMEN FOR NG/OG/NE TUBE PLACEMENT   Final Result   Enteric tube courses below the GE junction with the side port at the level of   the GE junction. Consideration for advancement by 5 cm recommended. .         IR GUIDED IVC FILTER PLACEMENT   Final Result   Successful permanent vena cava filter placement, as above. PLAN:   Retrieval intent (MIPS): The filter is not retrievable. FL UGI   Final Result   Contrast does not empty into the duodenum concerning for a degree of   obstruction. Follow-up abdominal radiographs may be helpful to assess   contrast movement. XR ABDOMEN FOR NG/OG/NE TUBE PLACEMENT   Final Result   Gastric tube with the tip in the gastric fundus. XR ABDOMEN (KUB) (SINGLE AP VIEW)   Final Result   Nonspecific bowel gas pattern without evidence for obstruction. CT CHEST PULMONARY EMBOLISM W CONTRAST   Final Result   Short segment pulmonary embolism not resulting in obstruction and not   considered to be flow limiting in a single mid to distal branch to the   posteromedial left lung base. Background of moderate emphysema. Marked gastric distension suggesting gastric outlet obstruction. Pneumobilia noted in the mid to anterior liver. CT ABDOMEN PELVIS W IV CONTRAST Additional Contrast? None   Final Result   Marked gastric distension consistent with gastric outlet obstruction.       There appears to be a large ill-defined pancreatic head mass which cannot be   accurately evaluated by this exam.      Interim placement of common duct biliary stent since the comparison study of   07/21/2023 with note of hospitalization. Will use albuterol as needed. Encourage incentive spirometry, pulmonary toilet, aspiration precautions and bronchodilators   On NG tube suction/NG drainage  On TPN  Antimicrobials reviewed; currently off antibiotic  DVT prophylaxis he is on therapeutic anticoagulation  Physical/occupational therapy; increase activity as tolerated. I updated the patient regarding the current clinical condition, provisional diagnosis and management plan. I addressed concerns and answered all questions to the best of my abilities. It was my pleasure to evaluate [employer] today. We will continue to follow. I would like to thank you for allowing me to participate in the care of this patient. Please feel free to call with any further questions or concerns. Olivia Burris MD, M.D. Pulmonary and Critical Care Medicine           8/9/2023, 10:20 AM    Please note that this chart was generated using voice recognition Dragon dictation software. Although every effort was made to ensure the accuracy of this automated transcription, some errors in transcription may have occurred.

## 2023-08-09 NOTE — PROGRESS NOTES
Today's Date: 8/8/2023  Patient Name: Pal Naranjo  Date of admission: 8/5/2023  9:40 PM  Patient's age: 66 y.o., 1965  Admission Dx: Shortness of breath [R06.02]  Other chest pain [R07.89]  Gastric outlet obstruction [K31.1]  Left pulmonary embolus ([address]) [I26.99]  Single subsegmental pulmonary embolism without acute cor pulmonale ([address]) [I26.93]    Reason for Consult: management recommendations  Requesting Physician: No admitting provider for patient encounter. CHIEF COMPLAINT:  abd pain     Interim history  The patient is seen and evaluated. NG tube in place. He is on TPN on heparin and tolerating the combination well. Surgical plan tomorrow appreciated  HISTORY OF PRESENT ILLNESS:      The patient is a 66 y.o.  male who is admitted to the hospital for worsening abdominal pain. He is known to us with recently diagnosed pancreatic cancer. Presenting with abdominal pain, obstructive jaundice and nausea and vomiting. CT scan showed suspicious appearing mass at the head of pancreas that was confirmed by you EUS. Biopsy showed adenocarcinoma and clinical stage was T3 N0 M0. The patient underwent stenting of the common bile duct and was treated with antibiotics for pneumonia and was discharged. Now he comes in with worsening abdominal pain. CT scan showed incidentally a segmental pulmonary embolus in the left posterior medial base. No obstruction or flow limitation was appreciated. CT abdomen pelvis showed a pancreatic mass which was identified previously. The stent has good localization. It was felt that his nausea and vomiting and abdominal discomfort is due to gastric outlet obstruction. NG tube was inserted by the surgical team and they are considering gastrojejunostomy bypass.   Past Medical History:   has a past medical history of Abdominal pain, COVID-19 vaccine series completed, Duodenitis, Elevated CEA, Fatty liver, GERD (gastroesophageal reflux disease), History of

## 2023-08-09 NOTE — PROGRESS NOTES
Today's Date: 8/9/2023  Patient Name: Srikanth Gamboa  Date of admission: 8/5/2023  9:40 PM  Patient's age: 66 y.o., 1965  Admission Dx: Shortness of breath [R06.02]  Other chest pain [R07.89]  Gastric outlet obstruction [K31.1]  Left pulmonary embolus ([address]) [I26.99]  Single subsegmental pulmonary embolism without acute cor pulmonale ([address]) [I26.93]    Reason for Consult: management recommendations  Requesting Physician: No admitting provider for patient encounter. CHIEF COMPLAINT:  abd pain     Interim history  The patient is seen and evaluated. NG tube in place. He is on TPN on heparin and tolerating the combination well. Surgical plan underway   HISTORY OF PRESENT ILLNESS:      The patient is a 66 y.o.  male who is admitted to the hospital for worsening abdominal pain. He is known to us with recently diagnosed pancreatic cancer. Presenting with abdominal pain, obstructive jaundice and nausea and vomiting. CT scan showed suspicious appearing mass at the head of pancreas that was confirmed by you EUS. Biopsy showed adenocarcinoma and clinical stage was T3 N0 M0. The patient underwent stenting of the common bile duct and was treated with antibiotics for pneumonia and was discharged. Now he comes in with worsening abdominal pain. CT scan showed incidentally a segmental pulmonary embolus in the left posterior medial base. No obstruction or flow limitation was appreciated. CT abdomen pelvis showed a pancreatic mass which was identified previously. The stent has good localization. It was felt that his nausea and vomiting and abdominal discomfort is due to gastric outlet obstruction. NG tube was inserted by the surgical team and they are considering gastrojejunostomy bypass.   Past Medical History:   has a past medical history of Abdominal pain, COVID-19 vaccine series completed, Duodenitis, Elevated CEA, Fatty liver, GERD (gastroesophageal reflux disease), History of recent

## 2023-08-09 NOTE — PLAN OF CARE
BRONCHOSPASM/BRONCHOCONSTRICTION     [x]         IMPROVE AERATION/BREATH SOUNDS  [x]   ADMINISTER BRONCHODILATOR THERAPY AS APPROPRIATE  [x]   ASSESS BREATH SOUNDS  [x]   IMPLEMENT AEROSOL/MDI PROTOCOL  [x]   PATIENT EDUCATION AS NEEDED     Problem: Respiratory - Adult  Goal: Achieves optimal ventilation and oxygenation  Outcome: Progressing

## 2023-08-09 NOTE — PROGRESS NOTES
Comprehensive Nutrition Assessment    Type and Reason for Visit:  Reassess    Nutrition Recommendations/Plan:   Continue TPN. Increase to 55 mL/hr with 90 gm AA and 225 gm Dextrose. Increase to 250 mL IL. Provides 1625 kcal and 90 gm protein  Add MVI to next bag. Monitor labs and adjust TPN as needed     Malnutrition Assessment:  Malnutrition Status:  Severe malnutrition (08/06/23 1117)    Context:  Chronic Illness     Findings of the 6 clinical characteristics of malnutrition:  Energy Intake:  75% or less estimated energy requirements for 1 month or longer  Weight Loss:  Greater than 20% over 1 year     Body Fat Loss:   (Moderate to severe) Orbital, Triceps   Muscle Mass Loss:   (Moderate to severe) Temples (temporalis), Clavicles (pectoralis & deltoids), Hand (interosseous)  Fluid Accumulation:  No significant fluid accumulation     Strength:  Not Performed    Nutrition Assessment:    Patient seen for follow up. Continue TPN/IL. Plan for [address] placement today. NGT output 1.8 L x 24 hours. glucose 110-118. Meds reviewed. Nutrition Related Findings:    meds/labs reviewed; taste changes, nausea, abd pain; NGT to LIS Wound Type: None       Current Nutrition Intake & Therapies:    Average Meal Intake: NPO  Average Supplements Intake: NPO  Current Parenteral Nutrition Orders:  Type and Formula: 2-in-1 Standard (200 gm dextrose and 90 gm AA)   Lipids: 250ml, Daily  Duration: Continuous  Rate/Volume: 53 mL/hr  Current PN Order Provides: 1240 kcal and 90 gm protein. Goal PN Orders Provides:      Anthropometric Measures:  Height: 5' 9\" (175.3 cm)  Ideal Body Weight (IBW): 160 lbs (73 kg)       Current Body Weight: 140 lb 3.4 oz (63.6 kg), 87.6 % IBW. Weight Source: Standing Scale  Current BMI (kg/m2): 20.7  Usual Body Weight: 176 lb (79.8 kg) (pt report)  % Weight Change (Calculated): -23.6                    BMI Categories: Normal Weight (BMI 18.5-24. 9)    Estimated Daily Nutrient Needs:  Energy Requirements Based

## 2023-08-09 NOTE — PROGRESS NOTES
Occupational [address]  Occupational Therapy Not Seen Note    DATE: 2023    NAME: Vivinae Blanchard  MRN: [id number]   : 1965      Patient not seen this date for Occupational Therapy due to:    Surgery/Procedure: ROBOTIC LAPAROSCOPIC GASTROJEJUNOSTOMY TUBE, POSSIBLE OPEN, SURGICAL EGD    Next Scheduled Treatment: 08/10/2023    Electronically signed by Rocky Pineda OTR/L on 2023 at 9:48 AM

## 2023-08-09 NOTE — PLAN OF CARE
Problem: Discharge Planning  Goal: Discharge to home or other facility with appropriate resources  8/8/2023 2203 by Fartun Huber RN  Outcome: Progressing  8/8/2023 1649 by Becky Blackburn RN  Flowsheets (Taken 8/7/2023 1837 by Chad Olsen RN)  Discharge to home or other facility with appropriate resources:   Identify barriers to discharge with patient and caregiver   Arrange for needed discharge resources and transportation as appropriate   Identify discharge learning needs (meds, wound care, etc)     Problem: Pain  Goal: Verbalizes/displays adequate comfort level or baseline comfort level  8/8/2023 2203 by Fartun Huber RN  Outcome: Progressing  8/8/2023 1649 by Becky Blackburn RN  Flowsheets (Taken 8/6/2023 1200 by Barry Sanchez RN)  Verbalizes/displays adequate comfort level or baseline comfort level:   Encourage patient to monitor pain and request assistance   Assess pain using appropriate pain scale   Administer analgesics based on type and severity of pain and evaluate response     Problem: ABCDS Injury Assessment  Goal: Absence of physical injury  8/8/2023 2203 by Fartun Huber RN  Outcome: Progressing  8/8/2023 1649 by Becky Blackburn RN  Flowsheets (Taken 8/7/2023 1837 by Chad Olsen RN)  Absence of Physical Injury: Implement safety measures based on patient assessment     Problem: Safety - Adult  Goal: Free from fall injury  8/8/2023 2203 by Fartun Huber RN  Outcome: Progressing  8/8/2023 1649 by Becky Blackburn RN  Flowsheets (Taken 8/7/2023 1837 by Chad Olsen RN)  Free From Fall Injury: Instruct family/caregiver on patient safety     Problem: Nutrition Deficit:  Goal: Optimize nutritional status  Outcome: Progressing     Problem: Skin/Tissue Integrity  Goal: Absence of new skin breakdown  Description: 1. Monitor for areas of redness and/or skin breakdown  2. Assess vascular access sites hourly  3.   Every 4-6 hours minimum:  Change oxygen

## 2023-08-09 NOTE — PROGRESS NOTES
emphysema. Marked gastric distension suggesting gastric outlet obstruction. Pneumobilia noted in the mid to anterior liver. XR ABDOMEN FOR NG/OG/NE TUBE PLACEMENT    Result Date: 8/6/2023  Gastric tube with the tip in the gastric fundus. FL UGI    Result Date: 8/6/2023  Contrast does not empty into the duodenum concerning for a degree of obstruction. Follow-up abdominal radiographs may be helpful to assess contrast movement. Physical Examination:     General appearance:  alert, cooperative and no distress, NG tube in place  Mental Status:  oriented to person, place and time and normal affect  Lungs:  clear to auscultation bilaterally, normal effort  Heart:  regular rate and rhythm, no murmur  Abdomen:  soft, nontender, nondistended, normal bowel sounds, no masses, hepatomegaly, splenomegaly  Extremities:  no edema, redness, tenderness in the calves  Skin:  no gross lesions, rashes, induration    Assessment:     Hospital Problems             Last Modified POA    * (Principal) Single subsegmental pulmonary embolism without acute cor pulmonale ([address]) 8/6/2023 Yes    GERD (gastroesophageal reflux disease) 8/6/2023 Yes    Pancreatic mass 8/6/2023 Yes    Pancreatic adenocarcinoma ([address]) 8/6/2023 Yes    Gastric outlet obstruction 8/6/2023 Yes    Hypokalemia 8/6/2023 Yes    Anemia 8/6/2023 Yes    Chest pain 8/6/2023 Yes    Dizziness 8/6/2023 Yes    Dyspnea 8/6/2023 Yes    Cancer of head of pancreas ([address]) 8/6/2023 Yes    Severe malnutrition (HCC) (Chronic) 8/6/2023 Yes    Preoperative clearance 8/7/2023 Yes    Pain 8/7/2023 Yes    Nausea 8/7/2023 Yes    ACP (advance care planning) 8/7/2023 Yes    Encounter for palliative care 8/7/2023 Yes    Goals of care, counseling/discussion 8/7/2023 Yes    Malignant neoplasm of head of pancreas ([address]) 8/7/2023 Yes    Centrilobular emphysema ([address]) 8/8/2023 Yes    Has been smoking for 30 years 8/8/2023 Yes     Plan:     Short segment pulmonary embolism   Troponin negative x2.

## 2023-08-10 LAB
ALBUMIN SERPL-MCNC: 3.5 G/DL (ref 3.5–5.2)
ALBUMIN/GLOB SERPL: 1.1 {RATIO} (ref 1–2.5)
ALP SERPL-CCNC: 129 U/L (ref 40–129)
ALT SERPL-CCNC: 19 U/L (ref 5–41)
ANION GAP SERPL CALCULATED.3IONS-SCNC: 14 MMOL/L (ref 9–17)
AST SERPL-CCNC: 18 U/L
BASOPHILS # BLD: <0.03 K/UL (ref 0–0.2)
BASOPHILS NFR BLD: 0 % (ref 0–2)
BILIRUB SERPL-MCNC: 0.3 MG/DL (ref 0.3–1.2)
BNP SERPL-MCNC: 67 PG/ML
BUN SERPL-MCNC: 18 MG/DL (ref 6–20)
CALCIUM SERPL-MCNC: 9.5 MG/DL (ref 8.6–10.4)
CHLORIDE SERPL-SCNC: 104 MMOL/L (ref 98–107)
CO2 SERPL-SCNC: 20 MMOL/L (ref 20–31)
CREAT SERPL-MCNC: 0.8 MG/DL (ref 0.7–1.2)
CRP SERPL HS-MCNC: 12.5 MG/L (ref 0–5)
EOSINOPHIL # BLD: <0.03 K/UL (ref 0–0.44)
EOSINOPHILS RELATIVE PERCENT: 0 % (ref 1–4)
ERYTHROCYTE [DISTWIDTH] IN BLOOD BY AUTOMATED COUNT: 13.6 % (ref 11.8–14.4)
GFR SERPL CREATININE-BSD FRML MDRD: >60 ML/MIN/1.73M2
GLUCOSE BLD-MCNC: 103 MG/DL (ref 75–110)
GLUCOSE BLD-MCNC: 146 MG/DL (ref 75–110)
GLUCOSE SERPL-MCNC: 209 MG/DL (ref 70–99)
HCT VFR BLD AUTO: 39.5 % (ref 40.7–50.3)
HGB BLD-MCNC: 12.5 G/DL (ref 13–17)
IMM GRANULOCYTES # BLD AUTO: 0.04 K/UL (ref 0–0.3)
IMM GRANULOCYTES NFR BLD: 0 %
INR PPP: 1
LYMPHOCYTES NFR BLD: 0.73 K/UL (ref 1.1–3.7)
LYMPHOCYTES RELATIVE PERCENT: 7 % (ref 24–43)
MAGNESIUM SERPL-MCNC: 2.1 MG/DL (ref 1.6–2.6)
MCH RBC QN AUTO: 28.5 PG (ref 25.2–33.5)
MCHC RBC AUTO-ENTMCNC: 31.6 G/DL (ref 28.4–34.8)
MCV RBC AUTO: 90 FL (ref 82.6–102.9)
METER GLUCOSE: 151 MG/DL (ref 75–110)
METER GLUCOSE: 179 MG/DL (ref 75–110)
METER GLUCOSE: 223 MG/DL (ref 75–110)
MONOCYTES NFR BLD: 0.6 K/UL (ref 0.1–1.2)
MONOCYTES NFR BLD: 5 % (ref 3–12)
NEUTROPHILS NFR BLD: 88 % (ref 36–65)
NEUTS SEG NFR BLD: 9.62 K/UL (ref 1.5–8.1)
NRBC BLD-RTO: 0 PER 100 WBC
PHOSPHATE SERPL-MCNC: 3 MG/DL (ref 2.5–4.5)
PLATELET # BLD AUTO: 481 K/UL (ref 138–453)
PMV BLD AUTO: 9 FL (ref 8.1–13.5)
POTASSIUM SERPL-SCNC: 4.9 MMOL/L (ref 3.7–5.3)
PROCALCITONIN SERPL-MCNC: 0.14 NG/ML
PROT SERPL-MCNC: 6.7 G/DL (ref 6.4–8.3)
PROTHROMBIN TIME: 13.3 SEC (ref 11.7–14.9)
RBC # BLD AUTO: 4.39 M/UL (ref 4.21–5.77)
SODIUM SERPL-SCNC: 138 MMOL/L (ref 135–144)
WBC OTHER # BLD: 11 K/UL (ref 3.5–11.3)

## 2023-08-10 PROCEDURE — 2500000003 HC RX 250 WO HCPCS: Performed by: SURGERY

## 2023-08-10 PROCEDURE — 6360000002 HC RX W HCPCS: Performed by: SURGERY

## 2023-08-10 PROCEDURE — 94640 AIRWAY INHALATION TREATMENT: CPT

## 2023-08-10 PROCEDURE — 94761 N-INVAS EAR/PLS OXIMETRY MLT: CPT

## 2023-08-10 PROCEDURE — 84145 PROCALCITONIN (PCT): CPT

## 2023-08-10 PROCEDURE — 2060000000 HC ICU INTERMEDIATE R&B

## 2023-08-10 PROCEDURE — 2580000003 HC RX 258: Performed by: SURGERY

## 2023-08-10 PROCEDURE — 6360000002 HC RX W HCPCS: Performed by: NURSE PRACTITIONER

## 2023-08-10 PROCEDURE — APPSS45 APP SPLIT SHARED TIME 31-45 MINUTES: Performed by: NURSE PRACTITIONER

## 2023-08-10 PROCEDURE — 84100 ASSAY OF PHOSPHORUS: CPT

## 2023-08-10 PROCEDURE — 83880 ASSAY OF NATRIURETIC PEPTIDE: CPT

## 2023-08-10 PROCEDURE — 80053 COMPREHEN METABOLIC PANEL: CPT

## 2023-08-10 PROCEDURE — 99232 SBSQ HOSP IP/OBS MODERATE 35: CPT | Performed by: INTERNAL MEDICINE

## 2023-08-10 PROCEDURE — 86140 C-REACTIVE PROTEIN: CPT

## 2023-08-10 PROCEDURE — 6370000000 HC RX 637 (ALT 250 FOR IP): Performed by: INTERNAL MEDICINE

## 2023-08-10 PROCEDURE — 36415 COLL VENOUS BLD VENIPUNCTURE: CPT

## 2023-08-10 PROCEDURE — 6370000000 HC RX 637 (ALT 250 FOR IP): Performed by: SURGERY

## 2023-08-10 PROCEDURE — 83735 ASSAY OF MAGNESIUM: CPT

## 2023-08-10 PROCEDURE — 83036 HEMOGLOBIN GLYCOSYLATED A1C: CPT

## 2023-08-10 PROCEDURE — 85610 PROTHROMBIN TIME: CPT

## 2023-08-10 PROCEDURE — C9113 INJ PANTOPRAZOLE SODIUM, VIA: HCPCS | Performed by: SURGERY

## 2023-08-10 PROCEDURE — 82947 ASSAY GLUCOSE BLOOD QUANT: CPT

## 2023-08-10 PROCEDURE — 2580000003 HC RX 258: Performed by: NURSE PRACTITIONER

## 2023-08-10 PROCEDURE — 99233 SBSQ HOSP IP/OBS HIGH 50: CPT | Performed by: INTERNAL MEDICINE

## 2023-08-10 PROCEDURE — 85025 COMPLETE CBC W/AUTO DIFF WBC: CPT

## 2023-08-10 PROCEDURE — 99024 POSTOP FOLLOW-UP VISIT: CPT | Performed by: SURGERY

## 2023-08-10 PROCEDURE — 6370000000 HC RX 637 (ALT 250 FOR IP): Performed by: NURSE PRACTITIONER

## 2023-08-10 PROCEDURE — 99231 SBSQ HOSP IP/OBS SF/LOW 25: CPT | Performed by: INTERNAL MEDICINE

## 2023-08-10 RX ORDER — INSULIN GLARGINE 100 [IU]/ML
5 INJECTION, SOLUTION SUBCUTANEOUS DAILY
Status: DISCONTINUED | OUTPATIENT
Start: 2023-08-10 | End: 2023-08-14

## 2023-08-10 RX ORDER — FENTANYL CITRATE 50 UG/ML
50 INJECTION, SOLUTION INTRAMUSCULAR; INTRAVENOUS
Status: DISCONTINUED | OUTPATIENT
Start: 2023-08-10 | End: 2023-08-11

## 2023-08-10 RX ORDER — INSULIN LISPRO 100 [IU]/ML
0-4 INJECTION, SOLUTION INTRAVENOUS; SUBCUTANEOUS EVERY 6 HOURS
Status: DISCONTINUED | OUTPATIENT
Start: 2023-08-10 | End: 2023-08-14

## 2023-08-10 RX ORDER — DEXTROSE MONOHYDRATE 100 MG/ML
INJECTION, SOLUTION INTRAVENOUS CONTINUOUS PRN
Status: DISCONTINUED | OUTPATIENT
Start: 2023-08-10 | End: 2023-08-14 | Stop reason: HOSPADM

## 2023-08-10 RX ORDER — FENTANYL CITRATE 50 UG/ML
25 INJECTION, SOLUTION INTRAMUSCULAR; INTRAVENOUS
Status: DISCONTINUED | OUTPATIENT
Start: 2023-08-10 | End: 2023-08-11

## 2023-08-10 RX ORDER — SODIUM CHLORIDE 9 MG/ML
INJECTION, SOLUTION INTRAVENOUS CONTINUOUS
Status: DISCONTINUED | OUTPATIENT
Start: 2023-08-10 | End: 2023-08-12

## 2023-08-10 RX ORDER — ENOXAPARIN SODIUM 100 MG/ML
40 INJECTION SUBCUTANEOUS DAILY
Status: DISCONTINUED | OUTPATIENT
Start: 2023-08-10 | End: 2023-08-11

## 2023-08-10 RX ADMIN — POTASSIUM CHLORIDE: 2 INJECTION, SOLUTION, CONCENTRATE INTRAVENOUS at 18:23

## 2023-08-10 RX ADMIN — PIPERACILLIN AND TAZOBACTAM 3375 MG: 3; .375 INJECTION, POWDER, LYOPHILIZED, FOR SOLUTION INTRAVENOUS at 12:14

## 2023-08-10 RX ADMIN — METHOCARBAMOL 1000 MG: 100 INJECTION INTRAMUSCULAR; INTRAVENOUS at 20:36

## 2023-08-10 RX ADMIN — METHOCARBAMOL 1000 MG: 100 INJECTION INTRAMUSCULAR; INTRAVENOUS at 05:06

## 2023-08-10 RX ADMIN — METOCLOPRAMIDE 10 MG: 5 INJECTION, SOLUTION INTRAMUSCULAR; INTRAVENOUS at 13:59

## 2023-08-10 RX ADMIN — ENOXAPARIN SODIUM 40 MG: 100 INJECTION SUBCUTANEOUS at 08:56

## 2023-08-10 RX ADMIN — I.V. FAT EMULSION 250 ML: 20 EMULSION INTRAVENOUS at 18:23

## 2023-08-10 RX ADMIN — SODIUM CHLORIDE, PRESERVATIVE FREE 40 MG: 5 INJECTION INTRAVENOUS at 20:36

## 2023-08-10 RX ADMIN — FENTANYL CITRATE 50 MCG: 50 INJECTION, SOLUTION INTRAMUSCULAR; INTRAVENOUS at 15:32

## 2023-08-10 RX ADMIN — FENTANYL CITRATE 50 MCG: 50 INJECTION, SOLUTION INTRAMUSCULAR; INTRAVENOUS at 21:19

## 2023-08-10 RX ADMIN — METOCLOPRAMIDE 10 MG: 5 INJECTION, SOLUTION INTRAMUSCULAR; INTRAVENOUS at 20:35

## 2023-08-10 RX ADMIN — FENTANYL CITRATE 50 MCG: 50 INJECTION, SOLUTION INTRAMUSCULAR; INTRAVENOUS at 23:57

## 2023-08-10 RX ADMIN — PIPERACILLIN AND TAZOBACTAM 3375 MG: 3; .375 INJECTION, POWDER, LYOPHILIZED, FOR SOLUTION INTRAVENOUS at 18:31

## 2023-08-10 RX ADMIN — METHOCARBAMOL 1000 MG: 100 INJECTION INTRAMUSCULAR; INTRAVENOUS at 13:59

## 2023-08-10 RX ADMIN — SODIUM CHLORIDE: 9 INJECTION, SOLUTION INTRAVENOUS at 15:18

## 2023-08-10 RX ADMIN — METOCLOPRAMIDE 10 MG: 5 INJECTION, SOLUTION INTRAMUSCULAR; INTRAVENOUS at 05:06

## 2023-08-10 RX ADMIN — INSULIN LISPRO 1 UNITS: 100 INJECTION, SOLUTION INTRAVENOUS; SUBCUTANEOUS at 01:07

## 2023-08-10 RX ADMIN — PIPERACILLIN AND TAZOBACTAM 3375 MG: 3; .375 INJECTION, POWDER, LYOPHILIZED, FOR SOLUTION INTRAVENOUS at 05:24

## 2023-08-10 RX ADMIN — NALOXEGOL OXALATE 25 MG: 12.5 TABLET, FILM COATED ORAL at 05:06

## 2023-08-10 RX ADMIN — SODIUM CHLORIDE, PRESERVATIVE FREE 40 MG: 5 INJECTION INTRAVENOUS at 08:56

## 2023-08-10 RX ADMIN — FENTANYL CITRATE 50 MCG: 50 INJECTION, SOLUTION INTRAMUSCULAR; INTRAVENOUS at 10:25

## 2023-08-10 RX ADMIN — SODIUM CHLORIDE, PRESERVATIVE FREE 10 ML: 5 INJECTION INTRAVENOUS at 08:58

## 2023-08-10 RX ADMIN — INSULIN GLARGINE 5 UNITS: 100 INJECTION, SOLUTION SUBCUTANEOUS at 11:05

## 2023-08-10 RX ADMIN — PIPERACILLIN AND TAZOBACTAM 3375 MG: 3; .375 INJECTION, POWDER, LYOPHILIZED, FOR SOLUTION INTRAVENOUS at 00:01

## 2023-08-10 RX ADMIN — TIOTROPIUM BROMIDE INHALATION SPRAY 2 PUFF: 3.12 SPRAY, METERED RESPIRATORY (INHALATION) at 09:54

## 2023-08-10 RX ADMIN — ACETAMINOPHEN 1000 MG: 500 TABLET ORAL at 05:06

## 2023-08-10 ASSESSMENT — PAIN SCALES - GENERAL
PAINLEVEL_OUTOF10: 8
PAINLEVEL_OUTOF10: 8
PAINLEVEL_OUTOF10: 4
PAINLEVEL_OUTOF10: 7
PAINLEVEL_OUTOF10: 8
PAINLEVEL_OUTOF10: 10
PAINLEVEL_OUTOF10: 7
PAINLEVEL_OUTOF10: 8
PAINLEVEL_OUTOF10: 8

## 2023-08-10 ASSESSMENT — PAIN DESCRIPTION - DESCRIPTORS
DESCRIPTORS: ACHING;DISCOMFORT
DESCRIPTORS: ACHING
DESCRIPTORS: ACHING;DISCOMFORT
DESCRIPTORS: DISCOMFORT;ACHING;PRESSURE

## 2023-08-10 ASSESSMENT — PAIN DESCRIPTION - ORIENTATION
ORIENTATION: RIGHT;LEFT;ANTERIOR
ORIENTATION: ANTERIOR

## 2023-08-10 ASSESSMENT — PAIN DESCRIPTION - LOCATION
LOCATION: ABDOMEN

## 2023-08-10 ASSESSMENT — ENCOUNTER SYMPTOMS
EYES NEGATIVE: 1
ALLERGIC/IMMUNOLOGIC NEGATIVE: 1
ABDOMINAL PAIN: 1
RESPIRATORY NEGATIVE: 1

## 2023-08-10 NOTE — PROGRESS NOTES
[facility]  Occupational Therapy Not Seen Note    DATE: 8/10/2023    NAME: Ruy Peterson  MRN: [id number]   : 1965      Patient not seen this date for Occupational Therapy due to: Other: Pt declines OT evaluation at this time however states plan to ambulate and wash up later today for first time since surgery and requests OT check back tomorrow to see if pt still feels they are IND.  OT will check back tomorrow and evaluate vs sign off per pt request.    Next Scheduled Treatment:   k     Electronically signed by NILA Piper on 8/10/2023 at 11:15 AM

## 2023-08-10 NOTE — PROGRESS NOTES
Comprehensive Nutrition Assessment    Type and Reason for Visit:  Reassess    Nutrition Recommendations/Plan:   Decrease KCL in TPN today       Nutrition Assessment:    Pt seen for follow up. Pt with Laporascopic Gastrojejunostomy anastomosis, liver biopsy and EGD yesterday 8/9. Pt remains NPO and NGT is now removed. Pt denies any Abd pain, Nausea or vomiting today, and is hopeful he will be able to start clear liquids soon. Pt remains on TPN which is meeting 100% of estimated protein needs and 90% of estimated kcal needs. Labs show potassium and glucose are trending upwards, with K+ at 4.9 today and BG at 209. Will decrease potassium slightly in TPN, and avoid increasing dextrose for now. Nutrition Related Findings:    Meds/Labs reviewed. Wound Type: None       Current Nutrition Intake & Therapies:    Average Meal Intake: NPO  Average Supplements Intake: NPO  PN-Adult 2-in-1 Central Line (Standard)  Diet NPO Exceptions are: Sips of Water with Meds  Current Parenteral Nutrition Orders:  Type and Formula: 2-in-1 Standard   Lipids: 250ml, Daily  Duration: Continuous  Rate/Volume: 56 ml/hr  Current PN Order Provides: 1625 kcal, 90 g protein daily  Goal PN Orders Provides:      Anthropometric Measures:  Height: 5' 9\" (175.3 cm)  Ideal Body Weight (IBW): 160 lbs (73 kg)       Current Body Weight: 140 lb 3.4 oz (63.6 kg), 87.6 % IBW. Weight Source: Standing Scale  Current BMI (kg/m2): 20.7  Usual Body Weight: 176 lb (79.8 kg) (pt report)  % Weight Change (Calculated): -23.6                    BMI Categories: Normal Weight (BMI 18.5-24. 9)    Estimated Daily Nutrient Needs:  Energy Requirements Based On: Kcal/kg  Weight Used for Energy Requirements: Current  Energy (kcal/day): 1800 kcals/day  Weight Used for Protein Requirements: Current  Protein (g/day): 90 gm/day  Method Used for Fluid Requirements: ml/Kg (30)  Fluid (ml/day): 1800 mL/day or per MD    Nutrition Diagnosis:   Severe malnutrition, In context of

## 2023-08-10 NOTE — PROGRESS NOTES
[facility]   Progress Note                   Date:   8/10/2023  Patient name: Taran Boykin  Date of admission:  8/5/2023  9:40 PM  MRN:   [id number]  YOB: 1965  PCP: No primary care provider on file. Reason for Admission: Shortness of breath [R06.02]  Other chest pain [R07.89]  Gastric outlet obstruction [K31.1]  Left pulmonary embolus ([address]) [I26.99]  Single subsegmental pulmonary embolism without acute cor pulmonale (HCC) [I26.93]    Subjective:       Clinical Changes / Abnormalities: Pt seen and examined in the room. Pt denies any CP or sob but having post op pain.         Medications:   Scheduled Meds:   insulin lispro  0-4 Units SubCUTAneous Q6H    enoxaparin  40 mg SubCUTAneous Daily    insulin glargine  5 Units SubCUTAneous Daily    fat emulsion  250 mL IntraVENous Daily    midazolam  2 mg IntraVENous Once    piperacillin-tazobactam  3,375 mg IntraVENous Q6H    naloxegol  25 mg Oral QAM AC    methocarbamol  1,000 mg IntraVENous q8h    metoclopramide  10 mg IntraVENous q8h    acetaminophen  1,000 mg Oral 3 times per day    tiotropium  2 puff Inhalation Daily RT    sodium chloride flush  5-40 mL IntraVENous 2 times per day    [Held by provider] atorvastatin  40 mg Oral Nightly    pantoprazole (PROTONIX) 40 mg injection  40 mg IntraVENous Q12H    lidocaine 1 % injection  5 mL IntraDERmal Once    sodium chloride flush  5-40 mL IntraVENous 2 times per day     Continuous Infusions:   dextrose      PN-Adult 2-in-1 Central Line (Standard) 56 mL/hr (08/09/23 1901)    lactated ringers IV soln 50 mL/hr at 08/10/23 1008    sodium chloride      sodium chloride       CBC:   Recent Labs     08/09/23  0333 08/09/23  2037 08/10/23  0357   WBC 8.7 15.0* 11.0   HGB 13.3 12.3* 12.5*   * 507* 481*     BMP:    Recent Labs     08/09/23  0333 08/09/23  2037 08/10/23  0357    138 138   K 4.4 4.7 4.9    102 104   CO2 24 24 20   BUN 12 16 18   CREATININE 0.6* 0.8 0.8   GLUCOSE 110*

## 2023-08-10 NOTE — PLAN OF CARE
Problem: Discharge Planning  Goal: Discharge to home or other facility with appropriate resources  Outcome: Progressing     Problem: Pain  Goal: Verbalizes/displays adequate comfort level or baseline comfort level  Outcome: Progressing     Problem: ABCDS Injury Assessment  Goal: Absence of physical injury  Outcome: Progressing     Problem: Safety - Adult  Goal: Free from fall injury  Outcome: Progressing     Problem: Nutrition Deficit:  Goal: Optimize nutritional status  Outcome: Progressing     Problem: Skin/Tissue Integrity  Goal: Absence of new skin breakdown  Description: 1. Monitor for areas of redness and/or skin breakdown  2. Assess vascular access sites hourly  3. Every 4-6 hours minimum:  Change oxygen saturation probe site  4. Every 4-6 hours:  If on nasal continuous positive airway pressure, respiratory therapy assess nares and determine need for appliance change or resting period.   Outcome: Progressing     Problem: Respiratory - Adult  Goal: Achieves optimal ventilation and oxygenation  Outcome: Progressing

## 2023-08-10 NOTE — PROGRESS NOTES
[facility]  Office: [id number], DO, Jessica Calvin, DO, Venessa Mathias, DO, Zahra Tuttle, DO, Pablo Nelson MD, Christian Evans MD, Sunil Baird MD, Alaina Urrutia MD,  Talia Scott MD, Zoraida Mane MD, Juan Tavares, DO, Gilson Kovacs MD,  Tereza Miller MD, Mary Tripathi MD, Charlesetta Burkitt, DO, Kevin Ramírez MD,  Eunice Yañez DO, Doris Graves MD, Robby Rivero MD, Sergey Price MD, Priscilla Bernabe MD,  Yong Bamberger, MD, Alicia Hooks MD, Connie Fraser DO, Cady Saleh MD,  Vicente Ball MD, Kavya Lynn, Manav Castellanos, CNP, Fritz Mehta, CNP, Rika Uriarte, CNP,  Dom Muniz, [facility], Kiarra Jaimes, CNP, Lucía Gomez, CNP, Arabella Loredo, CNP, Ragena Rubinstein, CNP, Olamide Reid, CNP, SHEFALI MorejonC, Taiwo Arriola, [facility], Liza Carr, CNP, Veena Flower, [address] [place] [address]    Progress Note    8/10/2023    10:28 AM    Name:   Bev Zaragoza  MRN:     [id number]     Acct:      [de-identified]   Room:   [address] [place] [address] Day:  4  Admit Date:  8/5/2023  9:40 PM    PCP:   No primary care provider on file. Code Status:  Full Code    Subjective:     Patient without complaints today, blood sugars occasionally in the 200s last 24 hours. No new complaints, patient underwent GJ anastomosis yesterday    Brief History: This is a 61-year-old male who presented to the hospital for evaluation of abdominal pain, nausea, vomiting. He has a history of poorly differentiated pancreatic adenocarcinoma status post biliary stenting with Dr. Slim Avalos. On admission patient was found to have a gastric outlet obstruction and is currently waiting for robotic versus open gastrojejunostomy    Medications: Allergies: Allergies   Allergen Reactions    Aspirin     Celery (Apium Graveolens Gabriella.  Jocelyne) Skin Test Nausea Only    Ciprofloxacin     Food      [employer] ( causes ma \"

## 2023-08-10 NOTE — PLAN OF CARE
Problem: Respiratory - Adult  Goal: Achieves optimal ventilation and oxygenation  8/10/2023 0958 by Meredith Ma RCP  Outcome: Progressing  Flowsheets (Taken 8/10/2023 0958)  Achieves optimal ventilation and oxygenation:   Assess for changes in respiratory status   Respiratory therapy support as indicated   Assess for changes in mentation and behavior   Encourage broncho-pulmonary hygiene including cough, deep breathe, incentive spirometry   Assess and instruct to report shortness of breath or any respiratory difficulty  8/9/2023 2310 by Grace Rain RN  Outcome: Progressing

## 2023-08-10 NOTE — PLAN OF CARE
Problem: Discharge Planning  Goal: Discharge to home or other facility with appropriate resources  8/10/2023 1119 by Kyree Zimmerman RN  Outcome: Progressing  8/9/2023 2310 by Nabil Blanco RN  Outcome: Progressing     Problem: Pain  Goal: Verbalizes/displays adequate comfort level or baseline comfort level  8/10/2023 1119 by Kyree Zimmerman RN  Outcome: Progressing  8/9/2023 2310 by Nabil Blanco RN  Outcome: Progressing     Problem: ABCDS Injury Assessment  Goal: Absence of physical injury  8/10/2023 1119 by Kyree Zimmerman RN  Outcome: Progressing  8/9/2023 2310 by Nabil Blanco RN  Outcome: Progressing     Problem: Safety - Adult  Goal: Free from fall injury  8/10/2023 1119 by Kyree Zimmerman RN  Outcome: Progressing  8/9/2023 2310 by Nabil Blanco RN  Outcome: Progressing     Problem: Nutrition Deficit:  Goal: Optimize nutritional status  8/10/2023 1119 by Kyree Zimmerman RN  Outcome: Progressing  Flowsheets (Taken 8/10/2023 1042 by Zana De Los Santos RD)  Nutrient intake appropriate for improving, restoring, or maintaining nutritional needs:   Monitor oral intake, labs, and treatment plans   Recommend appropriate diets, oral nutritional supplements, and vitamin/mineral supplements   Recommend, monitor, and adjust tube feedings and TPN/PPN based on assessed needs  8/9/2023 2310 by Nabil Blanco RN  Outcome: Progressing     Problem: Skin/Tissue Integrity  Goal: Absence of new skin breakdown  Description: 1. Monitor for areas of redness and/or skin breakdown  2. Assess vascular access sites hourly  3. Every 4-6 hours minimum:  Change oxygen saturation probe site  4. Every 4-6 hours:  If on nasal continuous positive airway pressure, respiratory therapy assess nares and determine need for appliance change or resting period.   8/10/2023 1119 by Kyree Zimmerman RN  Outcome: Progressing  8/9/2023 2310 by Nabil Blanco RN  Outcome: Progressing     Problem: Respiratory - Adult  Goal: Achieves optimal

## 2023-08-10 NOTE — PROGRESS NOTES
Today's Date: 8/10/2023  Patient Name: Georges Servin  Date of admission: 8/5/2023  9:40 PM  Patient's age: 66 y.o., 1965  Admission Dx: Shortness of breath [R06.02]  Other chest pain [R07.89]  Gastric outlet obstruction [K31.1]  Left pulmonary embolus ([address]) [I26.99]  Single subsegmental pulmonary embolism without acute cor pulmonale ([address]) [I26.93]    Reason for Consult: management recommendations  Requesting Physician: Magdi Galicia DO    CHIEF COMPLAINT:  abd pain     Interim history  The patient is seen and evaluated. Surgery went well yesterday   Complaining of post operative pain, no nausea or vomiting. During surgery , liver lesion was seen, bx done/   HISTORY OF PRESENT ILLNESS:      The patient is a 66 y.o.  male who is admitted to the hospital for worsening abdominal pain. He is known to us with recently diagnosed pancreatic cancer. Presenting with abdominal pain, obstructive jaundice and nausea and vomiting. CT scan showed suspicious appearing mass at the head of pancreas that was confirmed by you EUS. Biopsy showed adenocarcinoma and clinical stage was T3 N0 M0. The patient underwent stenting of the common bile duct and was treated with antibiotics for pneumonia and was discharged. Now he comes in with worsening abdominal pain. CT scan showed incidentally a segmental pulmonary embolus in the left posterior medial base. No obstruction or flow limitation was appreciated. CT abdomen pelvis showed a pancreatic mass which was identified previously. The stent has good localization. It was felt that his nausea and vomiting and abdominal discomfort is due to gastric outlet obstruction. NG tube was inserted by the surgical team and they are considering gastrojejunostomy bypass.   Past Medical History:   has a past medical history of Abdominal pain, COVID-19 vaccine series completed, Duodenitis, Elevated CEA, Fatty liver, GERD (gastroesophageal reflux disease), History of recent care [Z51.5] 08/07/2023    Goals of care, counseling/discussion [Z71.89] 08/07/2023    Malignant neoplasm of head of pancreas ([address]) [C25.0] 08/07/2023    Single subsegmental pulmonary embolism without acute cor pulmonale (HCC) [I26.93] 08/06/2023    Gastric outlet obstruction [K31.1] 08/06/2023    Hypokalemia [E87.6] 08/06/2023    Anemia [D64.9] 08/06/2023    Chest pain [R07.9] 08/06/2023    Dizziness [R42] 08/06/2023    Dyspnea [R06.00] 08/06/2023    Cancer of head of pancreas ([address]) [C25.0] 08/06/2023    Severe malnutrition ([address]) [E43] 08/06/2023    Pancreatic adenocarcinoma ([address]) [C25.9] 07/28/2023    Pancreatic mass [K86.89] 06/20/2023    GERD (gastroesophageal reflux disease) [K21.9] 05/05/2016         IMPRESSION:   Adenocarcinoma of the pancreas clinical stage T3 N0 M0  Obstructive jaundice status post stenting   gastric outlet obstruction  Left-sided segmental pulmonary embolism without obstruction of the flow      RECOMMENDATIONS:  S/p gastrojejunostomy   Await bx on the liver lesion    Continue supportive care      Discussed with patient and Nurse. Thank you for asking us to see this patient.     ALLISON DEL ROSARIO [facility] MD Kalee  Hematologist/Medical Oncologist  Cell: [phone]

## 2023-08-10 NOTE — PROGRESS NOTES
Upon entering patients room, NG tube had been removed by what we can assume to be the physician as there was no verbal communication to primary nurse of the actual removal of the tube. Only the orders that were placed to discontinue the NG tube. 1115- patient has declined to get up and ambulate and refused therapy and requested that she come back tomorrow    [id number] patient encouraged to walk by Bebeto Orta CNP, writing nurse followed up and walked the patient in the hallway for 2 laps. Independently got himself out of bed and then walked in the maurice without assistance. Was able to achieve 2000ml on his IS, encouraged to keep practicing every hour even while nursing isn't present. Set up to shower with the Chlorhexidine solution. Patient denies needing any assistance showering.

## 2023-08-10 NOTE — ANESTHESIA PROCEDURE NOTES
Peripheral Block    Patient location during procedure: OR  Reason for block: post-op pain management and at surgeon's request  Start time: 8/9/2023 7:38 PM  End time: 8/9/2023 7:45 PM  Staffing  Performed: anesthesiologist   Anesthesiologist: Olman Boggs MD  Peripheral Block   Patient position: supine  Prep: ChloraPrep  Provider prep: mask and sterile gloves  Patient monitoring: cardiac monitor, continuous pulse ox, continuous capnometry, frequent blood pressure checks, IV access and oxygen  Block type: TAP  Laterality: bilateral  Injection technique: single-shot  Guidance: ultrasound guided    Needle   Needle type: insulated echogenic nerve stimulator needle   Needle gauge: 20 G  Needle localization: ultrasound guidance  Needle length: 10 cm  Assessment   Injection assessment: negative aspiration for heme, local visualized surrounding nerve on ultrasound and no intravascular symptoms  Slow fractionated injection: yes  Hemodynamics: stable  Outcomes: uncomplicated    Additional Notes  TAP block  Bilateral  0.2 %  ropivacaine   25 mls on each side  5 mls - Increment  No EKG changes    Medications Administered  ropivacaine (NAROPIN) injection 0.2% - Perineural   50 mL - 8/9/2023 7:38:00 PM

## 2023-08-11 ENCOUNTER — APPOINTMENT (OUTPATIENT)
Dept: GENERAL RADIOLOGY | Age: 58
DRG: 326 | End: 2023-08-11
Payer: MEDICARE

## 2023-08-11 LAB
ALBUMIN SERPL-MCNC: 3.3 G/DL (ref 3.5–5.2)
ALBUMIN/GLOB SERPL: 1 {RATIO} (ref 1–2.5)
ALP SERPL-CCNC: 119 U/L (ref 40–129)
ALT SERPL-CCNC: 15 U/L (ref 5–41)
ANION GAP SERPL CALCULATED.3IONS-SCNC: 13 MMOL/L (ref 9–17)
AST SERPL-CCNC: 16 U/L
BASOPHILS # BLD: 0.06 K/UL (ref 0–0.2)
BASOPHILS NFR BLD: 1 % (ref 0–2)
BILIRUB SERPL-MCNC: 0.3 MG/DL (ref 0.3–1.2)
BNP SERPL-MCNC: 183 PG/ML
BUN SERPL-MCNC: 17 MG/DL (ref 6–20)
CALCIUM SERPL-MCNC: 9.3 MG/DL (ref 8.6–10.4)
CHLORIDE SERPL-SCNC: 105 MMOL/L (ref 98–107)
CO2 SERPL-SCNC: 20 MMOL/L (ref 20–31)
CREAT SERPL-MCNC: 0.7 MG/DL (ref 0.7–1.2)
CRP SERPL HS-MCNC: 12.8 MG/L (ref 0–5)
EOSINOPHIL # BLD: 0.05 K/UL (ref 0–0.44)
EOSINOPHILS RELATIVE PERCENT: 1 % (ref 1–4)
ERYTHROCYTE [DISTWIDTH] IN BLOOD BY AUTOMATED COUNT: 13.6 % (ref 11.8–14.4)
EST. AVERAGE GLUCOSE BLD GHB EST-MCNC: 126 MG/DL
GFR SERPL CREATININE-BSD FRML MDRD: >60 ML/MIN/1.73M2
GLUCOSE BLD-MCNC: 116 MG/DL (ref 75–110)
GLUCOSE BLD-MCNC: 125 MG/DL (ref 75–110)
GLUCOSE BLD-MCNC: 145 MG/DL (ref 75–110)
GLUCOSE BLD-MCNC: 145 MG/DL (ref 75–110)
GLUCOSE BLD-MCNC: 150 MG/DL (ref 75–110)
GLUCOSE SERPL-MCNC: 134 MG/DL (ref 70–99)
HBA1C MFR BLD: 6 % (ref 4–6)
HCT VFR BLD AUTO: 38.2 % (ref 40.7–50.3)
HGB BLD-MCNC: 12.2 G/DL (ref 13–17)
IMM GRANULOCYTES # BLD AUTO: 0.03 K/UL (ref 0–0.3)
IMM GRANULOCYTES NFR BLD: 0 %
INR PPP: 1.1
LYMPHOCYTES NFR BLD: 1.87 K/UL (ref 1.1–3.7)
LYMPHOCYTES RELATIVE PERCENT: 18 % (ref 24–43)
MAGNESIUM SERPL-MCNC: 2.1 MG/DL (ref 1.6–2.6)
MCH RBC QN AUTO: 28.3 PG (ref 25.2–33.5)
MCHC RBC AUTO-ENTMCNC: 31.9 G/DL (ref 28.4–34.8)
MCV RBC AUTO: 88.6 FL (ref 82.6–102.9)
MONOCYTES NFR BLD: 0.95 K/UL (ref 0.1–1.2)
MONOCYTES NFR BLD: 9 % (ref 3–12)
NEUTROPHILS NFR BLD: 71 % (ref 36–65)
NEUTS SEG NFR BLD: 7.3 K/UL (ref 1.5–8.1)
NRBC BLD-RTO: 0 PER 100 WBC
PHOSPHATE SERPL-MCNC: 2.9 MG/DL (ref 2.5–4.5)
PLATELET # BLD AUTO: 513 K/UL (ref 138–453)
PMV BLD AUTO: 9.3 FL (ref 8.1–13.5)
POTASSIUM SERPL-SCNC: 4.2 MMOL/L (ref 3.7–5.3)
PROCALCITONIN SERPL-MCNC: 0.16 NG/ML
PROT SERPL-MCNC: 6.5 G/DL (ref 6.4–8.3)
PROTHROMBIN TIME: 13.6 SEC (ref 11.7–14.9)
RBC # BLD AUTO: 4.31 M/UL (ref 4.21–5.77)
SODIUM SERPL-SCNC: 138 MMOL/L (ref 135–144)
WBC OTHER # BLD: 10.3 K/UL (ref 3.5–11.3)

## 2023-08-11 PROCEDURE — 6370000000 HC RX 637 (ALT 250 FOR IP): Performed by: NURSE PRACTITIONER

## 2023-08-11 PROCEDURE — APPSS45 APP SPLIT SHARED TIME 31-45 MINUTES: Performed by: NURSE PRACTITIONER

## 2023-08-11 PROCEDURE — 99024 POSTOP FOLLOW-UP VISIT: CPT | Performed by: SURGERY

## 2023-08-11 PROCEDURE — 84100 ASSAY OF PHOSPHORUS: CPT

## 2023-08-11 PROCEDURE — 2060000000 HC ICU INTERMEDIATE R&B

## 2023-08-11 PROCEDURE — 99232 SBSQ HOSP IP/OBS MODERATE 35: CPT | Performed by: INTERNAL MEDICINE

## 2023-08-11 PROCEDURE — C9113 INJ PANTOPRAZOLE SODIUM, VIA: HCPCS | Performed by: SURGERY

## 2023-08-11 PROCEDURE — 2580000003 HC RX 258: Performed by: SURGERY

## 2023-08-11 PROCEDURE — 74018 RADEX ABDOMEN 1 VIEW: CPT

## 2023-08-11 PROCEDURE — 2500000003 HC RX 250 WO HCPCS: Performed by: SURGERY

## 2023-08-11 PROCEDURE — 94640 AIRWAY INHALATION TREATMENT: CPT

## 2023-08-11 PROCEDURE — 6360000002 HC RX W HCPCS: Performed by: SURGERY

## 2023-08-11 PROCEDURE — 36415 COLL VENOUS BLD VENIPUNCTURE: CPT

## 2023-08-11 PROCEDURE — 82947 ASSAY GLUCOSE BLOOD QUANT: CPT

## 2023-08-11 PROCEDURE — 84145 PROCALCITONIN (PCT): CPT

## 2023-08-11 PROCEDURE — 85610 PROTHROMBIN TIME: CPT

## 2023-08-11 PROCEDURE — 86140 C-REACTIVE PROTEIN: CPT

## 2023-08-11 PROCEDURE — 83880 ASSAY OF NATRIURETIC PEPTIDE: CPT

## 2023-08-11 PROCEDURE — 99231 SBSQ HOSP IP/OBS SF/LOW 25: CPT | Performed by: INTERNAL MEDICINE

## 2023-08-11 PROCEDURE — 83735 ASSAY OF MAGNESIUM: CPT

## 2023-08-11 PROCEDURE — 6360000002 HC RX W HCPCS: Performed by: INTERNAL MEDICINE

## 2023-08-11 PROCEDURE — 85025 COMPLETE CBC W/AUTO DIFF WBC: CPT

## 2023-08-11 PROCEDURE — 6370000000 HC RX 637 (ALT 250 FOR IP): Performed by: INTERNAL MEDICINE

## 2023-08-11 PROCEDURE — 80053 COMPREHEN METABOLIC PANEL: CPT

## 2023-08-11 PROCEDURE — 6360000002 HC RX W HCPCS: Performed by: NURSE PRACTITIONER

## 2023-08-11 PROCEDURE — 6370000000 HC RX 637 (ALT 250 FOR IP): Performed by: SURGERY

## 2023-08-11 RX ORDER — ENOXAPARIN SODIUM 100 MG/ML
1 INJECTION SUBCUTANEOUS DAILY
Status: DISCONTINUED | OUTPATIENT
Start: 2023-08-11 | End: 2023-08-11

## 2023-08-11 RX ORDER — ENOXAPARIN SODIUM 100 MG/ML
40 INJECTION SUBCUTANEOUS 2 TIMES DAILY
Status: DISCONTINUED | OUTPATIENT
Start: 2023-08-11 | End: 2023-08-12

## 2023-08-11 RX ORDER — OXYCODONE HCL 5 MG/5 ML
10 SOLUTION, ORAL ORAL EVERY 4 HOURS PRN
Status: DISCONTINUED | OUTPATIENT
Start: 2023-08-11 | End: 2023-08-14

## 2023-08-11 RX ORDER — ENOXAPARIN SODIUM 100 MG/ML
40 INJECTION SUBCUTANEOUS 2 TIMES DAILY
Status: DISCONTINUED | OUTPATIENT
Start: 2023-08-11 | End: 2023-08-11

## 2023-08-11 RX ORDER — OXYCODONE HCL 5 MG/5 ML
5 SOLUTION, ORAL ORAL EVERY 4 HOURS PRN
Status: DISCONTINUED | OUTPATIENT
Start: 2023-08-11 | End: 2023-08-14

## 2023-08-11 RX ORDER — BISACODYL 10 MG
10 SUPPOSITORY, RECTAL RECTAL ONCE
Status: COMPLETED | OUTPATIENT
Start: 2023-08-11 | End: 2023-08-11

## 2023-08-11 RX ORDER — FENTANYL CITRATE 50 UG/ML
25 INJECTION, SOLUTION INTRAMUSCULAR; INTRAVENOUS
Status: DISCONTINUED | OUTPATIENT
Start: 2023-08-11 | End: 2023-08-14

## 2023-08-11 RX ORDER — ENOXAPARIN SODIUM 100 MG/ML
40 INJECTION SUBCUTANEOUS DAILY
Status: DISCONTINUED | OUTPATIENT
Start: 2023-08-11 | End: 2023-08-11

## 2023-08-11 RX ADMIN — NALOXEGOL OXALATE 25 MG: 12.5 TABLET, FILM COATED ORAL at 05:24

## 2023-08-11 RX ADMIN — METOCLOPRAMIDE 10 MG: 5 INJECTION, SOLUTION INTRAMUSCULAR; INTRAVENOUS at 15:23

## 2023-08-11 RX ADMIN — I.V. FAT EMULSION 250 ML: 20 EMULSION INTRAVENOUS at 18:20

## 2023-08-11 RX ADMIN — FENTANYL CITRATE 25 MCG: 50 INJECTION, SOLUTION INTRAMUSCULAR; INTRAVENOUS at 15:08

## 2023-08-11 RX ADMIN — SODIUM CHLORIDE, PRESERVATIVE FREE 10 ML: 5 INJECTION INTRAVENOUS at 09:23

## 2023-08-11 RX ADMIN — FENTANYL CITRATE 50 MCG: 50 INJECTION, SOLUTION INTRAMUSCULAR; INTRAVENOUS at 08:54

## 2023-08-11 RX ADMIN — ENOXAPARIN SODIUM 40 MG: 100 INJECTION SUBCUTANEOUS at 09:22

## 2023-08-11 RX ADMIN — OXYCODONE HYDROCHLORIDE 10 MG: 5 SOLUTION ORAL at 15:10

## 2023-08-11 RX ADMIN — FENTANYL CITRATE 25 MCG: 50 INJECTION, SOLUTION INTRAMUSCULAR; INTRAVENOUS at 18:30

## 2023-08-11 RX ADMIN — SODIUM CHLORIDE, PRESERVATIVE FREE 40 MG: 5 INJECTION INTRAVENOUS at 20:14

## 2023-08-11 RX ADMIN — MAGNESIUM HYDROXIDE 15 ML: 400 SUSPENSION ORAL at 10:33

## 2023-08-11 RX ADMIN — FENTANYL CITRATE 50 MCG: 50 INJECTION, SOLUTION INTRAMUSCULAR; INTRAVENOUS at 05:23

## 2023-08-11 RX ADMIN — OXYCODONE HYDROCHLORIDE 10 MG: 5 SOLUTION ORAL at 20:15

## 2023-08-11 RX ADMIN — METHOCARBAMOL 1000 MG: 100 INJECTION INTRAMUSCULAR; INTRAVENOUS at 15:15

## 2023-08-11 RX ADMIN — FENTANYL CITRATE 25 MCG: 50 INJECTION, SOLUTION INTRAMUSCULAR; INTRAVENOUS at 23:13

## 2023-08-11 RX ADMIN — SODIUM CHLORIDE, PRESERVATIVE FREE 40 MG: 5 INJECTION INTRAVENOUS at 09:22

## 2023-08-11 RX ADMIN — INSULIN GLARGINE 5 UNITS: 100 INJECTION, SOLUTION SUBCUTANEOUS at 09:21

## 2023-08-11 RX ADMIN — METOCLOPRAMIDE 10 MG: 5 INJECTION, SOLUTION INTRAMUSCULAR; INTRAVENOUS at 05:24

## 2023-08-11 RX ADMIN — METHOCARBAMOL 1000 MG: 100 INJECTION INTRAMUSCULAR; INTRAVENOUS at 05:24

## 2023-08-11 RX ADMIN — FENTANYL CITRATE 50 MCG: 50 INJECTION, SOLUTION INTRAMUSCULAR; INTRAVENOUS at 03:41

## 2023-08-11 RX ADMIN — METOCLOPRAMIDE 10 MG: 5 INJECTION, SOLUTION INTRAMUSCULAR; INTRAVENOUS at 20:15

## 2023-08-11 RX ADMIN — ENOXAPARIN SODIUM 40 MG: 100 INJECTION SUBCUTANEOUS at 20:15

## 2023-08-11 RX ADMIN — POTASSIUM CHLORIDE: 2 INJECTION, SOLUTION, CONCENTRATE INTRAVENOUS at 18:20

## 2023-08-11 RX ADMIN — SODIUM CHLORIDE, PRESERVATIVE FREE 10 ML: 5 INJECTION INTRAVENOUS at 15:14

## 2023-08-11 RX ADMIN — SODIUM CHLORIDE, PRESERVATIVE FREE 10 ML: 5 INJECTION INTRAVENOUS at 15:22

## 2023-08-11 RX ADMIN — TIOTROPIUM BROMIDE INHALATION SPRAY 2 PUFF: 3.12 SPRAY, METERED RESPIRATORY (INHALATION) at 10:06

## 2023-08-11 RX ADMIN — BISACODYL 10 MG: 10 SUPPOSITORY RECTAL at 10:33

## 2023-08-11 RX ADMIN — METHOCARBAMOL 1000 MG: 100 INJECTION INTRAMUSCULAR; INTRAVENOUS at 20:15

## 2023-08-11 ASSESSMENT — PAIN DESCRIPTION - DESCRIPTORS
DESCRIPTORS: ACHING;DISCOMFORT;PRESSURE
DESCRIPTORS: DISCOMFORT;ACHING
DESCRIPTORS: ACHING;DISCOMFORT
DESCRIPTORS: ACHING;TENDER;DISCOMFORT

## 2023-08-11 ASSESSMENT — ENCOUNTER SYMPTOMS
ABDOMINAL PAIN: 1
EYES NEGATIVE: 1
RESPIRATORY NEGATIVE: 1
ALLERGIC/IMMUNOLOGIC NEGATIVE: 1
VOMITING: 0

## 2023-08-11 ASSESSMENT — PAIN DESCRIPTION - ORIENTATION
ORIENTATION: RIGHT;LEFT;UPPER;LOWER
ORIENTATION: RIGHT;LEFT;UPPER;LOWER
ORIENTATION: RIGHT;LEFT
ORIENTATION: ANTERIOR

## 2023-08-11 ASSESSMENT — PAIN SCALES - GENERAL
PAINLEVEL_OUTOF10: 8
PAINLEVEL_OUTOF10: 9
PAINLEVEL_OUTOF10: 2
PAINLEVEL_OUTOF10: 4
PAINLEVEL_OUTOF10: 10
PAINLEVEL_OUTOF10: 8
PAINLEVEL_OUTOF10: 9
PAINLEVEL_OUTOF10: 10

## 2023-08-11 ASSESSMENT — PAIN DESCRIPTION - LOCATION
LOCATION: ABDOMEN

## 2023-08-11 NOTE — PROGRESS NOTES
PULMONARY & CRITICAL CARE MEDICINE PROGRESS  NOTE     Patient:  Moshe Taylor  MRN: [id number]  Admit date: 8/5/2023  Primary Care Physician: No primary care provider on file. Consulting Physician: Mere Ann DO  CODE Status: Full Code  LOS: 5    SUBJECTIVE     I personally interviewed/examined the patient, reviewed interval history and interpreted all available radiographic, laboratory data at the time of service. Chief Compliant/Reason for Initial Consult:     Pulmonary evaluation/preoperative risk stratification. Brief Hospital Course: The patient is a 66 y.o. male  patient is scheduled for robotic gastrojejunostomy and endoscopy tomorrow by Dr. Fazal Asif and pulmonary service was consulted for finding of emphysema on CT scan recent pneumonia and risk stratification. Patient was recently diagnosed with adenocarcinoma of the head of the pancreas when he presented previous admission. Nausea vomiting abdominal pain and obstructive jaundice he had EUS done biopsy of the pancreatic mass was positive for adenocarcinoma. He also had biliary stent placed. During previous admission on 07/21/2023 he was diagnosed to have a small subsegmental left lower lobe pulm embolism and he was placed on Eliquis at that time. He also has right lower lobe infiltrate present on 07/21/2023 which was increased on CT scan on 07/23/2023 during last admission and he was treated for pneumonia. He presented again with nausea vomiting, abdominal pain and a CT scan again done at this time shows patent biliary stent shows gastric outlet obstruction. A CTA chest done this time showed isolated left lower lobe pulm embolism as before a slightly less prominent depending on the technique and volume of the bolus for CTA. Right lower lobe infiltrate which was seen on CT scan on 07/23/2023 is not seen on current CT on 08/06/2023.   CT scan of the chest shows centrilobular needed. Encourage incentive spirometry, pulmonary toilet, aspiration precautions and bronchodilators   On TPN  Antimicrobials reviewed; currently on Zosyn  DVT prophylaxis he is on lovenox 40 once daily  Physical/occupational therapy; increase activity as tolerated as allowed by surgery service. Discussed with nursing staff. I updated the patient regarding the current clinical condition, provisional diagnosis and management plan. I addressed concerns and answered all questions to the best of my abilities. It was my pleasure to evaluate Moshe Taylor today. We will continue to follow. I would like to thank you for allowing me to participate in the care of this patient. Please feel free to call with any further questions or concerns. Nina Jiang MD, M.D. Pulmonary and Critical Care Medicine           8/11/2023, 10:27 AM    Please note that this chart was generated using voice recognition Dragon dictation software. Although every effort was made to ensure the accuracy of this automated transcription, some errors in transcription may have occurred.

## 2023-08-11 NOTE — PLAN OF CARE
Problem: Discharge Planning  Goal: Discharge to home or other facility with appropriate resources  8/10/2023 2124 by Nicole Erickson RN  Outcome: Progressing  8/10/2023 1119 by Surjit Ott RN  Outcome: Progressing     Problem: Pain  Goal: Verbalizes/displays adequate comfort level or baseline comfort level  8/10/2023 2124 by Nicole Erickson RN  Outcome: Progressing  8/10/2023 1119 by Surjit Ott RN  Outcome: Progressing     Problem: ABCDS Injury Assessment  Goal: Absence of physical injury  8/10/2023 2124 by Nicole Erickson RN  Outcome: Progressing  8/10/2023 1119 by Surjit Ott RN  Outcome: Progressing     Problem: Safety - Adult  Goal: Free from fall injury  8/10/2023 2124 by Nicole Erickson RN  Outcome: Progressing  8/10/2023 1119 by Surjit Ott RN  Outcome: Progressing     Problem: Nutrition Deficit:  Goal: Optimize nutritional status  8/10/2023 2124 by Nicole Erickson RN  Outcome: Progressing  8/10/2023 1119 by Surjit Ott RN  Outcome: Progressing  Flowsheets (Taken 8/10/2023 1042 by Tomás Mckeon RD)  Nutrient intake appropriate for improving, restoring, or maintaining nutritional needs:   Monitor oral intake, labs, and treatment plans   Recommend appropriate diets, oral nutritional supplements, and vitamin/mineral supplements   Recommend, monitor, and adjust tube feedings and TPN/PPN based on assessed needs     Problem: Skin/Tissue Integrity  Goal: Absence of new skin breakdown  Description: 1. Monitor for areas of redness and/or skin breakdown  2. Assess vascular access sites hourly  3. Every 4-6 hours minimum:  Change oxygen saturation probe site  4. Every 4-6 hours:  If on nasal continuous positive airway pressure, respiratory therapy assess nares and determine need for appliance change or resting period.   8/10/2023 2124 by Nicole Erickson RN  Outcome: Progressing  8/10/2023 1119 by Surjit Ott RN  Outcome: Progressing     Problem: Respiratory - Adult  Goal: Achieves optimal ventilation and oxygenation  8/10/2023 2124 by Murtaza Echevarria RN  Outcome: Progressing  8/10/2023 1119 by Berkley Montero RN  Outcome: Progressing  8/10/2023 0958 by Nita Schaeffer RCP  Outcome: Progressing  Flowsheets (Taken 8/10/2023 0958)  Achieves optimal ventilation and oxygenation:   Assess for changes in respiratory status   Respiratory therapy support as indicated   Assess for changes in mentation and behavior   Encourage broncho-pulmonary hygiene including cough, deep breathe, incentive spirometry   Assess and instruct to report shortness of breath or any respiratory difficulty

## 2023-08-11 NOTE — PROGRESS NOTES
Patient refused ambulation this evening, RN provided patient with education regarding risks and benefits of ambulation post-surgery.  Patient acknowledged, states he will walk later tonight or in the AM.

## 2023-08-11 NOTE — PLAN OF CARE
Problem: Respiratory - Adult  Goal: Achieves optimal ventilation and oxygenation  8/11/2023 1011 by Jarett Chacon RCP  Outcome: Progressing  Flowsheets (Taken 8/11/2023 1011)  Achieves optimal ventilation and oxygenation:   Assess for changes in respiratory status   Assess for changes in mentation and behavior   Respiratory therapy support as indicated   Assess and instruct to report shortness of breath or any respiratory difficulty   Encourage broncho-pulmonary hygiene including cough, deep breathe, incentive spirometry  8/10/2023 2124 by Marc Mancia RN  Outcome: Progressing

## 2023-08-11 NOTE — PLAN OF CARE
Problem: Discharge Planning  Goal: Discharge to home or other facility with appropriate resources  Outcome: Progressing     Problem: Pain  Goal: Verbalizes/displays adequate comfort level or baseline comfort level  Outcome: Progressing     Problem: ABCDS Injury Assessment  Goal: Absence of physical injury  Outcome: Progressing     Problem: Safety - Adult  Goal: Free from fall injury  Outcome: Progressing     Problem: Nutrition Deficit:  Goal: Optimize nutritional status  Outcome: Progressing  Flowsheets (Taken 8/11/2023 1117 by Alida Maria RD)  Nutrient intake appropriate for improving, restoring, or maintaining nutritional needs:   Assess nutritional status and recommend course of action   Monitor oral intake, labs, and treatment plans   Recommend appropriate diets, oral nutritional supplements, and vitamin/mineral supplements   Recommend, monitor, and adjust tube feedings and TPN/PPN based on assessed needs     Problem: Skin/Tissue Integrity  Goal: Absence of new skin breakdown  Description: 1. Monitor for areas of redness and/or skin breakdown  2. Assess vascular access sites hourly  3. Every 4-6 hours minimum:  Change oxygen saturation probe site  4. Every 4-6 hours:  If on nasal continuous positive airway pressure, respiratory therapy assess nares and determine need for appliance change or resting period.   Outcome: Progressing     Problem: Respiratory - Adult  Goal: Achieves optimal ventilation and oxygenation  8/11/2023 1334 by Isaias Milner RN  Outcome: Progressing  8/11/2023 1011 by Chrissie Anderson RCP  Outcome: Progressing  Flowsheets (Taken 8/11/2023 1011)  Achieves optimal ventilation and oxygenation:   Assess for changes in respiratory status   Assess for changes in mentation and behavior   Respiratory therapy support as indicated   Assess and instruct to report shortness of breath or any respiratory difficulty   Encourage broncho-pulmonary hygiene including cough, deep breathe, incentive spirometry

## 2023-08-11 NOTE — PROGRESS NOTES
Surgery update:  - pt noted to have left sided sq emphysema likely in the setting of recent surgery  - cont to monitor pt's resp status on RA  - encouraged IS, coughing/deep breathing, ambulation/being up OOB  - notify surgery, primary, pulm for any alterations in respiratory status  - we will continue to monitor    KATYA Marquez  Surgical Oncology/ [facility]  Available through [address] [place] [address]

## 2023-08-11 NOTE — PROGRESS NOTES
Nutrition Assessment     Type and Reason for Visit: Reassess    Nutrition Recommendations/Plan:   Continue same TPN orders today     Malnutrition Assessment:  Malnutrition Status: Severe malnutrition    Nutrition Assessment:  Pt seen for follow up. Pt remains NPO with sips of clears allowed, though Pt reports he has not yet had any. Pt denies any N/V, though continues to c/o some Abd pain. He is eager to eat and drink. Pt remains on TPN, which is meeting 90% of estimated kcal needs and 100% of estimated protein needs. Pt remains on Reglan. Labs show potassium is WNL today at 4.2. No changes to TPN orders today. Estimated Daily Nutrient Needs:  Energy (kcal):  1800 kcals/day Weight Used for Energy Requirements: Current     Protein (g):  90 gm/day Weight Used for Protein Requirements: Current        Fluid (ml/day):  1800 mL/day or per MD Method Used for Fluid Requirements: ml/Kg (30)    Nutrition Related Findings:   Meds/Labs reviewed.  Wound Type: Surgical Incision    Current Nutrition Therapies:    PN-Adult 2-in-1 Central Line (Standard)  Diet NPO Exceptions are: Sips of Water with Meds, Sips of Clear Liquids, Ice Chips    Anthropometric Measures:  Height: 5' 9\" (175.3 cm)  Current Body Wt: 140 lb 3.4 oz (63.6 kg)   BMI: 20.7    Nutrition Diagnosis:   Severe malnutrition, In context of chronic illness related to inadequate protein-energy intake, catabolic illness as evidenced by Criteria as identified in malnutrition assessment    Nutrition Interventions:   Food and/or Nutrient Delivery: Start Oral Diet, Continue Current Parenteral Nutrition  Nutrition Education/Counseling: No recommendation at this time  Coordination of Nutrition Care: Continue to monitor while inpatient  Plan of Care discussed with: Patient, Nurse    Goals:  Previous Goal Met: Goal(s) Achieved  Goals: Meet at least 75% of estimated needs, prior to discharge       Nutrition Monitoring and Evaluation:   Behavioral-Environmental Outcomes:

## 2023-08-11 NOTE — PROGRESS NOTES
[facility]  Office: [address], DO, Gloria Mills, DO, Twyla Mario, DO, Bo Tuttle, DO, Gregory Mahan MD, Austin Newberry MD, Jennifer Laughlin MD, Latasha Raygoza MD,  Tha Brush MD, Chirag Romeo MD, Perfecto Alexander, DO, Caesar Son MD,  Olivia Douglas MD, Jitendra Scott MD, Norbert Schroeder DO, Francisca Simmons MD,  Robert Madison DO, To Bolaños MD, Aishwarya Glover MD, Thang Monson MD, Lima Juárez MD,  Matheus Pozo MD, Jeremias Sánchez MD, Charis Hernandez DO, Ck Barnes MD,  Susan Randolph MD, Rusty Quinonez, Mookie Molina, CNP, Deniz Olmstead, CNP, Olivia Palmer, CNP,  Carter Freeman, [facility], Samantha Alegre, CNP, Fabi Alvarez, CNP, Krista Parr, CNP, Zoë Mcguire, CNP, [facility], Alonso Rolel PA-C, Chester Kennedy, JACKY, Graciela Mariscal CNP, [facility], [address] [place] [address]    Progress Note    8/11/2023    12:14 PM    Name:   Doreen Barnett  MRN:     [id number]     Acct:      [de-identified]   Room:   [address] [place] [address] Day:  5  Admit Date:  8/5/2023  9:40 PM    PCP:   No primary care provider on file. Code Status:  Full Code    Subjective:     Patient started on Lantus yesterday, blood sugars improved. Still having abdominal distention, moving independently without any issues  Brief History: This is a 79-year-old male who presented to the hospital for evaluation of abdominal pain, nausea, vomiting. He has a history of poorly differentiated pancreatic adenocarcinoma status post biliary stenting with Dr. Shasta Sorto. On admission patient was found to have a gastric outlet obstruction and is currently waiting for robotic versus open gastrojejunostomy    Medications: Allergies: Allergies   Allergen Reactions    Aspirin     Celery (Apium Graveolens Gabriella.  Jocelyne) Skin Test Nausea Only    Ciprofloxacin     Food      [employer] ( causes ma \" nasty\" rash 8/6/2023  Marked gastric distension consistent with gastric outlet obstruction. There appears to be a large ill-defined pancreatic head mass which cannot be accurately evaluated by this exam. Interim placement of common duct biliary stent since the comparison study of 07/21/2023 with note of pneumobilia. Mild chronic T11 anterior wedge compression deformity. Moderate grade 1 anterolisthesis of L5 on S1 due to bilateral L5 pars defects. XR CHEST PORTABLE    Result Date: 8/7/2023  Near complete resolution of bibasilar airspace disease. CT CHEST PULMONARY EMBOLISM W CONTRAST    Result Date: 8/6/2023  Short segment pulmonary embolism not resulting in obstruction and not considered to be flow limiting in a single mid to distal branch to the posteromedial left lung base. Background of moderate emphysema. Marked gastric distension suggesting gastric outlet obstruction. Pneumobilia noted in the mid to anterior liver. XR ABDOMEN FOR NG/OG/NE TUBE PLACEMENT    Result Date: 8/6/2023  Gastric tube with the tip in the gastric fundus. FL UGI    Result Date: 8/6/2023  Contrast does not empty into the duodenum concerning for a degree of obstruction. Follow-up abdominal radiographs may be helpful to assess contrast movement.        Physical Examination:     General appearance:  alert, cooperative and no distress, NG tube in place  Mental Status:  oriented to person, place and time and normal affect  Lungs:  clear to auscultation bilaterally, normal effort  Heart:  regular rate and rhythm, no murmur  Abdomen:  soft, nontender, nondistended, normal bowel sounds, no masses, hepatomegaly, splenomegaly  Extremities:  no edema, redness, tenderness in the calves  Skin:  no gross lesions, rashes, induration    Assessment:     Hospital Problems             Last Modified POA    * (Principal) Single subsegmental pulmonary embolism without acute cor pulmonale ([address]) 8/6/2023 Yes    GERD (gastroesophageal reflux disease) 8/6/2023

## 2023-08-11 NOTE — PROGRESS NOTES
Today's Date: 8/11/2023  Patient Name: Daly Tapia  Date of admission: 8/5/2023  9:40 PM  Patient's age: 66 y.o., 1965  Admission Dx: Shortness of breath [R06.02]  Other chest pain [R07.89]  Gastric outlet obstruction [K31.1]  Left pulmonary embolus ([address]) [I26.99]  Single subsegmental pulmonary embolism without acute cor pulmonale ([address]) [I26.93]    Reason for Consult: management recommendations  Requesting Physician: Lance Amezcua DO    CHIEF COMPLAINT:  abd pain     Interim history  The patient is seen and evaluated. Recovering from surgery   Plan anticoagulation with lovenox (full dose if OK with HBP surgery)  Blood sugars are better   HISTORY OF PRESENT ILLNESS:      The patient is a 66 y.o.  male who is admitted to the hospital for worsening abdominal pain. He is known to us with recently diagnosed pancreatic cancer. Presenting with abdominal pain, obstructive jaundice and nausea and vomiting. CT scan showed suspicious appearing mass at the head of pancreas that was confirmed by you EUS. Biopsy showed adenocarcinoma and clinical stage was T3 N0 M0. The patient underwent stenting of the common bile duct and was treated with antibiotics for pneumonia and was discharged. Now he comes in with worsening abdominal pain. CT scan showed incidentally a segmental pulmonary embolus in the left posterior medial base. No obstruction or flow limitation was appreciated. CT abdomen pelvis showed a pancreatic mass which was identified previously. The stent has good localization. It was felt that his nausea and vomiting and abdominal discomfort is due to gastric outlet obstruction. NG tube was inserted by the surgical team and they are considering gastrojejunostomy bypass.   Past Medical History:   has a past medical history of Abdominal pain, COVID-19 vaccine series completed, Duodenitis, Elevated CEA, Fatty liver, GERD (gastroesophageal reflux disease), History of recent hospitalization, None

## 2023-08-11 NOTE — PROGRESS NOTES
Patient up ambulating independently in the Page    1230- patient independently showered with the Chlorhexidine solution    1500- ambulated in the [place] independently

## 2023-08-12 LAB
ALBUMIN SERPL-MCNC: 3.4 G/DL (ref 3.5–5.2)
ALBUMIN/GLOB SERPL: 1 {RATIO} (ref 1–2.5)
ALP SERPL-CCNC: 117 U/L (ref 40–129)
ALT SERPL-CCNC: 15 U/L (ref 5–41)
ANION GAP SERPL CALCULATED.3IONS-SCNC: 13 MMOL/L (ref 9–17)
AST SERPL-CCNC: 23 U/L
BASOPHILS # BLD: 0.03 K/UL (ref 0–0.2)
BASOPHILS NFR BLD: 0 % (ref 0–2)
BILIRUB SERPL-MCNC: 0.3 MG/DL (ref 0.3–1.2)
BNP SERPL-MCNC: 113 PG/ML
BUN SERPL-MCNC: 13 MG/DL (ref 6–20)
CALCIUM SERPL-MCNC: 9.4 MG/DL (ref 8.6–10.4)
CHLORIDE SERPL-SCNC: 102 MMOL/L (ref 98–107)
CO2 SERPL-SCNC: 20 MMOL/L (ref 20–31)
CREAT SERPL-MCNC: 0.6 MG/DL (ref 0.7–1.2)
CRP SERPL HS-MCNC: 8.3 MG/L (ref 0–5)
EOSINOPHIL # BLD: 0.21 K/UL (ref 0–0.44)
EOSINOPHILS RELATIVE PERCENT: 3 % (ref 1–4)
ERYTHROCYTE [DISTWIDTH] IN BLOOD BY AUTOMATED COUNT: 13.6 % (ref 11.8–14.4)
GFR SERPL CREATININE-BSD FRML MDRD: >60 ML/MIN/1.73M2
GLUCOSE BLD-MCNC: 115 MG/DL (ref 75–110)
GLUCOSE BLD-MCNC: 118 MG/DL (ref 75–110)
GLUCOSE BLD-MCNC: 158 MG/DL (ref 75–110)
GLUCOSE BLD-MCNC: 167 MG/DL (ref 75–110)
GLUCOSE SERPL-MCNC: 123 MG/DL (ref 70–99)
HCT VFR BLD AUTO: 40 % (ref 40.7–50.3)
HGB BLD-MCNC: 12.8 G/DL (ref 13–17)
IMM GRANULOCYTES # BLD AUTO: <0.03 K/UL (ref 0–0.3)
IMM GRANULOCYTES NFR BLD: 0 %
INR PPP: 1
LYMPHOCYTES NFR BLD: 2.55 K/UL (ref 1.1–3.7)
LYMPHOCYTES RELATIVE PERCENT: 30 % (ref 24–43)
MAGNESIUM SERPL-MCNC: 2.3 MG/DL (ref 1.6–2.6)
MCH RBC QN AUTO: 28.4 PG (ref 25.2–33.5)
MCHC RBC AUTO-ENTMCNC: 32 G/DL (ref 28.4–34.8)
MCV RBC AUTO: 88.9 FL (ref 82.6–102.9)
MONOCYTES NFR BLD: 0.78 K/UL (ref 0.1–1.2)
MONOCYTES NFR BLD: 9 % (ref 3–12)
NEUTROPHILS NFR BLD: 58 % (ref 36–65)
NEUTS SEG NFR BLD: 4.83 K/UL (ref 1.5–8.1)
NRBC BLD-RTO: 0 PER 100 WBC
PHOSPHATE SERPL-MCNC: 3.2 MG/DL (ref 2.5–4.5)
PLATELET # BLD AUTO: 465 K/UL (ref 138–453)
PMV BLD AUTO: 9.1 FL (ref 8.1–13.5)
POTASSIUM SERPL-SCNC: 4.7 MMOL/L (ref 3.7–5.3)
PROCALCITONIN SERPL-MCNC: 0.1 NG/ML
PROT SERPL-MCNC: 6.8 G/DL (ref 6.4–8.3)
PROTHROMBIN TIME: 13.1 SEC (ref 11.7–14.9)
RBC # BLD AUTO: 4.5 M/UL (ref 4.21–5.77)
SODIUM SERPL-SCNC: 135 MMOL/L (ref 135–144)
WBC OTHER # BLD: 8.4 K/UL (ref 3.5–11.3)

## 2023-08-12 PROCEDURE — 6360000002 HC RX W HCPCS: Performed by: SURGERY

## 2023-08-12 PROCEDURE — C9113 INJ PANTOPRAZOLE SODIUM, VIA: HCPCS | Performed by: SURGERY

## 2023-08-12 PROCEDURE — 82947 ASSAY GLUCOSE BLOOD QUANT: CPT

## 2023-08-12 PROCEDURE — 85610 PROTHROMBIN TIME: CPT

## 2023-08-12 PROCEDURE — 99232 SBSQ HOSP IP/OBS MODERATE 35: CPT | Performed by: INTERNAL MEDICINE

## 2023-08-12 PROCEDURE — 2060000000 HC ICU INTERMEDIATE R&B

## 2023-08-12 PROCEDURE — 84100 ASSAY OF PHOSPHORUS: CPT

## 2023-08-12 PROCEDURE — 2580000003 HC RX 258: Performed by: SURGERY

## 2023-08-12 PROCEDURE — 80053 COMPREHEN METABOLIC PANEL: CPT

## 2023-08-12 PROCEDURE — 6370000000 HC RX 637 (ALT 250 FOR IP): Performed by: NURSE PRACTITIONER

## 2023-08-12 PROCEDURE — 83735 ASSAY OF MAGNESIUM: CPT

## 2023-08-12 PROCEDURE — 84145 PROCALCITONIN (PCT): CPT

## 2023-08-12 PROCEDURE — 99024 POSTOP FOLLOW-UP VISIT: CPT | Performed by: SURGERY

## 2023-08-12 PROCEDURE — 6370000000 HC RX 637 (ALT 250 FOR IP): Performed by: SURGERY

## 2023-08-12 PROCEDURE — 36415 COLL VENOUS BLD VENIPUNCTURE: CPT

## 2023-08-12 PROCEDURE — 83880 ASSAY OF NATRIURETIC PEPTIDE: CPT

## 2023-08-12 PROCEDURE — 85025 COMPLETE CBC W/AUTO DIFF WBC: CPT

## 2023-08-12 PROCEDURE — 6370000000 HC RX 637 (ALT 250 FOR IP): Performed by: INTERNAL MEDICINE

## 2023-08-12 PROCEDURE — 2500000003 HC RX 250 WO HCPCS: Performed by: SURGERY

## 2023-08-12 PROCEDURE — 94640 AIRWAY INHALATION TREATMENT: CPT

## 2023-08-12 PROCEDURE — 86140 C-REACTIVE PROTEIN: CPT

## 2023-08-12 RX ORDER — ENOXAPARIN SODIUM 100 MG/ML
60 INJECTION SUBCUTANEOUS 2 TIMES DAILY
Status: DISCONTINUED | OUTPATIENT
Start: 2023-08-12 | End: 2023-08-14

## 2023-08-12 RX ADMIN — SODIUM CHLORIDE, PRESERVATIVE FREE 40 MG: 5 INJECTION INTRAVENOUS at 08:46

## 2023-08-12 RX ADMIN — OXYCODONE HYDROCHLORIDE 10 MG: 5 SOLUTION ORAL at 01:04

## 2023-08-12 RX ADMIN — METHOCARBAMOL 1000 MG: 100 INJECTION INTRAMUSCULAR; INTRAVENOUS at 13:58

## 2023-08-12 RX ADMIN — OXYCODONE HYDROCHLORIDE 10 MG: 5 SOLUTION ORAL at 10:32

## 2023-08-12 RX ADMIN — METOCLOPRAMIDE 10 MG: 5 INJECTION, SOLUTION INTRAMUSCULAR; INTRAVENOUS at 22:46

## 2023-08-12 RX ADMIN — FENTANYL CITRATE 25 MCG: 50 INJECTION, SOLUTION INTRAMUSCULAR; INTRAVENOUS at 08:45

## 2023-08-12 RX ADMIN — METHOCARBAMOL 1000 MG: 100 INJECTION INTRAMUSCULAR; INTRAVENOUS at 05:32

## 2023-08-12 RX ADMIN — METOCLOPRAMIDE 10 MG: 5 INJECTION, SOLUTION INTRAMUSCULAR; INTRAVENOUS at 14:02

## 2023-08-12 RX ADMIN — OXYCODONE HYDROCHLORIDE 10 MG: 5 SOLUTION ORAL at 05:32

## 2023-08-12 RX ADMIN — MAGNESIUM HYDROXIDE 15 ML: 400 SUSPENSION ORAL at 08:44

## 2023-08-12 RX ADMIN — ENOXAPARIN SODIUM 60 MG: 100 INJECTION SUBCUTANEOUS at 10:33

## 2023-08-12 RX ADMIN — TIOTROPIUM BROMIDE INHALATION SPRAY 2 PUFF: 3.12 SPRAY, METERED RESPIRATORY (INHALATION) at 07:54

## 2023-08-12 RX ADMIN — OXYCODONE HYDROCHLORIDE 10 MG: 5 SOLUTION ORAL at 18:15

## 2023-08-12 RX ADMIN — POTASSIUM CHLORIDE: 2 INJECTION, SOLUTION, CONCENTRATE INTRAVENOUS at 18:25

## 2023-08-12 RX ADMIN — SODIUM CHLORIDE, PRESERVATIVE FREE 10 ML: 5 INJECTION INTRAVENOUS at 13:45

## 2023-08-12 RX ADMIN — FENTANYL CITRATE 25 MCG: 50 INJECTION, SOLUTION INTRAMUSCULAR; INTRAVENOUS at 13:44

## 2023-08-12 RX ADMIN — METHOCARBAMOL 1000 MG: 100 INJECTION INTRAMUSCULAR; INTRAVENOUS at 22:46

## 2023-08-12 RX ADMIN — METOCLOPRAMIDE 10 MG: 5 INJECTION, SOLUTION INTRAMUSCULAR; INTRAVENOUS at 05:32

## 2023-08-12 RX ADMIN — OXYCODONE HYDROCHLORIDE 10 MG: 5 SOLUTION ORAL at 22:46

## 2023-08-12 RX ADMIN — NALOXEGOL OXALATE 25 MG: 12.5 TABLET, FILM COATED ORAL at 05:32

## 2023-08-12 RX ADMIN — SODIUM CHLORIDE, PRESERVATIVE FREE 10 ML: 5 INJECTION INTRAVENOUS at 08:47

## 2023-08-12 RX ADMIN — I.V. FAT EMULSION 250 ML: 20 EMULSION INTRAVENOUS at 18:25

## 2023-08-12 RX ADMIN — INSULIN GLARGINE 5 UNITS: 100 INJECTION, SOLUTION SUBCUTANEOUS at 08:44

## 2023-08-12 RX ADMIN — SODIUM CHLORIDE, PRESERVATIVE FREE 10 ML: 5 INJECTION INTRAVENOUS at 14:05

## 2023-08-12 RX ADMIN — SODIUM CHLORIDE, PRESERVATIVE FREE 40 MG: 5 INJECTION INTRAVENOUS at 20:02

## 2023-08-12 RX ADMIN — ENOXAPARIN SODIUM 60 MG: 100 INJECTION SUBCUTANEOUS at 20:01

## 2023-08-12 RX ADMIN — FENTANYL CITRATE 25 MCG: 50 INJECTION, SOLUTION INTRAMUSCULAR; INTRAVENOUS at 02:27

## 2023-08-12 ASSESSMENT — PAIN SCALES - GENERAL
PAINLEVEL_OUTOF10: 10
PAINLEVEL_OUTOF10: 7
PAINLEVEL_OUTOF10: 10
PAINLEVEL_OUTOF10: 6
PAINLEVEL_OUTOF10: 10
PAINLEVEL_OUTOF10: 10
PAINLEVEL_OUTOF10: 9
PAINLEVEL_OUTOF10: 9
PAINLEVEL_OUTOF10: 7
PAINLEVEL_OUTOF10: 9
PAINLEVEL_OUTOF10: 10
PAINLEVEL_OUTOF10: 8
PAINLEVEL_OUTOF10: 10
PAINLEVEL_OUTOF10: 7
PAINLEVEL_OUTOF10: 10

## 2023-08-12 ASSESSMENT — PAIN DESCRIPTION - LOCATION
LOCATION: ABDOMEN

## 2023-08-12 ASSESSMENT — PAIN DESCRIPTION - ORIENTATION
ORIENTATION: RIGHT;LEFT
ORIENTATION: MID
ORIENTATION: RIGHT;LEFT
ORIENTATION: RIGHT;LEFT
ORIENTATION: ANTERIOR

## 2023-08-12 ASSESSMENT — PAIN DESCRIPTION - DESCRIPTORS
DESCRIPTORS: DISCOMFORT;CRAMPING
DESCRIPTORS: ACHING;SHARP
DESCRIPTORS: ACHING;DISCOMFORT;TENDER
DESCRIPTORS: DISCOMFORT;TENDER
DESCRIPTORS: ACHING;DISCOMFORT;TENDER
DESCRIPTORS: ACHING;DISCOMFORT;TENDER

## 2023-08-12 NOTE — PROGRESS NOTES
I saw and evaluated the patient, performing the key elements of the service. I discussed the findings, assessment and plan with the nurse practitioner/resident and agree with the their findings and plan as documented in the their note. Postop day 3 with robotic GJ for gastric outlet obstruction due to [address]  H/H stable, therapeutic AC  Feels better, no N/V  Will start him on clears today, I told him to go slow due to risk of delayed gastric emptying  HOB 60 degrees, he needs to sit up in the chair when drinking  Multimodality pain control  Continue Reglan, Robaxin and Movantik  He is staying over the weekend     Time spent caring for patient 25 minutes    I spent greater than 50% of the visit coordinating care and answering all questions from patient, and family members, reviewing the possible outcome of the treatment. Reji Etienne MD [facility]  Surgical Oncology/HPB Surgery  [facility]  [address]  [facility] [address]  Office:  [phone]  Fax:  [phone]  8/12/2023  8:33 AM

## 2023-08-12 NOTE — PLAN OF CARE
Epic message sent to Pulmonary group to follow-up; please call (or ask patient to call) [phone] to schedule outpatient appointment. 3-4 months for COPD. If the patient develops new inpatient pulmonary needs we can assist with, please don't hesitate to re-consult us.

## 2023-08-12 NOTE — PROGRESS NOTES
[facility]  Office: [address], DO, Janet Orozco, DO, Naila Webb, DO, Katherine Pierce Blood, DO, Patricia Fuentes MD, Patrick Aguero MD, Norman Lara MD, Sophia Flores MD,  Fitz Webber MD, Flip Saucedo MD, Giovanna Torres, DO, Jim Henriquez MD,  Saritha Vick MD, Nabil Posada MD, Thomas Veloz DO, Elodia Olmos MD,  Lety Jarquin, DO, Soni Munson MD, Nato Conrad MD, Jamila Koehler MD, Heather Dumont MD,  Lester Lara MD, Liane Francisco MD, Figueroa Rai DO, Elías Grider MD,  Sandra Jim MD, Quentin Dewitt, Flori Antonio, CNP, Rj Camacho, CNP, Aubrey Henson, CNP,  Bisi Laws, [facility], Yennifer Briseno, CNP, Rinku Cortes, CNP, Barrett Lorenzo, CNP, Malachi Jain, CNP, Rajiv Atkins, CNP, Zoila Marquez PA-C, Aide Schafer, [facility], Alexandr Bermudez, CNP, [facility], [address] [place] [address]    Progress Note    8/12/2023    11:04 AM    Name:   Cisco Gomez  MRN:     [id number]     Acct:      [de-identified]   Room:   [address] [place] [address] Day:  6  Admit Date:  8/5/2023  9:40 PM    PCP:   No primary care provider on file. Code Status:  Full Code    Subjective:     Patient's blood sugars have been well controlled on 5 units of Lantus, no other issues currently. Hepatobiliary surgery requesting to monitor over the weekend, slowly advancing diet. No other complaints currently    Brief History: This is a 72-year-old male who presented to the hospital for evaluation of abdominal pain, nausea, vomiting. He has a history of poorly differentiated pancreatic adenocarcinoma status post biliary stenting with Dr. Vy Freeman. On admission patient was found to have a gastric outlet obstruction and is currently waiting for robotic versus open gastrojejunostomy    Medications: Allergies: Allergies   Allergen Reactions    Aspirin     Celery (Apium Graveolens Gabriella.  [facility]) Skin Test Net -1510 ml         Labs:  Hematology:  Recent Labs     08/10/23  0357 08/11/23  0200 08/12/23  0224   WBC 11.0 10.3 8.4   RBC 4.39 4.31 4.50   HGB 12.5* 12.2* 12.8*   HCT 39.5* 38.2* 40.0*   MCV 90.0 88.6 88.9   MCH 28.5 28.3 28.4   MCHC 31.6 31.9 32.0   RDW 13.6 13.6 13.6   * 513* 465*   MPV 9.0 9.3 9.1   CRP 12.5* 12.8* 8.3*   INR 1.0 1.1 1.0       Chemistry:  Recent Labs     08/10/23  0357 08/11/23  0200 08/12/23  0224    138 135   K 4.9 4.2 4.7    105 102   CO2 20 20 20   GLUCOSE 209* 134* 123*   BUN 18 17 13   CREATININE 0.8 0.7 0.6*   MG 2.1 2.1 2.3   ANIONGAP 14 13 13   LABGLOM >60 >60 >60   CALCIUM 9.5 9.3 9.4   PHOS 3.0 2.9 3.2   PROBNP 67 183 113       Recent Labs     08/10/23  0357 08/10/23  1151 08/11/23  0200 08/11/23  0543 08/11/23  1255 08/11/23  1803 08/11/23  2342 08/12/23  0224 08/12/23  0543 08/12/23  0753   PROT 6.7  --  6.5  --   --   --   --  6.8  --   --    LABALBU 3.5  --  3.3*  --   --   --   --  3.4*  --   --    LABA1C 6.0  --   --   --   --   --   --   --   --   --    AST 18  --  16  --   --   --   --  23  --   --    ALT 19  --  15  --   --   --   --  15  --   --    ALKPHOS 129  --  119  --   --   --   --  117  --   --    BILITOT 0.3  --  0.3  --   --   --   --  0.3  --   --    POCGLU  --    < >  --  145* 125* 145* 116*  --  118* 115*    < > = values in this interval not displayed. ABG:No results found for: POCPH, PHART, PH, POCPCO2, TSD4CTO, PCO2, POCPO2, PO2ART, PO2, POCHCO3, XBN3KMO, HCO3, NBEA, PBEA, BEART, BE, THGBART, THB, UWT8CTS, BTAL9NAD, F3IEGUWW, O2SAT, FIO2  Lab Results   Component Value Date/Time    SPECIAL RT [facility] 9ML 07/21/2023 09:48 AM     Lab Results   Component Value Date/Time    CULTURE CLERICAL ERROR 07/25/2023 11:51 AM       Radiology:  XR ABDOMEN (KUB) (SINGLE AP VIEW)    Result Date: 8/6/2023  Nonspecific bowel gas pattern without evidence for obstruction.      CT ABDOMEN PELVIS W IV CONTRAST Additional Contrast? None    Result Date:

## 2023-08-12 NOTE — PLAN OF CARE
Problem: Discharge Planning  Goal: Discharge to home or other facility with appropriate resources  8/11/2023 2044 by Sameer Pozo RN  Outcome: Progressing  8/11/2023 1334 by Varinder Simons RN  Outcome: Progressing     Problem: Pain  Goal: Verbalizes/displays adequate comfort level or baseline comfort level  8/11/2023 2044 by Sameer Pozo RN  Outcome: Progressing  8/11/2023 1334 by Varinder Simons RN  Outcome: Progressing     Problem: ABCDS Injury Assessment  Goal: Absence of physical injury  8/11/2023 2044 by Sameer Pozo RN  Outcome: Progressing  8/11/2023 1334 by Varinder Simons RN  Outcome: Progressing     Problem: Safety - Adult  Goal: Free from fall injury  8/11/2023 2044 by Sameer Pozo RN  Outcome: Progressing  8/11/2023 1334 by Varinder Simons RN  Outcome: Progressing     Problem: Nutrition Deficit:  Goal: Optimize nutritional status  8/11/2023 2044 by Sameer Pozo RN  Outcome: Progressing  8/11/2023 1334 by Varinder Simons RN  Outcome: Progressing  Flowsheets (Taken 8/11/2023 1117 by Martina Styles RD)  Nutrient intake appropriate for improving, restoring, or maintaining nutritional needs:   Assess nutritional status and recommend course of action   Monitor oral intake, labs, and treatment plans   Recommend appropriate diets, oral nutritional supplements, and vitamin/mineral supplements   Recommend, monitor, and adjust tube feedings and TPN/PPN based on assessed needs     Problem: Skin/Tissue Integrity  Goal: Absence of new skin breakdown  Description: 1. Monitor for areas of redness and/or skin breakdown  2. Assess vascular access sites hourly  3. Every 4-6 hours minimum:  Change oxygen saturation probe site  4. Every 4-6 hours:  If on nasal continuous positive airway pressure, respiratory therapy assess nares and determine need for appliance change or resting period.   8/11/2023 2044 by Sameer Pozo RN  Outcome: Progressing  8/11/2023 1334 by Varinder Simons RN  Outcome:

## 2023-08-12 NOTE — PROGRESS NOTES
Patient resting quietly in bed, refusing to get up and go for a walk. 1200- patient continues to refuse to get up and ambulate. 1450- patient stood up on the scale, and then was instructed to take a couple laps in the maurice in which he was compliant this time. Patient out of bed and moves independently.

## 2023-08-12 NOTE — PROGRESS NOTES
Comprehensive Nutrition Assessment    Type and Reason for Visit:  Reassess    Nutrition Recommendations/Plan:   TPN to continue. Decrease K+, remove MVI/trace elements in next bag. Monitor PO intake/tolerance on CLD. Malnutrition Assessment:  Malnutrition Status:  Severe malnutrition (08/06/23 1117)    Context:  Chronic Illness     Findings of the 6 clinical characteristics of malnutrition:  Energy Intake:  75% or less estimated energy requirements for 1 month or longer  Weight Loss:  Greater than 20% over 1 year     Body Fat Loss:   (Moderate to severe) Orbital, Triceps   Muscle Mass Loss:   (Moderate to severe) Temples (temporalis), Clavicles (pectoralis & deltoids), Hand (interosseous)  Fluid Accumulation:  No significant fluid accumulation     Strength:  Not Performed    Nutrition Assessment:    Pt diet advanced to CLD, pt is looking forward to eating, reports he has not yet recieved a tray. Pt reports he has been tolerating sips of clears. Discussed ONS, pt does not like Ensure Clear, like Ensure Enlive (no chocolate flavor). TPN appropriate to continue until pt meeting > 50% of nutrition needs via PO. Pt remains on TPN, which is meeting 90% of estimated kcal needs and 100% of estimated protein needs. Labs reviewed: K+ 4.7 mmol/L, Phos 3.2 mg/dL. Nutrition Related Findings:    Meds/Labs reviewed. Wound Type: Surgical Incision (to abdomen)       Current Nutrition Intake & Therapies:    Average Meal Intake: NPO (Tolerating sips of clears)  Average Supplements Intake: NPO  PN-Adult 2-in-1 Central Line (Standard)  ADULT DIET; Clear Liquid;  No Carbonated Beverages  Current Parenteral Nutrition Orders:  Type and Formula: 2-in-1 Standard   Lipids: 250ml, Daily  Duration: Continuous  Rate/Volume: 56 ml/hr  Current PN Order Provides: 1625 kcal, 90 g protein  Goal PN Orders Provides:      Anthropometric Measures:  Height: 5' 9\" (175.3 cm)  Ideal Body Weight (IBW): 160 lbs (73 kg)       Current Body Weight: 140 lb 3.4 oz (63.6 kg), 87.6 % IBW. Weight Source: Standing Scale  Current BMI (kg/m2): 20.7  Usual Body Weight: 176 lb (79.8 kg) (pt report)  % Weight Change (Calculated): -23.6                    BMI Categories: Normal Weight (BMI 18.5-24. 9)    Estimated Daily Nutrient Needs:  Energy Requirements Based On: Kcal/kg  Weight Used for Energy Requirements: Current  Energy (kcal/day): 1800 kcals/day  Weight Used for Protein Requirements: Current  Protein (g/day): 90 gm/day  Method Used for Fluid Requirements: ml/Kg (30)  Fluid (ml/day): 1800 mL/day or per MD    Nutrition Diagnosis:   Severe malnutrition, In context of chronic illness related to inadequate protein-energy intake, catabolic illness as evidenced by Criteria as identified in malnutrition assessment    Nutrition Interventions:   Food and/or Nutrient Delivery: Modify Parenteral Nutrition  Nutrition Education/Counseling: No recommendation at this time  Coordination of Nutrition Care: Continue to monitor while inpatient  Plan of Care discussed with: pt    Goals:  Previous Goal Met: Goal(s) Achieved  Goals: Meet at least 75% of estimated needs, prior to discharge       Nutrition Monitoring and Evaluation:   Behavioral-Environmental Outcomes: None Identified  Food/Nutrient Intake Outcomes: Diet Advancement/Tolerance, Food and Nutrient Intake, Parenteral Nutrition Intake/Tolerance  Physical Signs/Symptoms Outcomes: Biochemical Data, GI Status, Skin, Weight, Nausea or Vomiting, Fluid Status or Edema    Discharge Planning:     Too soon to determine     Carmen Turner MS, RD, LD  Contact: [phone]

## 2023-08-12 NOTE — PLAN OF CARE
Problem: Respiratory - Adult  Goal: Achieves optimal ventilation and oxygenation  8/12/2023 0909 by Jermaine Chan RCP  Outcome: Progressing   BRONCHOSPASM/BRONCHOCONSTRICTION     [x]         IMPROVE AERATION/BREATH SOUNDS  [x]   ADMINISTER BRONCHODILATOR THERAPY AS APPROPRIATE  [x]   ASSESS BREATH SOUNDS  []   IMPLEMENT AEROSOL/MDI PROTOCOL  [x]   PATIENT EDUCATION AS NEEDED

## 2023-08-12 NOTE — PLAN OF CARE
Problem: Discharge Planning  Goal: Discharge to home or other facility with appropriate resources  8/12/2023 0903 by Esperanza Bullard RN  Outcome: Progressing  8/11/2023 2044 by Wilhelminia Kussmaul, RN  Outcome: Progressing     Problem: Pain  Goal: Verbalizes/displays adequate comfort level or baseline comfort level  8/12/2023 0903 by Esperanza Bullard RN  Outcome: Progressing  8/11/2023 2044 by Wilhelminia Kussmaul, RN  Outcome: Progressing     Problem: ABCDS Injury Assessment  Goal: Absence of physical injury  8/12/2023 0903 by Esperanza Bullard RN  Outcome: Progressing  8/11/2023 2044 by Wilhelminia Kussmaul, RN  Outcome: Progressing     Problem: Safety - Adult  Goal: Free from fall injury  8/12/2023 0903 by Esperanza Bullard RN  Outcome: Progressing  8/11/2023 2044 by Wilhelminia Kussmaul, RN  Outcome: Progressing     Problem: Nutrition Deficit:  Goal: Optimize nutritional status  8/12/2023 0903 by Esperanza Bullard RN  Outcome: Progressing  8/11/2023 2044 by Wilhelminia Kussmaul, RN  Outcome: Progressing     Problem: Skin/Tissue Integrity  Goal: Absence of new skin breakdown  Description: 1. Monitor for areas of redness and/or skin breakdown  2. Assess vascular access sites hourly  3. Every 4-6 hours minimum:  Change oxygen saturation probe site  4. Every 4-6 hours:  If on nasal continuous positive airway pressure, respiratory therapy assess nares and determine need for appliance change or resting period.   8/12/2023 7253 by Esperanza Bullard RN  Outcome: Progressing  8/11/2023 2044 by Wilhelminia Kussmaul, RN  Outcome: Progressing     Problem: Respiratory - Adult  Goal: Achieves optimal ventilation and oxygenation  8/12/2023 0903 by Esperanza Bullard RN  Outcome: Progressing  8/11/2023 2044 by Wilhelminia Kussmaul, RN  Outcome: Progressing

## 2023-08-13 LAB
ALBUMIN SERPL-MCNC: 3.4 G/DL (ref 3.5–5.2)
ALBUMIN SERPL-MCNC: 3.7 G/DL (ref 3.5–5.2)
ALBUMIN/GLOB SERPL: 1 {RATIO} (ref 1–2.5)
ALBUMIN/GLOB SERPL: 1.1 {RATIO} (ref 1–2.5)
ALP SERPL-CCNC: 119 U/L (ref 40–129)
ALP SERPL-CCNC: 126 U/L (ref 40–129)
ALT SERPL-CCNC: 19 U/L (ref 5–41)
ALT SERPL-CCNC: 19 U/L (ref 5–41)
ANION GAP SERPL CALCULATED.3IONS-SCNC: 12 MMOL/L (ref 9–17)
ANION GAP SERPL CALCULATED.3IONS-SCNC: 8 MMOL/L (ref 9–17)
AST SERPL-CCNC: 17 U/L
AST SERPL-CCNC: 18 U/L
BASOPHILS # BLD: 0.04 K/UL (ref 0–0.2)
BASOPHILS NFR BLD: 1 % (ref 0–2)
BILIRUB SERPL-MCNC: 0.3 MG/DL (ref 0.3–1.2)
BILIRUB SERPL-MCNC: 0.3 MG/DL (ref 0.3–1.2)
BNP SERPL-MCNC: 46 PG/ML
BUN SERPL-MCNC: 11 MG/DL (ref 6–20)
BUN SERPL-MCNC: 13 MG/DL (ref 6–20)
CALCIUM SERPL-MCNC: 9.5 MG/DL (ref 8.6–10.4)
CALCIUM SERPL-MCNC: 9.8 MG/DL (ref 8.6–10.4)
CHLORIDE SERPL-SCNC: 100 MMOL/L (ref 98–107)
CHLORIDE SERPL-SCNC: 102 MMOL/L (ref 98–107)
CO2 SERPL-SCNC: 19 MMOL/L (ref 20–31)
CO2 SERPL-SCNC: 25 MMOL/L (ref 20–31)
CREAT SERPL-MCNC: 0.7 MG/DL (ref 0.7–1.2)
CREAT SERPL-MCNC: 0.8 MG/DL (ref 0.7–1.2)
CRP SERPL HS-MCNC: 6.3 MG/L (ref 0–5)
EOSINOPHIL # BLD: 0.28 K/UL (ref 0–0.44)
EOSINOPHILS RELATIVE PERCENT: 3 % (ref 1–4)
ERYTHROCYTE [DISTWIDTH] IN BLOOD BY AUTOMATED COUNT: 13.4 % (ref 11.8–14.4)
GFR SERPL CREATININE-BSD FRML MDRD: >60 ML/MIN/1.73M2
GFR SERPL CREATININE-BSD FRML MDRD: >60 ML/MIN/1.73M2
GLUCOSE BLD-MCNC: 115 MG/DL (ref 75–110)
GLUCOSE BLD-MCNC: 134 MG/DL (ref 75–110)
GLUCOSE BLD-MCNC: 157 MG/DL (ref 75–110)
GLUCOSE BLD-MCNC: 158 MG/DL (ref 75–110)
GLUCOSE SERPL-MCNC: 106 MG/DL (ref 70–99)
GLUCOSE SERPL-MCNC: 120 MG/DL (ref 70–99)
HCT VFR BLD AUTO: 43.4 % (ref 40.7–50.3)
HGB BLD-MCNC: 13.6 G/DL (ref 13–17)
IMM GRANULOCYTES # BLD AUTO: <0.03 K/UL (ref 0–0.3)
IMM GRANULOCYTES NFR BLD: 0 %
INR PPP: 1
LYMPHOCYTES NFR BLD: 2.73 K/UL (ref 1.1–3.7)
LYMPHOCYTES RELATIVE PERCENT: 32 % (ref 24–43)
MAGNESIUM SERPL-MCNC: 2.4 MG/DL (ref 1.6–2.6)
MCH RBC QN AUTO: 28.3 PG (ref 25.2–33.5)
MCHC RBC AUTO-ENTMCNC: 31.3 G/DL (ref 28.4–34.8)
MCV RBC AUTO: 90.2 FL (ref 82.6–102.9)
MONOCYTES NFR BLD: 0.75 K/UL (ref 0.1–1.2)
MONOCYTES NFR BLD: 9 % (ref 3–12)
NEUTROPHILS NFR BLD: 55 % (ref 36–65)
NEUTS SEG NFR BLD: 4.69 K/UL (ref 1.5–8.1)
NRBC BLD-RTO: 0 PER 100 WBC
PHOSPHATE SERPL-MCNC: 2.8 MG/DL (ref 2.5–4.5)
PLATELET # BLD AUTO: 451 K/UL (ref 138–453)
PMV BLD AUTO: 9.3 FL (ref 8.1–13.5)
POTASSIUM SERPL-SCNC: 4 MMOL/L (ref 3.7–5.3)
POTASSIUM SERPL-SCNC: 4.4 MMOL/L (ref 3.7–5.3)
PROCALCITONIN SERPL-MCNC: 0.09 NG/ML
PROT SERPL-MCNC: 6.5 G/DL (ref 6.4–8.3)
PROT SERPL-MCNC: 7.3 G/DL (ref 6.4–8.3)
PROTHROMBIN TIME: 12.6 SEC (ref 11.7–14.9)
RBC # BLD AUTO: 4.81 M/UL (ref 4.21–5.77)
SODIUM SERPL-SCNC: 133 MMOL/L (ref 135–144)
SODIUM SERPL-SCNC: 133 MMOL/L (ref 135–144)
WBC OTHER # BLD: 8.5 K/UL (ref 3.5–11.3)

## 2023-08-13 PROCEDURE — C9113 INJ PANTOPRAZOLE SODIUM, VIA: HCPCS | Performed by: SURGERY

## 2023-08-13 PROCEDURE — 2580000003 HC RX 258: Performed by: SURGERY

## 2023-08-13 PROCEDURE — 6360000002 HC RX W HCPCS: Performed by: SURGERY

## 2023-08-13 PROCEDURE — 85025 COMPLETE CBC W/AUTO DIFF WBC: CPT

## 2023-08-13 PROCEDURE — 83735 ASSAY OF MAGNESIUM: CPT

## 2023-08-13 PROCEDURE — 86140 C-REACTIVE PROTEIN: CPT

## 2023-08-13 PROCEDURE — 6370000000 HC RX 637 (ALT 250 FOR IP): Performed by: SURGERY

## 2023-08-13 PROCEDURE — 99232 SBSQ HOSP IP/OBS MODERATE 35: CPT | Performed by: INTERNAL MEDICINE

## 2023-08-13 PROCEDURE — 6370000000 HC RX 637 (ALT 250 FOR IP): Performed by: NURSE PRACTITIONER

## 2023-08-13 PROCEDURE — 84145 PROCALCITONIN (PCT): CPT

## 2023-08-13 PROCEDURE — 80053 COMPREHEN METABOLIC PANEL: CPT

## 2023-08-13 PROCEDURE — 2060000000 HC ICU INTERMEDIATE R&B

## 2023-08-13 PROCEDURE — 85610 PROTHROMBIN TIME: CPT

## 2023-08-13 PROCEDURE — 82947 ASSAY GLUCOSE BLOOD QUANT: CPT

## 2023-08-13 PROCEDURE — 83880 ASSAY OF NATRIURETIC PEPTIDE: CPT

## 2023-08-13 PROCEDURE — 36415 COLL VENOUS BLD VENIPUNCTURE: CPT

## 2023-08-13 PROCEDURE — 94640 AIRWAY INHALATION TREATMENT: CPT

## 2023-08-13 PROCEDURE — 94761 N-INVAS EAR/PLS OXIMETRY MLT: CPT

## 2023-08-13 PROCEDURE — 6370000000 HC RX 637 (ALT 250 FOR IP): Performed by: INTERNAL MEDICINE

## 2023-08-13 PROCEDURE — 84100 ASSAY OF PHOSPHORUS: CPT

## 2023-08-13 RX ORDER — SODIUM CHLORIDE, SODIUM LACTATE, POTASSIUM CHLORIDE, AND CALCIUM CHLORIDE .6; .31; .03; .02 G/100ML; G/100ML; G/100ML; G/100ML
500 INJECTION, SOLUTION INTRAVENOUS ONCE
Status: COMPLETED | OUTPATIENT
Start: 2023-08-13 | End: 2023-08-13

## 2023-08-13 RX ADMIN — OXYCODONE HYDROCHLORIDE 10 MG: 5 SOLUTION ORAL at 17:07

## 2023-08-13 RX ADMIN — OXYCODONE HYDROCHLORIDE 10 MG: 5 SOLUTION ORAL at 21:28

## 2023-08-13 RX ADMIN — NALOXEGOL OXALATE 25 MG: 12.5 TABLET, FILM COATED ORAL at 06:14

## 2023-08-13 RX ADMIN — SODIUM CHLORIDE, PRESERVATIVE FREE 40 MG: 5 INJECTION INTRAVENOUS at 22:30

## 2023-08-13 RX ADMIN — SODIUM CHLORIDE, PRESERVATIVE FREE 10 ML: 5 INJECTION INTRAVENOUS at 08:44

## 2023-08-13 RX ADMIN — INSULIN GLARGINE 5 UNITS: 100 INJECTION, SOLUTION SUBCUTANEOUS at 08:43

## 2023-08-13 RX ADMIN — METOCLOPRAMIDE 10 MG: 5 INJECTION, SOLUTION INTRAMUSCULAR; INTRAVENOUS at 21:29

## 2023-08-13 RX ADMIN — SODIUM CHLORIDE, PRESERVATIVE FREE 10 ML: 5 INJECTION INTRAVENOUS at 08:35

## 2023-08-13 RX ADMIN — SODIUM CHLORIDE, PRESERVATIVE FREE 10 ML: 5 INJECTION INTRAVENOUS at 20:11

## 2023-08-13 RX ADMIN — METOCLOPRAMIDE 10 MG: 5 INJECTION, SOLUTION INTRAMUSCULAR; INTRAVENOUS at 06:14

## 2023-08-13 RX ADMIN — METHOCARBAMOL 1000 MG: 100 INJECTION INTRAMUSCULAR; INTRAVENOUS at 21:29

## 2023-08-13 RX ADMIN — METHOCARBAMOL 1000 MG: 100 INJECTION INTRAMUSCULAR; INTRAVENOUS at 14:02

## 2023-08-13 RX ADMIN — OXYCODONE HYDROCHLORIDE 10 MG: 5 SOLUTION ORAL at 10:45

## 2023-08-13 RX ADMIN — ENOXAPARIN SODIUM 60 MG: 100 INJECTION SUBCUTANEOUS at 20:10

## 2023-08-13 RX ADMIN — SODIUM CHLORIDE, POTASSIUM CHLORIDE, SODIUM LACTATE AND CALCIUM CHLORIDE 500 ML: 600; 310; 30; 20 INJECTION, SOLUTION INTRAVENOUS at 06:25

## 2023-08-13 RX ADMIN — ENOXAPARIN SODIUM 60 MG: 100 INJECTION SUBCUTANEOUS at 08:43

## 2023-08-13 RX ADMIN — SODIUM CHLORIDE, PRESERVATIVE FREE 10 ML: 5 INJECTION INTRAVENOUS at 17:54

## 2023-08-13 RX ADMIN — METHOCARBAMOL 1000 MG: 100 INJECTION INTRAMUSCULAR; INTRAVENOUS at 06:14

## 2023-08-13 RX ADMIN — TIOTROPIUM BROMIDE INHALATION SPRAY 2 PUFF: 3.12 SPRAY, METERED RESPIRATORY (INHALATION) at 10:00

## 2023-08-13 RX ADMIN — MAGNESIUM HYDROXIDE 15 ML: 400 SUSPENSION ORAL at 08:40

## 2023-08-13 RX ADMIN — METOCLOPRAMIDE 10 MG: 5 INJECTION, SOLUTION INTRAMUSCULAR; INTRAVENOUS at 14:01

## 2023-08-13 RX ADMIN — SODIUM CHLORIDE, PRESERVATIVE FREE 40 MG: 5 INJECTION INTRAVENOUS at 08:43

## 2023-08-13 RX ADMIN — OXYCODONE HYDROCHLORIDE 10 MG: 5 SOLUTION ORAL at 06:14

## 2023-08-13 RX ADMIN — OXYCODONE HYDROCHLORIDE 10 MG: 5 SOLUTION ORAL at 03:00

## 2023-08-13 ASSESSMENT — PAIN DESCRIPTION - DESCRIPTORS
DESCRIPTORS: ACHING
DESCRIPTORS: SHARP
DESCRIPTORS: ACHING;DISCOMFORT
DESCRIPTORS: ACHING;DISCOMFORT;TENDER
DESCRIPTORS: SHARP
DESCRIPTORS: ACHING;DISCOMFORT;TENDER

## 2023-08-13 ASSESSMENT — PAIN DESCRIPTION - LOCATION
LOCATION: ABDOMEN

## 2023-08-13 ASSESSMENT — PAIN DESCRIPTION - ORIENTATION
ORIENTATION: MID
ORIENTATION: MID
ORIENTATION: RIGHT;LEFT
ORIENTATION: MID
ORIENTATION: RIGHT;LEFT

## 2023-08-13 ASSESSMENT — PAIN SCALES - GENERAL
PAINLEVEL_OUTOF10: 7
PAINLEVEL_OUTOF10: 10
PAINLEVEL_OUTOF10: 10
PAINLEVEL_OUTOF10: 4
PAINLEVEL_OUTOF10: 7
PAINLEVEL_OUTOF10: 8
PAINLEVEL_OUTOF10: 10
PAINLEVEL_OUTOF10: 10
PAINLEVEL_OUTOF10: 5

## 2023-08-13 NOTE — PLAN OF CARE
Problem: Discharge Planning  Goal: Discharge to home or other facility with appropriate resources  8/13/2023 1010 by Rubina Bull RN  Outcome: Progressing  8/13/2023 0212 by Oh Santos RN  Outcome: Progressing     Problem: Pain  Goal: Verbalizes/displays adequate comfort level or baseline comfort level  8/13/2023 1010 by Rubina Bull RN  Outcome: Progressing  8/13/2023 0212 by Oh Santos RN  Outcome: Progressing     Problem: ABCDS Injury Assessment  Goal: Absence of physical injury  8/13/2023 1010 by Rubina Bull RN  Outcome: Progressing  8/13/2023 0212 by Oh Santos RN  Outcome: Progressing     Problem: Safety - Adult  Goal: Free from fall injury  8/13/2023 1010 by Rubina Bull RN  Outcome: Progressing  8/13/2023 0212 by Oh Santos RN  Outcome: Progressing     Problem: Nutrition Deficit:  Goal: Optimize nutritional status  8/13/2023 1010 by Rubina Bull RN  Outcome: Progressing  Flowsheets (Taken 8/13/2023 0858 by Gianfranco Sanon, MS, RD, LD)  Nutrient intake appropriate for improving, restoring, or maintaining nutritional needs:   Assess nutritional status and recommend course of action   Monitor oral intake, labs, and treatment plans   Recommend appropriate diets, oral nutritional supplements, and vitamin/mineral supplements   Recommend, monitor, and adjust tube feedings and TPN/PPN based on assessed needs  8/13/2023 0212 by Oh Santos RN  Outcome: Progressing     Problem: Skin/Tissue Integrity  Goal: Absence of new skin breakdown  Description: 1. Monitor for areas of redness and/or skin breakdown  2. Assess vascular access sites hourly  3. Every 4-6 hours minimum:  Change oxygen saturation probe site  4. Every 4-6 hours:  If on nasal continuous positive airway pressure, respiratory therapy assess nares and determine need for appliance change or resting period.   8/13/2023 1010 by Rubina Bull RN  Outcome: Progressing  8/13/2023 0212 by Oh Santos RN  Outcome: Progressing     Problem: Respiratory - Adult  Goal: Achieves optimal ventilation and oxygenation  8/13/2023 1010 by Michelle Chowdary RN  Outcome: Progressing  8/13/2023 0212 by Jeremias Coleman RN  Outcome: Progressing

## 2023-08-13 NOTE — PROGRESS NOTES
Today's Date: 8/13/2023  Patient Name: Viviane Blanchard  Date of admission: 8/5/2023  9:40 PM  Patient's age: 66 y.o., 1965  Admission Dx: Shortness of breath [R06.02]  Other chest pain [R07.89]  Gastric outlet obstruction [K31.1]  Left pulmonary embolus ([address]) [I26.99]  Single subsegmental pulmonary embolism without acute cor pulmonale ([address]) [I26.93]    Reason for Consult: management recommendations  Requesting Physician: No admitting provider for patient encounter. CHIEF COMPLAINT:  abd pain     Interim history  The patient is seen and evaluated. Recovering from surgery   No active bleeding. Lovenox restarted. No fever. No new events. Pain is better controlled. HISTORY OF PRESENT ILLNESS:      The patient is a 66 y.o.  male who is admitted to the hospital for worsening abdominal pain. He is known to us with recently diagnosed pancreatic cancer. Presenting with abdominal pain, obstructive jaundice and nausea and vomiting. CT scan showed suspicious appearing mass at the head of pancreas that was confirmed by you EUS. Biopsy showed adenocarcinoma and clinical stage was T3 N0 M0. The patient underwent stenting of the common bile duct and was treated with antibiotics for pneumonia and was discharged. Now he comes in with worsening abdominal pain. CT scan showed incidentally a segmental pulmonary embolus in the left posterior medial base. No obstruction or flow limitation was appreciated. CT abdomen pelvis showed a pancreatic mass which was identified previously. The stent has good localization. It was felt that his nausea and vomiting and abdominal discomfort is due to gastric outlet obstruction. NG tube was inserted by the surgical team and they are considering gastrojejunostomy bypass.   Past Medical History:   has a past medical history of Abdominal pain, COVID-19 vaccine series completed, Duodenitis, Elevated CEA, Fatty liver, GERD (gastroesophageal reflux disease), History

## 2023-08-13 NOTE — PROGRESS NOTES
Patient continued to refuse to ambulate in the maurice throughout the day. Patient showered himself this afternoon, hooked up to the portable monitor and the TPN was discontinued. Patient requested to sign the consent to leave the floor. Patient briskly got out of bed and put his own shoes on and ambulated self down to main entrance to \"smoke his cigar\".

## 2023-08-13 NOTE — PLAN OF CARE
Problem: Respiratory - Adult  Goal: Achieves optimal ventilation and oxygenation  8/13/2023 1050 by Tiny Fairchild RCP  Flowsheets (Taken 8/13/2023 1050)  Achieves optimal ventilation and oxygenation: Respiratory therapy support as indicated

## 2023-08-13 NOTE — PROGRESS NOTES
Comprehensive Nutrition Assessment    Type and Reason for Visit:  Reassess    Nutrition Recommendations/Plan:   Continue FLD  Will send high kcal/high PRO ONS BID  TPN to continue: increase Na-acetate in next bag. Continue 250 mL 20% IL daily. If PO intake is adequate over next 24 hrs, suggest reduce next TPN rate by 50% and finish current bag, then d/c TPN. Malnutrition Assessment:  Malnutrition Status:  Severe malnutrition (08/06/23 1117)    Context:  Chronic Illness     Findings of the 6 clinical characteristics of malnutrition:  Energy Intake:  75% or less estimated energy requirements for 1 month or longer  Weight Loss:  Greater than 20% over 1 year     Body Fat Loss:   (Moderate to severe) Orbital, Triceps   Muscle Mass Loss:   (Moderate to severe) Temples (temporalis), Clavicles (pectoralis & deltoids), Hand (interosseous)  Fluid Accumulation:  No significant fluid accumulation     Strength:  Not Performed    Nutrition Assessment:    Pt diet advanced to FLD this morning, no PO intake on diet at time of writing. Pt tolerated CLD yesterday with no n/v. Per Gen Surg, PN to continue until PO intake adequate. Will send Ensure Enlive (no chocolate) BID per pt pref. Labs reviewed: Na 133 mmol/L, CO2 19 mmol/L, Mg 2.4 mg/dL. Wt fluctuation noted, will monitor, current wt consistent with admission wt. Nutrition Related Findings:    Meds/Labs reviewed. Wound Type: Surgical Incision (to abdomen)       Current Nutrition Intake & Therapies:    Average Meal Intake:  (No PO on FLD)  Average Supplements Intake: None Ordered  PN-Adult 2-in-1 Central Line (Standard)  ADULT DIET;  Full Liquid  Current Parenteral Nutrition Orders:  Type and Formula: 2-in-1 Standard   Lipids: 250ml, Daily  Duration: Continuous  Rate/Volume: 56 ml/hr  Current PN Order Provides: 1625 kcal, 90 g protein  Goal PN Orders Provides:      Anthropometric Measures:  Height: 5' 9\" (175.3 cm)  Ideal Body Weight (IBW): 160 lbs (73 kg)

## 2023-08-14 ENCOUNTER — TELEPHONE (OUTPATIENT)
Dept: PULMONOLOGY | Age: 58
End: 2023-08-14

## 2023-08-14 ENCOUNTER — TELEPHONE (OUTPATIENT)
Dept: ONCOLOGY | Age: 58
End: 2023-08-14

## 2023-08-14 ENCOUNTER — TELEPHONE (OUTPATIENT)
Dept: SURGERY | Age: 58
End: 2023-08-14

## 2023-08-14 VITALS
OXYGEN SATURATION: 98 % | TEMPERATURE: 98.3 F | DIASTOLIC BLOOD PRESSURE: 74 MMHG | HEIGHT: 69 IN | SYSTOLIC BLOOD PRESSURE: 99 MMHG | WEIGHT: 134.7 LBS | RESPIRATION RATE: 16 BRPM | HEART RATE: 66 BPM | BODY MASS INDEX: 19.95 KG/M2

## 2023-08-14 LAB
ALBUMIN SERPL-MCNC: 3.8 G/DL (ref 3.5–5.2)
ALBUMIN SERPL-MCNC: 3.9 G/DL (ref 3.5–5.2)
ALBUMIN/GLOB SERPL: 1.3 {RATIO} (ref 1–2.5)
ALBUMIN/GLOB SERPL: 1.3 {RATIO} (ref 1–2.5)
ALP SERPL-CCNC: 123 U/L (ref 40–129)
ALP SERPL-CCNC: 126 U/L (ref 40–129)
ALT SERPL-CCNC: 21 U/L (ref 5–41)
ALT SERPL-CCNC: 22 U/L (ref 5–41)
ANION GAP SERPL CALCULATED.3IONS-SCNC: 12 MMOL/L (ref 9–17)
AST SERPL-CCNC: 15 U/L
AST SERPL-CCNC: 18 U/L
BASOPHILS # BLD: 0.04 K/UL (ref 0–0.2)
BASOPHILS NFR BLD: 1 % (ref 0–2)
BILIRUB DIRECT SERPL-MCNC: 0.1 MG/DL
BILIRUB INDIRECT SERPL-MCNC: 0.2 MG/DL (ref 0–1)
BILIRUB SERPL-MCNC: 0.3 MG/DL (ref 0.3–1.2)
BILIRUB SERPL-MCNC: 0.3 MG/DL (ref 0.3–1.2)
BNP SERPL-MCNC: 48 PG/ML
BUN SERPL-MCNC: 11 MG/DL (ref 6–20)
CALCIUM SERPL-MCNC: 10.1 MG/DL (ref 8.6–10.4)
CHLORIDE SERPL-SCNC: 103 MMOL/L (ref 98–107)
CO2 SERPL-SCNC: 24 MMOL/L (ref 20–31)
CREAT SERPL-MCNC: 0.8 MG/DL (ref 0.7–1.2)
CRP SERPL HS-MCNC: 4.2 MG/L (ref 0–5)
EOSINOPHIL # BLD: 0.28 K/UL (ref 0–0.44)
EOSINOPHILS RELATIVE PERCENT: 4 % (ref 1–4)
ERYTHROCYTE [DISTWIDTH] IN BLOOD BY AUTOMATED COUNT: 13.3 % (ref 11.8–14.4)
GFR SERPL CREATININE-BSD FRML MDRD: >60 ML/MIN/1.73M2
GLUCOSE SERPL-MCNC: 111 MG/DL (ref 70–99)
HCT VFR BLD AUTO: 41.4 % (ref 40.7–50.3)
HGB BLD-MCNC: 13.5 G/DL (ref 13–17)
IMM GRANULOCYTES # BLD AUTO: 0.04 K/UL (ref 0–0.3)
IMM GRANULOCYTES NFR BLD: 1 %
INR PPP: 1
LYMPHOCYTES NFR BLD: 2.82 K/UL (ref 1.1–3.7)
LYMPHOCYTES RELATIVE PERCENT: 35 % (ref 24–43)
MAGNESIUM SERPL-MCNC: 2.3 MG/DL (ref 1.6–2.6)
MCH RBC QN AUTO: 28.3 PG (ref 25.2–33.5)
MCHC RBC AUTO-ENTMCNC: 32.6 G/DL (ref 28.4–34.8)
MCV RBC AUTO: 86.8 FL (ref 82.6–102.9)
MONOCYTES NFR BLD: 0.86 K/UL (ref 0.1–1.2)
MONOCYTES NFR BLD: 11 % (ref 3–12)
NEUTROPHILS NFR BLD: 48 % (ref 36–65)
NEUTS SEG NFR BLD: 4.03 K/UL (ref 1.5–8.1)
NRBC BLD-RTO: 0 PER 100 WBC
PHOSPHATE SERPL-MCNC: 3.5 MG/DL (ref 2.5–4.5)
PLATELET # BLD AUTO: 486 K/UL (ref 138–453)
PMV BLD AUTO: 9.3 FL (ref 8.1–13.5)
POTASSIUM SERPL-SCNC: 4.8 MMOL/L (ref 3.7–5.3)
PROCALCITONIN SERPL-MCNC: 0.09 NG/ML
PROT SERPL-MCNC: 6.8 G/DL (ref 6.4–8.3)
PROT SERPL-MCNC: 7 G/DL (ref 6.4–8.3)
PROTHROMBIN TIME: 12.6 SEC (ref 11.7–14.9)
RBC # BLD AUTO: 4.77 M/UL (ref 4.21–5.77)
SODIUM SERPL-SCNC: 139 MMOL/L (ref 135–144)
WBC OTHER # BLD: 8.1 K/UL (ref 3.5–11.3)

## 2023-08-14 PROCEDURE — 84100 ASSAY OF PHOSPHORUS: CPT

## 2023-08-14 PROCEDURE — 6360000002 HC RX W HCPCS: Performed by: SURGERY

## 2023-08-14 PROCEDURE — 99232 SBSQ HOSP IP/OBS MODERATE 35: CPT | Performed by: INTERNAL MEDICINE

## 2023-08-14 PROCEDURE — 85610 PROTHROMBIN TIME: CPT

## 2023-08-14 PROCEDURE — 84145 PROCALCITONIN (PCT): CPT

## 2023-08-14 PROCEDURE — 6370000000 HC RX 637 (ALT 250 FOR IP): Performed by: NURSE PRACTITIONER

## 2023-08-14 PROCEDURE — 83735 ASSAY OF MAGNESIUM: CPT

## 2023-08-14 PROCEDURE — C9113 INJ PANTOPRAZOLE SODIUM, VIA: HCPCS | Performed by: SURGERY

## 2023-08-14 PROCEDURE — 94640 AIRWAY INHALATION TREATMENT: CPT

## 2023-08-14 PROCEDURE — 80053 COMPREHEN METABOLIC PANEL: CPT

## 2023-08-14 PROCEDURE — 94760 N-INVAS EAR/PLS OXIMETRY 1: CPT

## 2023-08-14 PROCEDURE — 36415 COLL VENOUS BLD VENIPUNCTURE: CPT

## 2023-08-14 PROCEDURE — 2580000003 HC RX 258: Performed by: SURGERY

## 2023-08-14 PROCEDURE — 86140 C-REACTIVE PROTEIN: CPT

## 2023-08-14 PROCEDURE — 80076 HEPATIC FUNCTION PANEL: CPT

## 2023-08-14 PROCEDURE — 85025 COMPLETE CBC W/AUTO DIFF WBC: CPT

## 2023-08-14 PROCEDURE — APPSS45 APP SPLIT SHARED TIME 31-45 MINUTES: Performed by: NURSE PRACTITIONER

## 2023-08-14 PROCEDURE — 6370000000 HC RX 637 (ALT 250 FOR IP): Performed by: SURGERY

## 2023-08-14 PROCEDURE — 6370000000 HC RX 637 (ALT 250 FOR IP): Performed by: INTERNAL MEDICINE

## 2023-08-14 PROCEDURE — 99024 POSTOP FOLLOW-UP VISIT: CPT | Performed by: SURGERY

## 2023-08-14 PROCEDURE — 83880 ASSAY OF NATRIURETIC PEPTIDE: CPT

## 2023-08-14 RX ORDER — ONDANSETRON 4 MG/1
4 TABLET, FILM COATED ORAL EVERY 8 HOURS PRN
Status: DISCONTINUED | OUTPATIENT
Start: 2023-08-14 | End: 2023-08-14 | Stop reason: HOSPADM

## 2023-08-14 RX ORDER — PANTOPRAZOLE SODIUM 40 MG/1
40 TABLET, DELAYED RELEASE ORAL
Status: DISCONTINUED | OUTPATIENT
Start: 2023-08-15 | End: 2023-08-14

## 2023-08-14 RX ORDER — PANTOPRAZOLE SODIUM 40 MG/1
40 TABLET, DELAYED RELEASE ORAL
Status: DISCONTINUED | OUTPATIENT
Start: 2023-08-14 | End: 2023-08-14 | Stop reason: HOSPADM

## 2023-08-14 RX ORDER — OXYCODONE HYDROCHLORIDE 5 MG/1
5 TABLET ORAL EVERY 8 HOURS PRN
Qty: 9 TABLET | Refills: 0 | Status: SHIPPED | OUTPATIENT
Start: 2023-08-14 | End: 2023-08-17

## 2023-08-14 RX ORDER — METOCLOPRAMIDE 10 MG/1
10 TABLET ORAL
Status: DISCONTINUED | OUTPATIENT
Start: 2023-08-14 | End: 2023-08-14 | Stop reason: HOSPADM

## 2023-08-14 RX ORDER — METOCLOPRAMIDE 10 MG/1
10 TABLET ORAL
Qty: 120 TABLET | Refills: 0 | Status: SHIPPED | OUTPATIENT
Start: 2023-08-14 | End: 2023-09-13

## 2023-08-14 RX ORDER — OXYCODONE HYDROCHLORIDE 5 MG/1
5 TABLET ORAL EVERY 6 HOURS PRN
Status: DISCONTINUED | OUTPATIENT
Start: 2023-08-14 | End: 2023-08-14 | Stop reason: HOSPADM

## 2023-08-14 RX ORDER — PANTOPRAZOLE SODIUM 40 MG/1
40 TABLET, DELAYED RELEASE ORAL
Qty: 60 TABLET | Refills: 0 | Status: SHIPPED | OUTPATIENT
Start: 2023-08-14 | End: 2023-09-13

## 2023-08-14 RX ADMIN — METOCLOPRAMIDE 10 MG: 5 INJECTION, SOLUTION INTRAMUSCULAR; INTRAVENOUS at 05:41

## 2023-08-14 RX ADMIN — ENOXAPARIN SODIUM 60 MG: 100 INJECTION SUBCUTANEOUS at 08:30

## 2023-08-14 RX ADMIN — INSULIN GLARGINE 5 UNITS: 100 INJECTION, SOLUTION SUBCUTANEOUS at 08:31

## 2023-08-14 RX ADMIN — TIOTROPIUM BROMIDE INHALATION SPRAY 2 PUFF: 3.12 SPRAY, METERED RESPIRATORY (INHALATION) at 09:40

## 2023-08-14 RX ADMIN — OXYCODONE HYDROCHLORIDE 5 MG: 5 TABLET ORAL at 11:12

## 2023-08-14 RX ADMIN — NALOXEGOL OXALATE 25 MG: 12.5 TABLET, FILM COATED ORAL at 05:41

## 2023-08-14 RX ADMIN — SODIUM CHLORIDE, PRESERVATIVE FREE 10 ML: 5 INJECTION INTRAVENOUS at 08:36

## 2023-08-14 RX ADMIN — METOCLOPRAMIDE 10 MG: 10 TABLET ORAL at 11:13

## 2023-08-14 RX ADMIN — OXYCODONE HYDROCHLORIDE 10 MG: 5 SOLUTION ORAL at 01:39

## 2023-08-14 RX ADMIN — SODIUM CHLORIDE, PRESERVATIVE FREE 40 MG: 5 INJECTION INTRAVENOUS at 08:35

## 2023-08-14 RX ADMIN — METHOCARBAMOL 1000 MG: 100 INJECTION INTRAMUSCULAR; INTRAVENOUS at 05:41

## 2023-08-14 RX ADMIN — OXYCODONE HYDROCHLORIDE 10 MG: 5 SOLUTION ORAL at 05:41

## 2023-08-14 ASSESSMENT — PAIN DESCRIPTION - PAIN TYPE: TYPE: ACUTE PAIN

## 2023-08-14 ASSESSMENT — PAIN DESCRIPTION - ORIENTATION
ORIENTATION: RIGHT;LEFT;UPPER;LOWER
ORIENTATION: RIGHT;LEFT;LOWER;UPPER
ORIENTATION: MID

## 2023-08-14 ASSESSMENT — PAIN DESCRIPTION - DESCRIPTORS
DESCRIPTORS: ACHING;DISCOMFORT
DESCRIPTORS: ACHING;DISCOMFORT
DESCRIPTORS: ACHING

## 2023-08-14 ASSESSMENT — PAIN SCALES - GENERAL
PAINLEVEL_OUTOF10: 10
PAINLEVEL_OUTOF10: 10
PAINLEVEL_OUTOF10: 1
PAINLEVEL_OUTOF10: 10

## 2023-08-14 ASSESSMENT — PAIN DESCRIPTION - LOCATION
LOCATION: ABDOMEN

## 2023-08-14 ASSESSMENT — PAIN - FUNCTIONAL ASSESSMENT: PAIN_FUNCTIONAL_ASSESSMENT: ACTIVITIES ARE NOT PREVENTED

## 2023-08-14 NOTE — DISCHARGE INSTR - DIET

## 2023-08-14 NOTE — CARE COORDINATION
Transitional planning      Per NP Brian Gone, patient would like shelter placement, SW consult placed. 36 writer to room to discuss d/c plan, plan is possible shelter, SW to work on placement.

## 2023-08-14 NOTE — PLAN OF CARE
Problem: Respiratory - Adult  Goal: Achieves optimal ventilation and oxygenation  8/14/2023 0945 by Xiomy Bryant RCP  Flowsheets (Taken 8/14/2023 0945)  Achieves optimal ventilation and oxygenation: Respiratory therapy support as indicated

## 2023-08-14 NOTE — TELEPHONE ENCOUNTER
received referral from Nurse Navigator stating patient scheduled for hospital follow up on 8/16. Patient needing transportation assistance and currently admitted.  called Inpatient  to check discharge destination. SWer states call placed to [address] to check availability but patient left hospital before being able to touch base with him on destination and provide 's number.

## 2023-08-14 NOTE — TELEPHONE ENCOUNTER
Call to Nurse Navigator Mc Sauceda informing her of patient's discharge from [facility]. Phone call then made to pt at [facility] regarding his discharged plan. Discussed followup appt with Dr. Eboni Narayan and transportation needs. Informed pt that a social work referral was made for pt to discuss transportation needs. Writer also spoke to Kimmie Burrell to discuss transportation and housing resources for pt. Per Seda Campo pt will be d/c with adequate amount of prescriptions. (Pt will be on a DVT prophylaxis). Pt reports no additional barriers at this time.

## 2023-08-14 NOTE — PROGRESS NOTES
I saw and evaluated the patient, performing the key elements of the service. I discussed the findings, assessment and plan with the nurse practitioner/resident and agree with the their findings and plan as documented in the their note. Postop day 4 with robotic GJ for gastric outlet obstruction due to [address]  H/H stable, therapeutic AC  Feels better, no N/V  FLD for one week  HOB 60 degrees, he needs to sit up in the chair when drinking  Multimodality pain control  D/c on Reglan, zofran and PPI  Unfortunately he is going to a shelter, it will be very hard to follow-up with him, we will schedule him with medical oncology as outpatient. We asked him to call my office in 2 weeks to check on him. He claims this will be very difficult for him to come to clinic. Time spent caring for patient 25 minutes    I spent greater than 50% of the visit coordinating care and answering all questions from patient, and family members, reviewing the possible outcome of the treatment. Reji Etienne MD [facility]  Surgical Oncology/[facility] Surgery  [address] [place] [address] [facility]  [address] [place] [address]. [address]  [facility] [address]  Office:  [phone]  Fax:  [phone]  8/14/2023  8:52 AM

## 2023-08-14 NOTE — TELEPHONE ENCOUNTER
Name: Ruy Peterson  : 1965  MRN: [id number]    Oncology Navigation Follow-Up Note    Contact Type:  Telephone    Notes: Munira Sim, Dr. Beulah Pino nurse, called stating pt to be dc'd from [facility] & requested Dr. Ajay Sexton post hospital f/u scheduled. Stanton Chaidez [facility] , updated on pt & requested appt. Pt scheduled  @ 1015. Elizabeth updated on Dr. Ajay Sexton appt details & inquired on transportation. Munira Sim stated will update Brittany Ramsay CNP, Dr. Beulah Pino office, on appt details & inquire if transportation needs to be coordinated. Will continue to follow.     Electronically signed by Demetra Flores RN on 2023 at 9:50 AM

## 2023-08-14 NOTE — DISCHARGE SUMMARY
guidance. The sheath was removed and hemostasis was achieved with manual compression. A sterile dressing was applied. Patient tolerated procedure well. Complications: No immediate complications. Standardized report: [id number] FINDINGS: US: The access femoral vein is patent. Inferior Vena Cavogram: The vena cava is patent, dilated and significantly compressed by known pancreatic mass; no thrombus or other anomalies detected. Post-placement imaging: Spot filmdemonstrates the filter in the infrarenalvena cava, with its cone tip lower L2 demonstrated from the vena cava access. Successful permanent vena cava filter placement, as above. PLAN: Retrieval intent (MIPS): The filter is not retrievable. XR ABDOMEN FOR NG/OG/NE TUBE PLACEMENT    Result Date: 8/9/2023  EXAMINATION: ONE SUPINE XRAY VIEW(S) OF THE ABDOMEN 8/9/2023 8:08 pm COMPARISON: 08/08/2023 HISTORY: ORDERING SYSTEM PROVIDED HISTORY: assess ngt placement FINDINGS: The enteric catheter extends to the stomach body. IVC filter is seen. CBD stent remains in place. Oral contrast seen in the colon. NG tube extends to the stomach body. XR ABDOMEN FOR NG/OG/NE TUBE PLACEMENT    Result Date: 8/8/2023  EXAMINATION: ONE SUPINE XRAY VIEW(S) OF THE ABDOMEN 8/8/2023 3:55 pm COMPARISON: 08/06/2023 HISTORY: ORDERING SYSTEM PROVIDED HISTORY: confirm ng tube replacement TECHNOLOGIST PROVIDED HISTORY: confirm ng tube replacement Portable? ->Yes FINDINGS: Nonobstructive bowel gas pattern. No abnormal calcification. No evidence of pneumoperitoneum. Normal visualized lung bases. Partially visualized right central line near the cavoatrial junction. Enteric tube tip in the proximal gastric body with the side port at the level of the GE junction. Biliary stent. IVC filter. Enteric tube courses below the GE junction with the side port at the level of the GE junction. Consideration for advancement by 5 cm recommended. Tasha Florentino DISCHARGE INSTRUCTIONS     Discharge Medications:        Medication List        START taking these medications      apixaban 5 MG Tabs tablet  Commonly known as: ELIQUIS  Take 1 tablet by mouth 2 times daily     metoclopramide 10 MG tablet  Commonly known as: REGLAN  Take 1 tablet by mouth 4 times daily (before meals and nightly)     oxyCODONE 5 MG immediate release tablet  Commonly known as: ROXICODONE  Take 1 tablet by mouth every 8 hours as needed for Pain for up to 3 days. Max Daily Amount: 15 mg            CONTINUE taking these medications      folic acid 1 MG tablet  Commonly known as: FOLVITE  Take 1 tablet by mouth daily     pantoprazole 40 MG tablet  Commonly known as: PROTONIX  Take 1 tablet by mouth 2 times daily (before meals)            ASK your doctor about these medications      lipase-protease-amylase 22374-19834 units Cpep delayed release capsule  Commonly known as: ZENPEP  Take 1 capsule by mouth 3 times daily (with meals)     thiamine 100 MG tablet  Take 1 tablet by mouth daily               Where to Get Your Medications        You can get these medications from any pharmacy    Bring a paper prescription for each of these medications  apixaban 5 MG Tabs tablet  metoclopramide 10 MG tablet  oxyCODONE 5 MG immediate release tablet  pantoprazole 40 MG tablet       Diet: ADULT DIET; Full Liquid  ADULT ORAL NUTRITION SUPPLEMENT; Breakfast, Dinner; Standard High Calorie/High Protein Oral Supplement diet as tolerated  BATHING: May shower  DRIVING: No driving on narcotics  WALKING:  Yes  STAIRS:  Yes  LIFTING: Less than 10-15 pounds  DIET: Full liquid diet for 1 week     DISPOSITION: Home with request for shelter placement (appreciate CM and SW assistance for this)     Follow-up:  Kennedy Graham MD  [address]  [address] [place] [address]  [phone]    Call today  Krissy Hernandez [phone] in 2 weeks to check in and to follow-up on your symptoms.     Mika Kwok

## 2023-08-14 NOTE — PROGRESS NOTES
Patient took paper prescriptions to our pharmacy. Girish Waggoner with social work dropped of patients bus fare and was going to update him on a shelter. 1400- Dr. Desir Gone office called to update on an appointment made for patient on 11-10-23 @ [phone]- writing nurse and Girish Waggoner with social work walked to outpatient pharmacy and around the main entrance, patient was not there. Spoke to the out patient pharmacy and they claimed that the patient never stopped down there.      Girish Waggoner also provided a number for the cancer center  [facility] [phone]

## 2023-08-14 NOTE — PROGRESS NOTES
Comprehensive Nutrition Assessment    Type and Reason for Visit:  Reassess    Nutrition Recommendations/Plan:   Recommend continue high calorie/high protein supplement following discharge if possible  Monitor intake, patient not meeting nutrition needs at this time. Malnutrition Assessment:  Malnutrition Status:  Severe malnutrition (08/06/23 1117)    Context:  Chronic Illness     Findings of the 6 clinical characteristics of malnutrition:  Energy Intake:  75% or less estimated energy requirements for 1 month or longer  Weight Loss:  Greater than 20% over 1 year     Body Fat Loss:   (Moderate to severe) Orbital, Triceps   Muscle Mass Loss:   (Moderate to severe) Temples (temporalis), Clavicles (pectoralis & deltoids), Hand (interosseous)  Fluid Accumulation:  No significant fluid accumulation     Strength:  Not Performed    Nutrition Assessment:    Patient seen for follow up. Noted TPN discontinued yesterday without notifying dietitian. Patient is only consuming 26-50% of FLD, not meeting nutrition needs at this time. Plan to discharge today per attending physician. Patient discharging to shelter on full liquid diet, patient is unlikely to be able to follow a full liquid diet at shelter. Labs/meds reviewed. Nutrition Related Findings:    Meds/Labs reviewed. Wound Type: Surgical Incision (to abdomen)       Current Nutrition Intake & Therapies:    Average Meal Intake: 26-50%  Average Supplements Intake: 1-25%  ADULT DIET; Full Liquid  ADULT ORAL NUTRITION SUPPLEMENT; Breakfast, Dinner; Standard High Calorie/High Protein Oral Supplement    Anthropometric Measures:  Height: 5' 9\" (175.3 cm)  Ideal Body Weight (IBW): 160 lbs (73 kg)    Admission Body Weight: 134 lb 7.7 oz (61 kg) (8/6)  Current Body Weight: 135 lb 9.3 oz (61.5 kg) (8/13), 84.7 % IBW.  Weight Source: Standing Scale  Current BMI (kg/m2): 20  Usual Body Weight: 176 lb (79.8 kg) (pt report)  % Weight Change (Calculated): -23.6  Weight Adjustment

## 2023-08-14 NOTE — PLAN OF CARE
Problem: Discharge Planning  Goal: Discharge to home or other facility with appropriate resources  8/13/2023 2244 by Enrrique Phoenix RN  Outcome: Progressing  8/13/2023 1010 by Milton Guzman RN  Outcome: Progressing     Problem: Pain  Goal: Verbalizes/displays adequate comfort level or baseline comfort level  8/13/2023 2244 by Enrrique Phoenix RN  Outcome: Progressing  8/13/2023 1010 by Milton Guzman RN  Outcome: Progressing     Problem: ABCDS Injury Assessment  Goal: Absence of physical injury  8/13/2023 2244 by Enrrique Phoenix RN  Outcome: Progressing  8/13/2023 1010 by Milton Guzman RN  Outcome: Progressing     Problem: Safety - Adult  Goal: Free from fall injury  8/13/2023 2244 by Enrrique Phoenix RN  Outcome: Progressing  8/13/2023 1010 by Milton Guzman RN  Outcome: Progressing     Problem: Nutrition Deficit:  Goal: Optimize nutritional status  8/13/2023 2244 by Enrrique Phoenix RN  Outcome: Progressing  8/13/2023 1010 by Milton Guzman RN  Outcome: Progressing  Flowsheets (Taken 8/13/2023 0858 by Hilton Ochoa, MS, RD, LD)  Nutrient intake appropriate for improving, restoring, or maintaining nutritional needs:   Assess nutritional status and recommend course of action   Monitor oral intake, labs, and treatment plans   Recommend appropriate diets, oral nutritional supplements, and vitamin/mineral supplements   Recommend, monitor, and adjust tube feedings and TPN/PPN based on assessed needs     Problem: Skin/Tissue Integrity  Goal: Absence of new skin breakdown  Description: 1. Monitor for areas of redness and/or skin breakdown  2. Assess vascular access sites hourly  3. Every 4-6 hours minimum:  Change oxygen saturation probe site  4. Every 4-6 hours:  If on nasal continuous positive airway pressure, respiratory therapy assess nares and determine need for appliance change or resting period.   8/13/2023 2244 by Enrrique Phoenix RN  Outcome: Progressing  8/13/2023 1010 by Milton Guzman

## 2023-08-14 NOTE — PLAN OF CARE
Problem: Discharge Planning  Goal: Discharge to home or other facility with appropriate resources  8/14/2023 1411 by Marbin Oseguera RN  Outcome: Completed  8/14/2023 1217 by Marbin Oseguera RN  Outcome: Progressing     Problem: Pain  Goal: Verbalizes/displays adequate comfort level or baseline comfort level  8/14/2023 1411 by Marbin Oseguera RN  Outcome: Completed  8/14/2023 1217 by Marbin Oseguera RN  Outcome: Progressing     Problem: ABCDS Injury Assessment  Goal: Absence of physical injury  8/14/2023 1411 by Marbin Oseguera RN  Outcome: Completed  8/14/2023 1217 by Marbin Oseguera RN  Outcome: Progressing     Problem: Safety - Adult  Goal: Free from fall injury  8/14/2023 1411 by Marbin Oseguera RN  Outcome: Completed  8/14/2023 1217 by Marbin Oseguera RN  Outcome: Progressing     Problem: Nutrition Deficit:  Goal: Optimize nutritional status  8/14/2023 1411 by Marbin Oseguera RN  Outcome: Completed  8/14/2023 1217 by Marbin Oseguera RN  Outcome: Progressing  Flowsheets (Taken 8/14/2023 0941 by Helen Alicea RD)  Nutrient intake appropriate for improving, restoring, or maintaining nutritional needs:   Assess nutritional status and recommend course of action   Monitor oral intake, labs, and treatment plans   Recommend appropriate diets, oral nutritional supplements, and vitamin/mineral supplements     Problem: Skin/Tissue Integrity  Goal: Absence of new skin breakdown  Description: 1. Monitor for areas of redness and/or skin breakdown  2. Assess vascular access sites hourly  3. Every 4-6 hours minimum:  Change oxygen saturation probe site  4. Every 4-6 hours:  If on nasal continuous positive airway pressure, respiratory therapy assess nares and determine need for appliance change or resting period.   8/14/2023 1411 by Marbin Oseguera RN  Outcome: Completed  8/14/2023 1217 by Marbin Oseguera RN  Outcome: Progressing     Problem: Respiratory - Adult  Goal: Achieves optimal ventilation and

## 2023-08-14 NOTE — PROGRESS NOTES
Discharge instructions given. All patient questions are answered. Paper prescriptions provided with a coupon for a 30 day supply of Eliquis. PICC line removed and dressing applied, no issues. Instructed patient to leave dressing on for 24 hours. Awaiting social work to provide bus fare otherwise patient is ready to discharge.

## 2023-08-14 NOTE — PLAN OF CARE
Problem: Discharge Planning  Goal: Discharge to home or other facility with appropriate resources  8/14/2023 1217 by Leatha Vallecillo RN  Outcome: Progressing  8/13/2023 2244 by Alex Alberts RN  Outcome: Progressing     Problem: Pain  Goal: Verbalizes/displays adequate comfort level or baseline comfort level  8/14/2023 1217 by Leatha Vallecillo RN  Outcome: Progressing  8/13/2023 2244 by Alex Alberts RN  Outcome: Progressing     Problem: ABCDS Injury Assessment  Goal: Absence of physical injury  8/14/2023 1217 by Leatah Vallecillo RN  Outcome: Progressing  8/13/2023 2244 by Alex Alberts RN  Outcome: Progressing     Problem: Safety - Adult  Goal: Free from fall injury  8/14/2023 1217 by Leatha Vallecillo RN  Outcome: Progressing  8/13/2023 2244 by Alex Alberts RN  Outcome: Progressing     Problem: Nutrition Deficit:  Goal: Optimize nutritional status  8/14/2023 1217 by Leatha Vallecillo RN  Outcome: Progressing  Flowsheets (Taken 8/14/2023 0941 by Jayson Clay RD)  Nutrient intake appropriate for improving, restoring, or maintaining nutritional needs:   Assess nutritional status and recommend course of action   Monitor oral intake, labs, and treatment plans   Recommend appropriate diets, oral nutritional supplements, and vitamin/mineral supplements  8/13/2023 2244 by Alex Alberts RN  Outcome: Progressing     Problem: Skin/Tissue Integrity  Goal: Absence of new skin breakdown  Description: 1. Monitor for areas of redness and/or skin breakdown  2. Assess vascular access sites hourly  3. Every 4-6 hours minimum:  Change oxygen saturation probe site  4. Every 4-6 hours:  If on nasal continuous positive airway pressure, respiratory therapy assess nares and determine need for appliance change or resting period.   8/14/2023 1217 by Leatha Vallecillo RN  Outcome: Progressing  8/13/2023 2244 by Alex Alberts RN  Outcome: Progressing     Problem: Respiratory - Adult  Goal: Achieves optimal ventilation and oxygenation  8/14/2023 1217 by Milton Guzman RN  Outcome: Progressing  8/14/2023 0945 by Cynthia Yao RCP  Flowsheets (Taken 8/14/2023 0945)  Achieves optimal ventilation and oxygenation: Respiratory therapy support as indicated  8/13/2023 2244 by Enrrique Phoenix RN  Outcome: Progressing

## 2023-08-14 NOTE — CARE COORDINATION
Received social work consult for shelter placement. Met with pt to complete assessment and offer resources. Pt states that he is currently living in a shared house but does not want to return due to the home being dirty. He states that he has paid this months rent so he is able to stay until the end of the month. At that point pt plans on paying for storage but wants to go to a shelter. Provided pt with shelter list.  Pt advised to call 211 to be placed on waiting list for [facility]. Confirmed with Leonardo Dozier at [address] that beds are available and pt is able to come to the shelter. Attempted to go to floor to provide pt with bus card for transport home and number to follow up with Julio César Astudillo,  at the cancer center however, pt left unit to go to pharmacy and did not return to unit. Bus pas and number for Julio César Astudillo left with RN in case pt returned.

## 2023-08-15 LAB — SURGICAL PATHOLOGY REPORT: NORMAL

## 2023-08-16 ENCOUNTER — CLINICAL DOCUMENTATION (OUTPATIENT)
Dept: ONCOLOGY | Age: 58
End: 2023-08-16

## 2023-08-16 ENCOUNTER — TELEPHONE (OUTPATIENT)
Dept: SURGERY | Age: 58
End: 2023-08-16

## 2023-08-16 ENCOUNTER — TELEPHONE (OUTPATIENT)
Dept: ONCOLOGY | Age: 58
End: 2023-08-16

## 2023-08-16 ENCOUNTER — OFFICE VISIT (OUTPATIENT)
Dept: ONCOLOGY | Age: 58
End: 2023-08-16
Payer: MEDICARE

## 2023-08-16 VITALS
SYSTOLIC BLOOD PRESSURE: 107 MMHG | DIASTOLIC BLOOD PRESSURE: 77 MMHG | WEIGHT: 131.7 LBS | HEART RATE: 98 BPM | BODY MASS INDEX: 19.45 KG/M2 | TEMPERATURE: 98.7 F | RESPIRATION RATE: 18 BRPM | OXYGEN SATURATION: 100 %

## 2023-08-16 DIAGNOSIS — C25.0 MALIGNANT NEOPLASM OF HEAD OF PANCREAS (HCC): ICD-10-CM

## 2023-08-16 DIAGNOSIS — K83.1 OBSTRUCTIVE JAUNDICE: ICD-10-CM

## 2023-08-16 DIAGNOSIS — C25.9 PANCREATIC ADENOCARCINOMA (HCC): Primary | ICD-10-CM

## 2023-08-16 DIAGNOSIS — C25.0 MALIGNANT NEOPLASM OF HEAD OF PANCREAS (HCC): Primary | ICD-10-CM

## 2023-08-16 DIAGNOSIS — K31.1 GASTRIC OUTLET OBSTRUCTION: ICD-10-CM

## 2023-08-16 DIAGNOSIS — I26.93 SINGLE SUBSEGMENTAL PULMONARY EMBOLISM WITHOUT ACUTE COR PULMONALE (HCC): ICD-10-CM

## 2023-08-16 PROCEDURE — G8427 DOCREV CUR MEDS BY ELIG CLIN: HCPCS | Performed by: INTERNAL MEDICINE

## 2023-08-16 PROCEDURE — 99215 OFFICE O/P EST HI 40 MIN: CPT | Performed by: INTERNAL MEDICINE

## 2023-08-16 PROCEDURE — 3017F COLORECTAL CA SCREEN DOC REV: CPT | Performed by: INTERNAL MEDICINE

## 2023-08-16 PROCEDURE — 1036F TOBACCO NON-USER: CPT | Performed by: INTERNAL MEDICINE

## 2023-08-16 PROCEDURE — 1111F DSCHRG MED/CURRENT MED MERGE: CPT | Performed by: INTERNAL MEDICINE

## 2023-08-16 PROCEDURE — G8420 CALC BMI NORM PARAMETERS: HCPCS | Performed by: INTERNAL MEDICINE

## 2023-08-16 NOTE — TELEPHONE ENCOUNTER
Phone call from Magalys Murray, nurse navigator that pt showed for his appt with Dr. Dann Vidal today and that Dr. Dann Vidal will be reviewing the biopsy results with him. Writer inquired about continued transportation needs and NN reports that now that he is here it can be taken care of. Writer asked about cell phone situation and NN reports she was given a phone number by pt of the pt's landlord to only be used for emergencies. Pt does have a medicaid phone but his friend broke it and he is not eligible to get another one. NN also reports she made a referral to [employer] for cancer patients. none

## 2023-08-16 NOTE — PROGRESS NOTES
[facility]  Initial Nutrition Assessment    Gurdeep Delgadillo is a 66 y.o.  male     Reason for Evaluation: RN Navigator referral    NUTRITION RECOMMENDATIONS / MONITORING / EVALUATION  Encouraged meal intake plus frequent snacks, as tolerated/able. Several oral nutrition supplements provided at visit. Will monitor PO tolerance/adequacy of intake, weight, and pt care plans. Subjective/Current Data:  Referral received from nurse navigator, Veena Colin. 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 H/o obstructive jaundice, gastric outlet obstruction. Writer met with patient after medical oncology appointment with Dr. Lovey Schilder. Pt reports he has a good appetite at present and is tolerating diet fairly well. Does report some mild abdominal pain if he eats too large of a meal. Pt currently resides at a shelter. Is receiving about 1-2 meals/day and occasional food supply from local rey/pantries. Pt reports he has lost a lot of weight over the past 3 months. Used to weigh 175 lb and is now 131 lbs (25% loss). Ensure Complete supplements provided at visit today.       Past Medical History:  Past Medical History:   Diagnosis Date    Abdominal pain 06/19/2023    COVID-19 vaccine series completed     Duodenitis 06/19/2023    Elevated CEA 06/19/2023    Fatty liver     GERD (gastroesophageal reflux disease) 05/05/2016    History of recent hospitalization 06/19/2023    til 6/20/2023    Retroperitoneal mass 06/19/2023    Weight loss 06/19/2023     Past Surgical History:   Procedure Laterality Date    ERCP N/A 07/24/2023    ERCP STENT INSERTION performed by Darrion Sands MD at [address] [place] [address]  08/09/2023    XI ROBOTIC LAPAROSCOPIC GASTROJEJUNOSTOMY ANASTOMOSIS, SURGICAL EGD, LIVER BIOPSY  (PRE-OP TAP BLOCK)    IR IVC FILTER PLACEMENT W IMAGING  08/08/2023    IR IVC FILTER PLACEMENT W IMAGING 8/8/2023 Donna Peterson MD [facility]

## 2023-08-16 NOTE — TELEPHONE ENCOUNTER
AVS from 8/16/23    PET/CT  Referral for Port-A-Cath by IR  Tentative chemotherapy start in 2 weeks with FOLFIRINOX  Treatment to start at [address] sched for [email or web address]    *rv is sched for STA avs faxed  *port placement [email or web address]  *emailed chemo sched for tx at [address] [place] [address] St     PT was given AVS and appt schedule

## 2023-08-16 NOTE — PROGRESS NOTES
Patient ID: Mima Arriola, 1965, [id number], 66 y.o. Referred by :  No ref. provider found   Reason for consultation: Metastatic pancreaticobiliary adenocarcinoma      HISTORY OF PRESENT ILLNESS:    Oncologic History:    Mima Arriola is a very pleasant 66 y.o. male. With history of fatty liver who I did see him in the hospital back in July 2023 when he presented to [facility] with abdominal pain, N/V, obstructive jaundice and failure to thrive. CT abdomen and pelvis 7/21/2023 showed an ill-defined suspicious appearing mass in the head of the pancreas extending into the pancreaticoduodenal groove, pancreatitis changes and double duct sign/biliary obstruction. He also has evidence of three-vessel coronary artery calcification and mild emphysema. CA 19-9 elevation was 70 (7/22/2023). Subsequently he underwent EUS on 7/24/2023 by Dr. Daniele Orozco which demonstrated a pancreatic head mass and portal vein abutment T3 N0 MX. Biopsy demonstrated poorly diff pancreatic adenocarcinoma. ERCP demonstrated distal high grade stricture with upstream biliary dilatation and a metal stent was placed. Following that, his LFTs improved. During admission he was found to have pneumonia and was started on antibiotics. He was discharged on July 28, 2023 presented back to the hospital early August with abdominal pain  CT scan showed incidentally a segmental pulmonary embolus in the left posterior medial base. No obstruction or flow limitation was appreciated. CT abdomen pelvis showed a pancreatic mass which was identified previously. The stent has good localization. It was felt that his nausea and vomiting and abdominal discomfort is due to gastric outlet obstruction status post gastrojejunostomy bypass.   Liver biopsy was done and did show metastatic adenocarcinoma consistent with pancreatic biliary  He was discharged on August 14, 2023    Problem list  Metastatic adenocarcinoma of the pancreas with liver mets stage T3

## 2023-08-16 NOTE — TELEPHONE ENCOUNTER
Name: Moshe Taylor  : 1965  MRN: [id number]    Oncology Navigation- Initial Note:    Intake-  Contact Type: Telephone    Continuum of Care: Diagnosis/Active Treatment    Notes: Pt @ [facility] for Dr. Courtney Modi f/u. Met w/pt prior to f/u & notified pt writer will be navigating oncology care. Discussed potential barriers to care, pt c/o wt loss. Pt stated current weight 131# down from 175#. Pt stated did not go to homeless shelter r/t kailee cleaned up house on [address] in [place]. Inquired on contact #, pt stated issued [employer] mobile phone, friends broke, & unable to receive another. Requested pt contact  @ [facility] to update on new cancer dx & inquire on receiving another phone. Offered pt to use [facility] phone to contact. Pt declined & stated will contact later. Pt stated okay to contact Evans dugan, @ [phone] prn. Inquired how pt arrived today w/transportation, pt stated contacted [employer]. Pt stated previous application completed D/489 for transportation. Pt stated receives SSDI check once per month. Discussed community resources & offered referral to [employer], pt agreeable. [facility] post card given to pt to contact, secure e-mail sent to [facility], notified of referral, & notified pt to contact pantry. Discussed smoking/alcohol/drug use. Pt stated quit smoking cigarettes 7 years ago & occasionally smokes cigars. Pt stated former alcohol/drug use & quit years ago. Discussed  smoking cessation referral, pt declined @ this time. Smoking cessation assistance and resources provided. Notified pt writer will request [facility] dietician, meet w/pt today. Dr. Courtney Modi completed f/u & Lary Jack met w/pt. Sarah Ellison, [facility] check out, scheduled PET/CT & port placement. Pt stated will arrange transportation. Notified pt will need adult  for port placement. Pt verbalized understanding & denied questions/concerns. Encouraged pt to contact writer prn.   Zaida alerted

## 2023-08-23 ENCOUNTER — APPOINTMENT (OUTPATIENT)
Dept: GENERAL RADIOLOGY | Age: 58
End: 2023-08-23
Payer: MEDICARE

## 2023-08-23 ENCOUNTER — TELEPHONE (OUTPATIENT)
Dept: ONCOLOGY | Age: 58
End: 2023-08-23

## 2023-08-23 ENCOUNTER — CARE COORDINATION (OUTPATIENT)
Dept: CARE COORDINATION | Age: 58
End: 2023-08-23

## 2023-08-23 ENCOUNTER — HOSPITAL ENCOUNTER (INPATIENT)
Age: 58
LOS: 1 days | Discharge: HOME | End: 2023-08-24
Attending: EMERGENCY MEDICINE | Admitting: STUDENT IN AN ORGANIZED HEALTH CARE EDUCATION/TRAINING PROGRAM
Payer: MEDICARE

## 2023-08-23 ENCOUNTER — APPOINTMENT (OUTPATIENT)
Dept: CT IMAGING | Age: 58
End: 2023-08-23
Payer: MEDICARE

## 2023-08-23 DIAGNOSIS — K85.90 ACUTE PANCREATITIS, UNSPECIFIED COMPLICATION STATUS, UNSPECIFIED PANCREATITIS TYPE: ICD-10-CM

## 2023-08-23 DIAGNOSIS — R10.13 EPIGASTRIC PAIN: Primary | ICD-10-CM

## 2023-08-23 DIAGNOSIS — G89.3 CANCER-RELATED BREAKTHROUGH PAIN: ICD-10-CM

## 2023-08-23 PROBLEM — C78.7 PANCREATIC CANCER METASTASIZED TO LIVER (HCC): Status: ACTIVE | Noted: 2023-07-28

## 2023-08-23 PROBLEM — K59.03 DRUG-INDUCED CONSTIPATION: Status: ACTIVE | Noted: 2023-08-23

## 2023-08-23 PROBLEM — R74.8 ELEVATED LIPASE: Status: ACTIVE | Noted: 2023-08-23

## 2023-08-23 LAB
ALBUMIN SERPL-MCNC: 3.9 G/DL (ref 3.5–5.2)
ALBUMIN/GLOB SERPL: 1.3 {RATIO} (ref 1–2.5)
ALP SERPL-CCNC: 114 U/L (ref 40–129)
ALT SERPL-CCNC: 20 U/L (ref 5–41)
ANION GAP SERPL CALCULATED.3IONS-SCNC: 11 MMOL/L (ref 9–17)
ANION GAP SERPL CALCULATED.3IONS-SCNC: 8 MMOL/L (ref 9–17)
AST SERPL-CCNC: 18 U/L
BASOPHILS # BLD: 0.08 K/UL (ref 0–0.2)
BASOPHILS NFR BLD: 1 % (ref 0–2)
BILIRUB SERPL-MCNC: 0.3 MG/DL (ref 0.3–1.2)
BUN SERPL-MCNC: 11 MG/DL (ref 6–20)
BUN SERPL-MCNC: 8 MG/DL (ref 6–20)
CA-I BLD-SCNC: 1.15 MMOL/L (ref 1.13–1.33)
CALCIUM SERPL-MCNC: 8.8 MG/DL (ref 8.6–10.4)
CALCIUM SERPL-MCNC: 9.4 MG/DL (ref 8.6–10.4)
CHLORIDE SERPL-SCNC: 101 MMOL/L (ref 98–107)
CHLORIDE SERPL-SCNC: 104 MMOL/L (ref 98–107)
CO2 SERPL-SCNC: 24 MMOL/L (ref 20–31)
CO2 SERPL-SCNC: 27 MMOL/L (ref 20–31)
CREAT SERPL-MCNC: 0.7 MG/DL (ref 0.7–1.2)
CREAT SERPL-MCNC: 0.8 MG/DL (ref 0.7–1.2)
EOSINOPHIL # BLD: 0.35 K/UL (ref 0–0.44)
EOSINOPHILS RELATIVE PERCENT: 3 % (ref 1–4)
ERYTHROCYTE [DISTWIDTH] IN BLOOD BY AUTOMATED COUNT: 14.1 % (ref 11.8–14.4)
ERYTHROCYTE [DISTWIDTH] IN BLOOD BY AUTOMATED COUNT: 14.1 % (ref 11.8–14.4)
GFR SERPL CREATININE-BSD FRML MDRD: >60 ML/MIN/1.73M2
GFR SERPL CREATININE-BSD FRML MDRD: >60 ML/MIN/1.73M2
GLUCOSE SERPL-MCNC: 102 MG/DL (ref 70–99)
GLUCOSE SERPL-MCNC: 122 MG/DL (ref 70–99)
HCT VFR BLD AUTO: 34 % (ref 40.7–50.3)
HCT VFR BLD AUTO: 37.8 % (ref 40.7–50.3)
HGB BLD-MCNC: 11.2 G/DL (ref 13–17)
HGB BLD-MCNC: 12.3 G/DL (ref 13–17)
IMM GRANULOCYTES # BLD AUTO: 0.04 K/UL (ref 0–0.3)
IMM GRANULOCYTES NFR BLD: 0 %
LIPASE SERPL-CCNC: 489 U/L (ref 13–60)
LYMPHOCYTES NFR BLD: 3.28 K/UL (ref 1.1–3.7)
LYMPHOCYTES RELATIVE PERCENT: 25 % (ref 24–43)
MAGNESIUM SERPL-MCNC: 2 MG/DL (ref 1.6–2.6)
MCH RBC QN AUTO: 28.9 PG (ref 25.2–33.5)
MCH RBC QN AUTO: 29.2 PG (ref 25.2–33.5)
MCHC RBC AUTO-ENTMCNC: 32.5 G/DL (ref 28.4–34.8)
MCHC RBC AUTO-ENTMCNC: 32.9 G/DL (ref 28.4–34.8)
MCV RBC AUTO: 88.5 FL (ref 82.6–102.9)
MCV RBC AUTO: 88.7 FL (ref 82.6–102.9)
MONOCYTES NFR BLD: 1.08 K/UL (ref 0.1–1.2)
MONOCYTES NFR BLD: 8 % (ref 3–12)
NEUTROPHILS NFR BLD: 63 % (ref 36–65)
NEUTS SEG NFR BLD: 8.58 K/UL (ref 1.5–8.1)
NRBC BLD-RTO: 0 PER 100 WBC
NRBC BLD-RTO: 0 PER 100 WBC
PHOSPHATE SERPL-MCNC: 3.3 MG/DL (ref 2.5–4.5)
PLATELET # BLD AUTO: 372 K/UL (ref 138–453)
PLATELET # BLD AUTO: 417 K/UL (ref 138–453)
PMV BLD AUTO: 8.8 FL (ref 8.1–13.5)
PMV BLD AUTO: 9 FL (ref 8.1–13.5)
POTASSIUM SERPL-SCNC: 4.2 MMOL/L (ref 3.7–5.3)
POTASSIUM SERPL-SCNC: 4.6 MMOL/L (ref 3.7–5.3)
PROT SERPL-MCNC: 6.9 G/DL (ref 6.4–8.3)
RBC # BLD AUTO: 3.84 M/UL (ref 4.21–5.77)
RBC # BLD AUTO: 4.26 M/UL (ref 4.21–5.77)
SODIUM SERPL-SCNC: 136 MMOL/L (ref 135–144)
SODIUM SERPL-SCNC: 139 MMOL/L (ref 135–144)
TRIGL SERPL-MCNC: 65 MG/DL
TROPONIN I SERPL HS-MCNC: 10 NG/L (ref 0–22)
TROPONIN I SERPL HS-MCNC: 9 NG/L (ref 0–22)
WBC OTHER # BLD: 10.7 K/UL (ref 3.5–11.3)
WBC OTHER # BLD: 13.4 K/UL (ref 3.5–11.3)

## 2023-08-23 PROCEDURE — 6370000000 HC RX 637 (ALT 250 FOR IP): Performed by: NURSE PRACTITIONER

## 2023-08-23 PROCEDURE — 74177 CT ABD & PELVIS W/CONTRAST: CPT

## 2023-08-23 PROCEDURE — 96374 THER/PROPH/DIAG INJ IV PUSH: CPT

## 2023-08-23 PROCEDURE — 93005 ELECTROCARDIOGRAM TRACING: CPT | Performed by: EMERGENCY MEDICINE

## 2023-08-23 PROCEDURE — 6360000004 HC RX CONTRAST MEDICATION: Performed by: EMERGENCY MEDICINE

## 2023-08-23 PROCEDURE — 80048 BASIC METABOLIC PNL TOTAL CA: CPT

## 2023-08-23 PROCEDURE — 6360000002 HC RX W HCPCS: Performed by: EMERGENCY MEDICINE

## 2023-08-23 PROCEDURE — 36415 COLL VENOUS BLD VENIPUNCTURE: CPT

## 2023-08-23 PROCEDURE — 2580000003 HC RX 258: Performed by: NURSE PRACTITIONER

## 2023-08-23 PROCEDURE — 96375 TX/PRO/DX INJ NEW DRUG ADDON: CPT

## 2023-08-23 PROCEDURE — APPSS60 APP SPLIT SHARED TIME 46-60 MINUTES: Performed by: NURSE PRACTITIONER

## 2023-08-23 PROCEDURE — 80053 COMPREHEN METABOLIC PANEL: CPT

## 2023-08-23 PROCEDURE — 83690 ASSAY OF LIPASE: CPT

## 2023-08-23 PROCEDURE — 84484 ASSAY OF TROPONIN QUANT: CPT

## 2023-08-23 PROCEDURE — 84100 ASSAY OF PHOSPHORUS: CPT

## 2023-08-23 PROCEDURE — 71046 X-RAY EXAM CHEST 2 VIEWS: CPT

## 2023-08-23 PROCEDURE — 6360000002 HC RX W HCPCS: Performed by: NURSE PRACTITIONER

## 2023-08-23 PROCEDURE — 99222 1ST HOSP IP/OBS MODERATE 55: CPT | Performed by: STUDENT IN AN ORGANIZED HEALTH CARE EDUCATION/TRAINING PROGRAM

## 2023-08-23 PROCEDURE — 6370000000 HC RX 637 (ALT 250 FOR IP): Performed by: STUDENT IN AN ORGANIZED HEALTH CARE EDUCATION/TRAINING PROGRAM

## 2023-08-23 PROCEDURE — 84478 ASSAY OF TRIGLYCERIDES: CPT

## 2023-08-23 PROCEDURE — 71260 CT THORAX DX C+: CPT

## 2023-08-23 PROCEDURE — 99285 EMERGENCY DEPT VISIT HI MDM: CPT

## 2023-08-23 PROCEDURE — 2580000003 HC RX 258: Performed by: EMERGENCY MEDICINE

## 2023-08-23 PROCEDURE — 6360000002 HC RX W HCPCS

## 2023-08-23 PROCEDURE — 82330 ASSAY OF CALCIUM: CPT

## 2023-08-23 PROCEDURE — 1200000000 HC SEMI PRIVATE

## 2023-08-23 PROCEDURE — 94669 MECHANICAL CHEST WALL OSCILL: CPT

## 2023-08-23 PROCEDURE — 85025 COMPLETE CBC W/AUTO DIFF WBC: CPT

## 2023-08-23 PROCEDURE — 94640 AIRWAY INHALATION TREATMENT: CPT

## 2023-08-23 PROCEDURE — 85027 COMPLETE CBC AUTOMATED: CPT

## 2023-08-23 PROCEDURE — 83735 ASSAY OF MAGNESIUM: CPT

## 2023-08-23 RX ORDER — SODIUM CHLORIDE 0.9 % (FLUSH) 0.9 %
5-40 SYRINGE (ML) INJECTION PRN
Status: DISCONTINUED | OUTPATIENT
Start: 2023-08-23 | End: 2023-08-24 | Stop reason: HOSPADM

## 2023-08-23 RX ORDER — POLYETHYLENE GLYCOL 3350 17 G/17G
17 POWDER, FOR SOLUTION ORAL 2 TIMES DAILY
Status: DISCONTINUED | OUTPATIENT
Start: 2023-08-23 | End: 2023-08-24 | Stop reason: HOSPADM

## 2023-08-23 RX ORDER — MORPHINE SULFATE 4 MG/ML
4 INJECTION, SOLUTION INTRAMUSCULAR; INTRAVENOUS ONCE
Status: COMPLETED | OUTPATIENT
Start: 2023-08-23 | End: 2023-08-23

## 2023-08-23 RX ORDER — 0.9 % SODIUM CHLORIDE 0.9 %
1000 INTRAVENOUS SOLUTION INTRAVENOUS ONCE
Status: COMPLETED | OUTPATIENT
Start: 2023-08-23 | End: 2023-08-23

## 2023-08-23 RX ORDER — ONDANSETRON 4 MG/1
4 TABLET, ORALLY DISINTEGRATING ORAL EVERY 8 HOURS PRN
Status: DISCONTINUED | OUTPATIENT
Start: 2023-08-23 | End: 2023-08-24 | Stop reason: HOSPADM

## 2023-08-23 RX ORDER — ONDANSETRON 2 MG/ML
4 INJECTION INTRAMUSCULAR; INTRAVENOUS ONCE
Status: COMPLETED | OUTPATIENT
Start: 2023-08-23 | End: 2023-08-23

## 2023-08-23 RX ORDER — BISACODYL 10 MG
10 SUPPOSITORY, RECTAL RECTAL DAILY
Status: COMPLETED | OUTPATIENT
Start: 2023-08-23 | End: 2023-08-23

## 2023-08-23 RX ORDER — ONDANSETRON 2 MG/ML
4 INJECTION INTRAMUSCULAR; INTRAVENOUS EVERY 6 HOURS PRN
Status: DISCONTINUED | OUTPATIENT
Start: 2023-08-23 | End: 2023-08-24 | Stop reason: HOSPADM

## 2023-08-23 RX ORDER — SODIUM CHLORIDE, SODIUM LACTATE, POTASSIUM CHLORIDE, CALCIUM CHLORIDE 600; 310; 30; 20 MG/100ML; MG/100ML; MG/100ML; MG/100ML
INJECTION, SOLUTION INTRAVENOUS CONTINUOUS
Status: ACTIVE | OUTPATIENT
Start: 2023-08-23 | End: 2023-08-23

## 2023-08-23 RX ORDER — POTASSIUM CHLORIDE 7.45 MG/ML
10 INJECTION INTRAVENOUS PRN
Status: DISCONTINUED | OUTPATIENT
Start: 2023-08-23 | End: 2023-08-24 | Stop reason: HOSPADM

## 2023-08-23 RX ORDER — SODIUM CHLORIDE 9 MG/ML
INJECTION, SOLUTION INTRAVENOUS PRN
Status: DISCONTINUED | OUTPATIENT
Start: 2023-08-23 | End: 2023-08-24 | Stop reason: HOSPADM

## 2023-08-23 RX ORDER — MAGNESIUM SULFATE 1 G/100ML
1000 INJECTION INTRAVENOUS PRN
Status: DISCONTINUED | OUTPATIENT
Start: 2023-08-23 | End: 2023-08-24 | Stop reason: HOSPADM

## 2023-08-23 RX ORDER — OXYCODONE HYDROCHLORIDE 5 MG/1
10 TABLET ORAL EVERY 4 HOURS PRN
Status: DISCONTINUED | OUTPATIENT
Start: 2023-08-23 | End: 2023-08-24 | Stop reason: HOSPADM

## 2023-08-23 RX ORDER — SODIUM CHLORIDE, SODIUM LACTATE, POTASSIUM CHLORIDE, CALCIUM CHLORIDE 600; 310; 30; 20 MG/100ML; MG/100ML; MG/100ML; MG/100ML
INJECTION, SOLUTION INTRAVENOUS CONTINUOUS
Status: DISCONTINUED | OUTPATIENT
Start: 2023-08-23 | End: 2023-08-24 | Stop reason: HOSPADM

## 2023-08-23 RX ORDER — ENEMA 19; 7 G/133ML; G/133ML
1 ENEMA RECTAL
Status: ACTIVE | OUTPATIENT
Start: 2023-08-23 | End: 2023-08-24

## 2023-08-23 RX ORDER — SODIUM CHLORIDE 0.9 % (FLUSH) 0.9 %
5-40 SYRINGE (ML) INJECTION EVERY 12 HOURS SCHEDULED
Status: DISCONTINUED | OUTPATIENT
Start: 2023-08-23 | End: 2023-08-24 | Stop reason: HOSPADM

## 2023-08-23 RX ORDER — ENOXAPARIN SODIUM 100 MG/ML
40 INJECTION SUBCUTANEOUS DAILY
Status: DISCONTINUED | OUTPATIENT
Start: 2023-08-23 | End: 2023-08-24 | Stop reason: HOSPADM

## 2023-08-23 RX ORDER — MORPHINE SULFATE 2 MG/ML
2 INJECTION, SOLUTION INTRAMUSCULAR; INTRAVENOUS EVERY 4 HOURS PRN
Status: DISCONTINUED | OUTPATIENT
Start: 2023-08-23 | End: 2023-08-24 | Stop reason: HOSPADM

## 2023-08-23 RX ORDER — MORPHINE SULFATE 2 MG/ML
INJECTION, SOLUTION INTRAMUSCULAR; INTRAVENOUS
Status: COMPLETED
Start: 2023-08-23 | End: 2023-08-23

## 2023-08-23 RX ORDER — PANTOPRAZOLE SODIUM 40 MG/1
40 TABLET, DELAYED RELEASE ORAL
Status: DISCONTINUED | OUTPATIENT
Start: 2023-08-23 | End: 2023-08-24 | Stop reason: HOSPADM

## 2023-08-23 RX ORDER — BISACODYL 10 MG
10 SUPPOSITORY, RECTAL RECTAL DAILY PRN
Status: DISCONTINUED | OUTPATIENT
Start: 2023-08-23 | End: 2023-08-23

## 2023-08-23 RX ORDER — OXYCODONE HYDROCHLORIDE 5 MG/1
5 TABLET ORAL EVERY 4 HOURS PRN
Status: DISCONTINUED | OUTPATIENT
Start: 2023-08-23 | End: 2023-08-24 | Stop reason: HOSPADM

## 2023-08-23 RX ADMIN — SODIUM CHLORIDE, POTASSIUM CHLORIDE, SODIUM LACTATE AND CALCIUM CHLORIDE: 600; 310; 30; 20 INJECTION, SOLUTION INTRAVENOUS at 05:12

## 2023-08-23 RX ADMIN — POLYETHYLENE GLYCOL 3350 17 G: 17 POWDER, FOR SOLUTION ORAL at 10:59

## 2023-08-23 RX ADMIN — MORPHINE SULFATE 2 MG: 2 INJECTION, SOLUTION INTRAMUSCULAR; INTRAVENOUS at 15:26

## 2023-08-23 RX ADMIN — PANTOPRAZOLE SODIUM 40 MG: 40 TABLET, DELAYED RELEASE ORAL at 15:25

## 2023-08-23 RX ADMIN — Medication 30 ML: at 10:59

## 2023-08-23 RX ADMIN — Medication 30 ML: at 21:00

## 2023-08-23 RX ADMIN — OXYCODONE HYDROCHLORIDE 10 MG: 5 TABLET ORAL at 21:00

## 2023-08-23 RX ADMIN — SODIUM CHLORIDE, POTASSIUM CHLORIDE, SODIUM LACTATE AND CALCIUM CHLORIDE: 600; 310; 30; 20 INJECTION, SOLUTION INTRAVENOUS at 21:49

## 2023-08-23 RX ADMIN — IOPAMIDOL 75 ML: 755 INJECTION, SOLUTION INTRAVENOUS at 02:25

## 2023-08-23 RX ADMIN — SODIUM CHLORIDE, PRESERVATIVE FREE 10 ML: 5 INJECTION INTRAVENOUS at 21:06

## 2023-08-23 RX ADMIN — ENOXAPARIN SODIUM 40 MG: 100 INJECTION SUBCUTANEOUS at 10:58

## 2023-08-23 RX ADMIN — OXYCODONE HYDROCHLORIDE 10 MG: 5 TABLET ORAL at 13:10

## 2023-08-23 RX ADMIN — HYDROMORPHONE HYDROCHLORIDE 0.5 MG: 1 INJECTION, SOLUTION INTRAMUSCULAR; INTRAVENOUS; SUBCUTANEOUS at 10:54

## 2023-08-23 RX ADMIN — MORPHINE SULFATE 4 MG: 4 INJECTION INTRAVENOUS at 01:48

## 2023-08-23 RX ADMIN — SODIUM CHLORIDE, POTASSIUM CHLORIDE, SODIUM LACTATE AND CALCIUM CHLORIDE: 600; 310; 30; 20 INJECTION, SOLUTION INTRAVENOUS at 15:28

## 2023-08-23 RX ADMIN — POLYETHYLENE GLYCOL 3350 17 G: 17 POWDER, FOR SOLUTION ORAL at 21:00

## 2023-08-23 RX ADMIN — ONDANSETRON 4 MG: 2 INJECTION INTRAMUSCULAR; INTRAVENOUS at 01:47

## 2023-08-23 RX ADMIN — MORPHINE SULFATE 4 MG: 4 INJECTION INTRAVENOUS at 03:51

## 2023-08-23 RX ADMIN — SODIUM CHLORIDE 1000 ML: 9 INJECTION, SOLUTION INTRAVENOUS at 03:33

## 2023-08-23 RX ADMIN — BISACODYL 10 MG: 10 SUPPOSITORY RECTAL at 10:58

## 2023-08-23 ASSESSMENT — ENCOUNTER SYMPTOMS
BLOOD IN STOOL: 0
NAUSEA: 0
ABDOMINAL PAIN: 1
CONSTIPATION: 1
BACK PAIN: 0
FACIAL SWELLING: 0
COUGH: 0
SHORTNESS OF BREATH: 0
VOMITING: 0
NAUSEA: 1
ABDOMINAL DISTENTION: 1

## 2023-08-23 ASSESSMENT — PAIN DESCRIPTION - LOCATION: LOCATION: ABDOMEN

## 2023-08-23 ASSESSMENT — PAIN SCALES - GENERAL
PAINLEVEL_OUTOF10: 9
PAINLEVEL_OUTOF10: 7
PAINLEVEL_OUTOF10: 10
PAINLEVEL_OUTOF10: 10

## 2023-08-23 NOTE — CARE COORDINATION
Case Management Assessment  Initial Evaluation    Date/Time of Evaluation: 8/23/2023 3:12 PM  Assessment Completed by: Davis Burk RN    If patient is discharged prior to next notation, then this note serves as note for discharge by case management. Patient Name: Sandi Downs                   YOB: 1965  Diagnosis: Epigastric pain [R10.13]  Acute pancreatitis, unspecified complication status, unspecified pancreatitis type [K85.90]                   Date / Time: 8/23/2023  1:29 AM    Patient Admission Status: Inpatient   Readmission Risk (Low < 19, Mod (19-27), High > 27): Readmission Risk Score: 25.2    Current PCP: No primary care provider on file. PCP verified by CM? Chart Reviewed: Yes      History Provided by: Patient  Patient Orientation: Alert and Oriented    Patient Cognition: Alert    Hospitalization in the last 30 days (Readmission):  Yes    If yes, Readmission Assessment in CM Navigator will be completed. Advance Directives:      Code Status: Full Code   Patient's Primary Decision Maker is: Legal Next of Kin (has daughter that he has not seen in 27 years. does not know her number)      Discharge Planning:    Patient lives with: Other (Comment) (lives with other homelss in shelter) Type of Home: Shelter  Primary Care Giver: Self  Patient Support Systems include: None   Current Financial resources: Medicaid, Medicare (medicaid QMB)  Current community resources:    Current services prior to admission: None            Current DME:              Type of Home Care services:  None    ADLS  Prior functional level:    Current functional level: Independent in ADLs/IADLs    PT AM-PAC:   /24  OT AM-PAC:   /24    Family can provide assistance at DC: No  Would you like Case Management to discuss the discharge plan with any other family members/significant others, and if so, who?  No  Plans to Return to Present Housing: Yes  Other Identified Issues/Barriers to RETURNING to current housing:

## 2023-08-23 NOTE — PROGRESS NOTES
Physical Therapy          Physical Therapy Cancel Note      DATE: 2023    NAME: Srikanth Gamboa  MRN: [id number]   : 1965      Patient not seen this date for Physical Therapy due to:    Patient at baseline functional level with no acute PT needs. Will defer PT evaluation at this time. Please reorder PT if future needs arise.        Electronically signed by ASHLI Nelson on 2023 at 1:23 PM

## 2023-08-23 NOTE — ED PROVIDER NOTES
[address] [place] [address] ED  Emergency Department Encounter  Emergency Medicine Resident     Pt Letitia Galvin  MRN: [id number]  [facility] 1965  Date of evaluation: 8/23/23  PCP:  No primary care provider on file. Note Started: 1:30 AM EDT      CHIEF COMPLAINT       Chief Complaint   Patient presents with    Abdominal Pain    Chest Pain       HISTORY OF PRESENT ILLNESS  (Location/Symptom, Timing/Onset, Context/Setting, Quality, Duration, Modifying Factors, Severity.)      Viviane Blanchard is a 66 y.o. male who presents with abdominal pain. Patient has a recently diagnosed history of pancreatic cancer. Patient was admitted at that time and also found to have a pulmonary embolism. Patient currently residing at a shelter and had sudden onset of abdominal pain today. Patient also reports he is having chest pain. States the chest pain began when he was waiting for a cab. Patient denies any significant cardiac history. Denies any cough, hemoptysis, shortness of breath. Patient describes the chest pain as a sharp left-sided chest pain that radiates towards his sternum. Patient also ports he was feeling nauseous all day yesterday. Does not feel nauseous at this time. Patient states when he was admitted, he did have a surgical procedure performed at that time. States he has not had issues postoperatively until today. Patient was found to have a pulmonary embolism on last admission and is supposed be taking Eliquis. States he has not taken his Eliquis in about 4 days as he cannot afford it. PAST MEDICAL / SURGICAL / SOCIAL / FAMILY HISTORY      has a past medical history of Abdominal pain, COVID-19 vaccine series completed, Duodenitis, Elevated CEA, Fatty liver, GERD (gastroesophageal reflux disease), History of recent hospitalization, Retroperitoneal mass, and Weight loss. has a past surgical history that includes Hand surgery; Urethra surgery;  Upper gastrointestinal endoscopy (N/A,

## 2023-08-23 NOTE — H&P
[facility]  Office: [address], DO, Devyn Curet, DO, Santhosh Pozos, DO, Rufina [place] Blood, DO, Ernesto Cee MD, Martinez Kirk MD, Aissatou Monteiro MD, Kenyon Mishra MD,  Ez Briscoe MD, Jerri Marsh MD, Mere Ann, DO, Kenroy Ford MD,  Sloan Borrego MD, Harleen Phillips MD, Stephie Lopez DO, Denis Flores MD,  Lisbet Abad DO, Brian Gallegos MD, Ankush Simpson MD, Michelle Duarte MD, Lilian Card MD,  Lennie Rivera MD, Colten Mccord MD, Marion Ware MD, Zaida Cody, DO, Philis Halsted, MD,  Shanelle Mack MD, Kina Deal, Sachi Smiley, CNP, Sharon Stauffer, CNP, Sd Staples, CNP,  Cheryle Balding, DNP, Valdemar Peabody, CNP, Dylon Mtz, CNP, Lidia Almonte, CNP, Adán Red, CNP, Quyen Hernández, CNP, Bipin Cornelius, PAMadelaineC, Marie Brothers, CNS, Fritz Arenas, CNP, Yoli Silver, [facility]    HISTORY AND PHYSICAL EXAMINATION            Date:   8/23/2023  Patient name:  Moshe Taylor  Date of admission:  8/23/2023  1:29 AM  MRN:   [id number]  Account:  [de-identified]  YOB: 1965  PCP:    No primary care provider on file. Room:   [address] [place] [address]  Code Status:    Full Code    Chief Complaint:     Chief Complaint   Patient presents with    Abdominal Pain    Chest Pain       History Obtained From:     patient, electronic medical record    History of Present Illness:     Moshe Taylor is a 66 y.o. Non- / non  male who presents with Abdominal Pain and Chest Pain   and is admitted to the hospital for the management of Cancer-related breakthrough pain. 72-year-old male past medical history of metastatic adenocarcinoma with pancreatic biliary origin, GERD, history of hemicolectomy with anastomosis, abdominal pain, chronic pancreatitis presents with abdominal pain.   Patient found to be constipated as well as having issues with opioid [facility]. Consultations:   IP CONSULT TO HOSPITALIST  IP CONSULT TO GENERAL SURGERY     Patient is admitted as inpatient status because of co-morbidities listed above, severity of signs and symptoms as outlined, requirement for current medical therapies and most importantly because of direct risk to patient if care not provided in a hospital setting. Expected length of stay > 48 hours.     Gricelda Storm MD  8/23/2023  7:01 PM

## 2023-08-23 NOTE — PROGRESS NOTES
[facility]  Occupational Therapy Not Seen Note    DATE: 2023    NAME: Hillary Zapata  MRN: [id number]   : 1965      Patient not seen this date for Occupational Therapy due to:    Patient independent with ADLs and functional tasks with no acute OT needs. Will defer OT evaluation at this time. Please reorder OT if future needs arise.      Electronically signed by NILA Head on 2023 at 3:03 PM

## 2023-08-23 NOTE — TELEPHONE ENCOUNTER
received call from  stating patient currently at hospital. Patient has upcoming appointments (8/25 and 8/28) and out of transportation through [facility]. Patient also staying at [facility] not at his listed address as he previously reported. Patient has no phone currently. Patient not eligible for transportation through [facility] and patient unable to be contacted by transportation provider if available.  states patient's daughter in town but unsure of availability or willingness to assist with patient's needs.

## 2023-08-23 NOTE — CONSULTS
[address]  Surgical Oncology Consultation Note    Hospitalization Day:0      REASON FOR SURGICAL ONCOLOGY CONSULTATION:  \"abdominal pain post op gastrjejunostomy\" per consult order    HISTORY OF PRESENT ILLNESS:  The patient is a 53-year-old male with history of pulmonary emphysema, PE s/p IVC filter 8/8/2023/on therapeutic AC, metastatic pancreatic cancer and gastric outlet obstruction, s/p diagnostic laparoscopy, robot assisted gastrojejunostomy, liver biopsy x 2 showing metastatic adenoCA (8/9/2023) presenting with abdominal pain and isolated nausea without emesis. Last BM yesterday x 2. Patient denies melena, hematochezia, diarrhea. Daily cigar use. Laboratory data notable for elevated lipase 489. CT chest shows no evidence of PE and notable for pulmonary emphysema. CTAP shows known pancreatic cancer with resolution in gastric distention and evidence of moderate to large amount of colonic stool. Patient is residing at [facility]. Surgical oncology is consulted for above reason.      PAST MEDICAL HISTORY:        Diagnosis Date    Abdominal pain 06/19/2023    COVID-19 vaccine series completed     Duodenitis 06/19/2023    Elevated CEA 06/19/2023    Fatty liver     GERD (gastroesophageal reflux disease) 05/05/2016    History of recent hospitalization 06/19/2023    til 6/20/2023    Retroperitoneal mass 06/19/2023    Weight loss 06/19/2023       PAST SURGICAL HISTORY:        Procedure Laterality Date    ERCP N/A 07/24/2023    ERCP STENT INSERTION performed by Medardo Turpin MD at [facility]  08/09/2023    XI ROBOTIC LAPAROSCOPIC GASTROJEJUNOSTOMY ANASTOMOSIS, SURGICAL EGD, LIVER BIOPSY  (PRE-OP TAP BLOCK)    IR IVC FILTER PLACEMENT W IMAGING  08/08/2023    IR IVC FILTER PLACEMENT W IMAGING 8/8/2023 New Whitaker MD [facility] PROCED[facility]    STOMACH SURGERY N/A 8/9/2023    XI ROBOTIC LAPAROSCOPIC gastrojejunostomy 8/9/2023   Elevated lipase 489 (275)  History of PE s/p IVC filter 8/8/2023 with recent CT chest showing no evidence of embolism  - maintenance and PRN bowel regimen, strict I/Os to assess pt's response; serial XR tomorrow AM  - encourage ambulation, being OOB, PT/OT, activity, Q2 hour turn schedule while in bed for bowel motility   - trial clear lq diet with clear ONS  - LR IVF resuscitation    - PRN analgesia, limit opioids if possible  - PRN anti-emetics, supportive care  - appreciate oncology recommendations and close nurse navigator f/u    - smoking cessation  - IS, acapella, coughing/deep breathing, RT  - CM/SW    Patient seen and examined. Patient's past medical and surgical history, vital signs, laboratory data, intake/output and radiologic findings reviewed. Plan of care discussed with Dr. Noel Rizzo and primary attending, Dr. Lia Samayoa. Thank you for allowing us to participate in this patient's plan of care. KATYA Gallagher  Surgical Oncology/ [facility]  Available through [facility]    Estimated time of 60 mins reviewing chart, labs, imaging, assessing patient, diagnosis and developing a plan of care. I have reviewed the above note and I have reviewed patient's chart. I have discussed the findings, established the care plan and recommendations with Resident.     Electronically signed by Kimani Devi IV, DO on 8/25/23 at 11:11 AM EDT

## 2023-08-23 NOTE — CARE COORDINATION
08/23/23 1516   Readmission Assessment   Number of Days since last admission? 1-7 days   Previous Disposition Other (comment)  (home to shelter)   Who is being Interviewed Patient   What was the patient's/caregiver's perception as to why they think they needed to return back to the hospital? Other (Comment)   Did you visit your Primary Care Physician after you left the hospital, before you returned this time? No   Why weren't you able to visit your PCP? Other (Comment)  (has no PCP has a Hem/oncology physician)   Did you see a specialist, such as Cardiac, Pulmonary, Orthopedic Physician, etc. after you left the hospital? No   Who advised the patient to return to the hospital? Self-referral   Does the patient report anything that got in the way of taking their medications? No   In our efforts to provide the best possible care to you and others like you, can you think of anything that we could have done to help you after you left the hospital the first time, so that you might not have needed to return so soon?  Additional Community resources available for illness support

## 2023-08-23 NOTE — CARE COORDINATION
Spoke to patient at bedside regarding transition plan. HE tells me that he has an appointment on 8/25 and 8/28 and has no way to get there. He also states that the the appointment on the 28 th he will be getting his port, but they will not put it in unless he has someone to be there with him during the procedure and take him home. Will discuss with SW and Cancer navigator. Left voicemail for Amairani at the [facility] regarding transportation and getting port placement. 1537 received a call from Amairani at [facility]. She tells to call [facility] with the [facility] to discuss.   Left voicemail

## 2023-08-23 NOTE — ED NOTES
Pt. Arrives to ED via private auto for c/o abdominal pain. Pt presents with abdominal pain. Patient has a recently diagnosed history of pancreatic cancer. Patient was admitted at that time and also found to have a pulmonary embolism. Patient currently residing at a shelter and had sudden onset of abdominal pain today. Patient also reports he is having chest pain. States the chest pain began when he was waiting for a cab. Patient denies any significant cardiac history. Denies any cough, hemoptysis, shortness of breath. Patient describes the chest pain as a sharp left-sided chest pain that radiates towards his sternum. Patient also ports he was feeling nauseous all day yesterday. Does not feel nauseous at this time. Patient states when he was admitted, he did have a surgical procedure performed at that time. States he has not had issues postoperatively until today. Patient was found to have a pulmonary embolism on last admission and is supposed be taking Eliquis. States he has not taken his Eliquis in about 4 days as he cannot afford it.        Reema Moore RN  08/23/23 9329

## 2023-08-23 NOTE — TELEPHONE ENCOUNTER
Name: Mary Guardado  : 1965  MRN: [id number]    Oncology Navigation Follow-Up Note    Contact Type:  Telephone    Notes: Barbie Rai care coordinator, called stating pt currently admitted. Per Briana Olivares pt stated now resides @ [facility], transportation via [employer] no longer available r/t used allotted number of transports, & no adult  for  port placement. Minerva Carolyn may contact Claude Gauze, [facility] , to discuss transportation assistance & contact # given. Upon review of chart noted new contact w/daughter's name & number. Inquired on new contact & if able to accompany pt. Briana Olivares stated will f/u. Will continue to follow.      Electronically signed by Leela Mahan RN on 2023 at 3:38 PM

## 2023-08-23 NOTE — ED NOTES
The following labs were labeled with appropriate pt sticker and tubed to lab:     [] Blue     [] Lavender   [] on ice  [x] Green/yellow  [] Green/black [] on ice  [] Francena Achilles  [] on ice  [] Yellow  [] Red  [] Type/ Screen  [] ABG  [] VBG    [] COVID-19 swab    [] Rapid  [] PCR  [] Flu swab  [] Peds Viral Panel     [] Urine Sample  [] Fecal Sample  [] Pelvic Cultures  [] Blood Cultures  [] X 2  [] STREP Cultures      Juan Taylor RN  08/23/23 7439

## 2023-08-24 ENCOUNTER — APPOINTMENT (OUTPATIENT)
Dept: GENERAL RADIOLOGY | Age: 58
End: 2023-08-24
Payer: MEDICARE

## 2023-08-24 ENCOUNTER — TELEPHONE (OUTPATIENT)
Dept: ONCOLOGY | Age: 58
End: 2023-08-24

## 2023-08-24 VITALS
TEMPERATURE: 98.5 F | HEART RATE: 67 BPM | OXYGEN SATURATION: 99 % | RESPIRATION RATE: 16 BRPM | BODY MASS INDEX: 22.37 KG/M2 | DIASTOLIC BLOOD PRESSURE: 75 MMHG | HEIGHT: 69 IN | SYSTOLIC BLOOD PRESSURE: 106 MMHG | WEIGHT: 151.01 LBS

## 2023-08-24 LAB
ANION GAP SERPL CALCULATED.3IONS-SCNC: 5 MMOL/L (ref 9–17)
BUN SERPL-MCNC: 7 MG/DL (ref 6–20)
CA-I BLD-SCNC: 1.21 MMOL/L (ref 1.13–1.33)
CALCIUM SERPL-MCNC: 8.8 MG/DL (ref 8.6–10.4)
CHLORIDE SERPL-SCNC: 107 MMOL/L (ref 98–107)
CO2 SERPL-SCNC: 24 MMOL/L (ref 20–31)
CREAT SERPL-MCNC: 0.6 MG/DL (ref 0.7–1.2)
EKG ATRIAL RATE: 83 BPM
EKG P AXIS: 84 DEGREES
EKG P-R INTERVAL: 150 MS
EKG Q-T INTERVAL: 378 MS
EKG QRS DURATION: 82 MS
EKG QTC CALCULATION (BAZETT): 444 MS
EKG R AXIS: 86 DEGREES
EKG T AXIS: 49 DEGREES
EKG VENTRICULAR RATE: 83 BPM
ERYTHROCYTE [DISTWIDTH] IN BLOOD BY AUTOMATED COUNT: 14 % (ref 11.8–14.4)
GFR SERPL CREATININE-BSD FRML MDRD: >60 ML/MIN/1.73M2
GLUCOSE SERPL-MCNC: 85 MG/DL (ref 70–99)
HCT VFR BLD AUTO: 36.2 % (ref 40.7–50.3)
HGB BLD-MCNC: 10.9 G/DL (ref 13–17)
LIPASE SERPL-CCNC: 207 U/L (ref 13–60)
MAGNESIUM SERPL-MCNC: 2.3 MG/DL (ref 1.6–2.6)
MCH RBC QN AUTO: 27.9 PG (ref 25.2–33.5)
MCHC RBC AUTO-ENTMCNC: 30.1 G/DL (ref 28.4–34.8)
MCV RBC AUTO: 92.8 FL (ref 82.6–102.9)
NRBC BLD-RTO: 0 PER 100 WBC
PHOSPHATE SERPL-MCNC: 3.3 MG/DL (ref 2.5–4.5)
PLATELET # BLD AUTO: 392 K/UL (ref 138–453)
PMV BLD AUTO: 8.8 FL (ref 8.1–13.5)
POTASSIUM SERPL-SCNC: 4.2 MMOL/L (ref 3.7–5.3)
RBC # BLD AUTO: 3.9 M/UL (ref 4.21–5.77)
SODIUM SERPL-SCNC: 136 MMOL/L (ref 135–144)
WBC OTHER # BLD: 8.9 K/UL (ref 3.5–11.3)

## 2023-08-24 PROCEDURE — 6360000002 HC RX W HCPCS: Performed by: NURSE PRACTITIONER

## 2023-08-24 PROCEDURE — 84100 ASSAY OF PHOSPHORUS: CPT

## 2023-08-24 PROCEDURE — 83690 ASSAY OF LIPASE: CPT

## 2023-08-24 PROCEDURE — 82330 ASSAY OF CALCIUM: CPT

## 2023-08-24 PROCEDURE — 6370000000 HC RX 637 (ALT 250 FOR IP): Performed by: STUDENT IN AN ORGANIZED HEALTH CARE EDUCATION/TRAINING PROGRAM

## 2023-08-24 PROCEDURE — 74018 RADEX ABDOMEN 1 VIEW: CPT

## 2023-08-24 PROCEDURE — 6360000002 HC RX W HCPCS: Performed by: STUDENT IN AN ORGANIZED HEALTH CARE EDUCATION/TRAINING PROGRAM

## 2023-08-24 PROCEDURE — 2580000003 HC RX 258: Performed by: NURSE PRACTITIONER

## 2023-08-24 PROCEDURE — 85027 COMPLETE CBC AUTOMATED: CPT

## 2023-08-24 PROCEDURE — 83735 ASSAY OF MAGNESIUM: CPT

## 2023-08-24 PROCEDURE — 36415 COLL VENOUS BLD VENIPUNCTURE: CPT

## 2023-08-24 PROCEDURE — 80048 BASIC METABOLIC PNL TOTAL CA: CPT

## 2023-08-24 PROCEDURE — 93010 ELECTROCARDIOGRAM REPORT: CPT | Performed by: INTERNAL MEDICINE

## 2023-08-24 PROCEDURE — 99232 SBSQ HOSP IP/OBS MODERATE 35: CPT | Performed by: STUDENT IN AN ORGANIZED HEALTH CARE EDUCATION/TRAINING PROGRAM

## 2023-08-24 PROCEDURE — APPSS45 APP SPLIT SHARED TIME 31-45 MINUTES: Performed by: NURSE PRACTITIONER

## 2023-08-24 PROCEDURE — 6370000000 HC RX 637 (ALT 250 FOR IP): Performed by: NURSE PRACTITIONER

## 2023-08-24 RX ORDER — POLYETHYLENE GLYCOL 3350 17 G/17G
17 POWDER, FOR SOLUTION ORAL DAILY PRN
Qty: 510 G | Refills: 0 | Status: SHIPPED | OUTPATIENT
Start: 2023-08-24 | End: 2023-08-28

## 2023-08-24 RX ORDER — OXYCODONE HYDROCHLORIDE AND ACETAMINOPHEN 5; 325 MG/1; MG/1
1 TABLET ORAL EVERY 8 HOURS PRN
Qty: 21 TABLET | Refills: 0 | Status: SHIPPED | OUTPATIENT
Start: 2023-08-24 | End: 2023-08-31

## 2023-08-24 RX ADMIN — NALOXEGOL OXALATE 25 MG: 12.5 TABLET, FILM COATED ORAL at 06:24

## 2023-08-24 RX ADMIN — OXYCODONE HYDROCHLORIDE 10 MG: 5 TABLET ORAL at 05:14

## 2023-08-24 RX ADMIN — POLYETHYLENE GLYCOL 3350 17 G: 17 POWDER, FOR SOLUTION ORAL at 07:40

## 2023-08-24 RX ADMIN — SODIUM CHLORIDE, PRESERVATIVE FREE 10 ML: 5 INJECTION INTRAVENOUS at 07:42

## 2023-08-24 RX ADMIN — ENOXAPARIN SODIUM 40 MG: 100 INJECTION SUBCUTANEOUS at 07:39

## 2023-08-24 RX ADMIN — PANTOPRAZOLE SODIUM 40 MG: 40 TABLET, DELAYED RELEASE ORAL at 15:15

## 2023-08-24 RX ADMIN — OXYCODONE HYDROCHLORIDE 10 MG: 5 TABLET ORAL at 00:34

## 2023-08-24 RX ADMIN — PANTOPRAZOLE SODIUM 40 MG: 40 TABLET, DELAYED RELEASE ORAL at 05:15

## 2023-08-24 RX ADMIN — MORPHINE SULFATE 2 MG: 2 INJECTION, SOLUTION INTRAMUSCULAR; INTRAVENOUS at 07:38

## 2023-08-24 RX ADMIN — MORPHINE SULFATE 2 MG: 2 INJECTION, SOLUTION INTRAMUSCULAR; INTRAVENOUS at 12:38

## 2023-08-24 RX ADMIN — OXYCODONE HYDROCHLORIDE 10 MG: 5 TABLET ORAL at 15:15

## 2023-08-24 RX ADMIN — MORPHINE SULFATE 2 MG: 2 INJECTION, SOLUTION INTRAMUSCULAR; INTRAVENOUS at 03:21

## 2023-08-24 RX ADMIN — OXYCODONE HYDROCHLORIDE 10 MG: 5 TABLET ORAL at 09:19

## 2023-08-24 RX ADMIN — Medication 30 ML: at 07:40

## 2023-08-24 RX ADMIN — SODIUM CHLORIDE, POTASSIUM CHLORIDE, SODIUM LACTATE AND CALCIUM CHLORIDE: 600; 310; 30; 20 INJECTION, SOLUTION INTRAVENOUS at 05:21

## 2023-08-24 ASSESSMENT — ENCOUNTER SYMPTOMS
FACIAL SWELLING: 0
NAUSEA: 0
VOMITING: 0
ABDOMINAL PAIN: 0
COUGH: 0
ABDOMINAL DISTENTION: 0
SHORTNESS OF BREATH: 0
BACK PAIN: 0
BLOOD IN STOOL: 0
CONSTIPATION: 0

## 2023-08-24 ASSESSMENT — PAIN SCALES - GENERAL
PAINLEVEL_OUTOF10: 10
PAINLEVEL_OUTOF10: 9

## 2023-08-24 ASSESSMENT — PAIN DESCRIPTION - LOCATION
LOCATION: ABDOMEN

## 2023-08-24 NOTE — CARE COORDINATION
Transitional Planning  Spoke with patient, plan to return to  [employer]. May need transportation. Dtr  plans on coming into town, but patient does not know if she will come to the hospital or meet him at [facility].  Discussed transportation barriers, patient states that has been resolved. A staff member at the [address] [place] [address] will provide transportation for him. 215 pm Call from [facility], stating patient has pain medication that he can not afford the $8.00. Pain medication is to control pain from pancreatic cancer with mets to liver. Patient is having cancer related pain. Medication assistance voucher faxed to OP pharmacy. Corinne Caicedo will set up cab to get patient back to the [facility]. Corinne Caicedo will update RN.       Discharge [address] Case Management Department  Written by: Mina Milian RN    Patient Name: Evangelista Clifton  Attending Provider: No att. providers found  Admit Date: 2023  1:29 AM  MRN: [id number]  Account: [de-identified]                     : 1965  Discharge Date: 2023      Disposition: [facility], [place]

## 2023-08-24 NOTE — TELEPHONE ENCOUNTER
received update from [facility]  stating patient has transportation to upcoming appointments (8/25 and 8/28).  will continue to follow.

## 2023-08-24 NOTE — PROGRESS NOTES
[facility]  Office: 7900  1826, DO, Dallas Morales, DO, Vicki Younger, DO, Bernardo Christensen Blood, DO, Caleb Diez MD, Jose Jorge MD, Lindsay Amaya MD, Arcelia Libman, MD,  Patrick Rico MD, Paulino Weiss MD, Jatinder Mane, DO, Nicola Barker MD,  Pantera Portillo MD, Siobhan Lora MD, Oziel Hussein DO, Jody Lua MD,  Lilibeth Seo DO, Flako Rodríguez MD, Joanie Pimentel MD, Zenia Katz MD, Angela Wong MD,  Dilan Perkins MD, Alexys Carmona MD, Martina Conklin MD, Slime Ashton DO, Iwona Arevalo MD,  Carissa Cuevas MD, Sahil Quiñones, Rodríguez Mai, CNP, Foreign Aleman, CNP, La Sandhu, CNP,  Janeth Marmolejo, [facility], Lauren Hunter, CNP, Rosa Bangura, CNP, Jose Alfredo Avelar, CNP, Crow Vences, CNP, Eleno Doran, CNP, Blossom Hale PA-C, Mary Cole, CNS, Laila Kahn, CNP, Dea Fong, [address] [place] [address] Johny Devlin    Progress Note    8/24/2023    10:00 AM    Name:   Gurdeep Delgadillo  MRN:     [id number]     Acct:      [de-identified]   Room:   [address] [place] [address] Day:  1  Admit Date:  8/23/2023  1:29 AM    PCP:   No primary care provider on file. Code Status:  Full Code    Subjective:     C/C:   Chief Complaint   Patient presents with    Abdominal Pain    Chest Pain     Interval History Status: improved. Patient seen and examined. Abdominal pain improved. No other issues. Has appointment tomorrow     Brief History:         Review of Systems:     Review of Systems   Constitutional:  Positive for fatigue. Negative for activity change. HENT:  Negative for congestion, facial swelling, hearing loss and tinnitus. Respiratory:  Negative for cough and shortness of breath. Gastrointestinal:  Negative for abdominal distention, abdominal pain, blood in stool, constipation, nausea and vomiting. Endocrine: Negative for polydipsia and polyphagia.    Genitourinary:  Negative for Additional Contrast? None    Result Date: 8/23/2023  Evidence of stent in the common bile duct. Mass in the head of pancreas representing carcinoma of head of pancreas. Pneumobilia in the left hepatic lobe secondary to CBD stent. Previously demonstrated markedly dilated stomach as on 08/05/2023 appears to be resolved without any gastric distension or dilation at this time. Small bowel distension/dilation is decreased. Still there is small to moderate amount of fluid with dilation of the small bowel present in pelvis with multiple fluid levels anteriorly. Evidence of moderate to large amount of stool present throughout most of colon. Moderate to large amount of gas and stool in the rectosigmoid colon. The findings are consistent with constipation. There is no definable localized inflammatory process or abscess, or ascites or pneumoperitoneum. CT CHEST PULMONARY EMBOLISM W CONTRAST    Result Date: 8/23/2023  No evidence of pulmonary embolism. No evidence of thoracic aortic aneurysm or dissection. Moderate to marked centrilobular pulmonary emphysema in the lungs. Mild dependent atelectatic changes at the posterior lung bases bilaterally. No pleural effusion or pneumothorax. Physical Examination:        Physical Exam  Constitutional:       Comments: Chronically ill-appearing   HENT:      Right Ear: External ear normal.      Left Ear: External ear normal.      Nose: Nose normal.      Mouth/Throat:      Mouth: Mucous membranes are moist.   Eyes:      Pupils: Pupils are equal, round, and reactive to light. Cardiovascular:      Rate and Rhythm: Normal rate and regular rhythm. Heart sounds: No murmur heard. Pulmonary:      Effort: Pulmonary effort is normal.      Breath sounds: No wheezing or rales. Abdominal:      General: Bowel sounds are normal. There is no distension. Palpations: Abdomen is soft. Tenderness: There is no abdominal tenderness.    Musculoskeletal:      Cervical back: Neck

## 2023-08-24 NOTE — PLAN OF CARE
Problem: Discharge Planning  Goal: Discharge to home or other facility with appropriate resources  8/24/2023 0827 by Kaushik Drummond RN  Outcome: Progressing  8/24/2023 0617 by Myrtle Pace RN  Outcome: Progressing     Problem: Pain  Goal: Verbalizes/displays adequate comfort level or baseline comfort level  8/24/2023 0827 by Kaushik Drummond RN  Outcome: Progressing  8/24/2023 0617 by Myrtle Pace RN  Outcome: Progressing     Problem: ABCDS Injury Assessment  Goal: Absence of physical injury  8/24/2023 0827 by Kaushik Drummond RN  Outcome: Progressing  8/24/2023 0617 by Myrtle Pace RN  Outcome: Progressing

## 2023-08-24 NOTE — CARE COORDINATION
Consult received from SELENE/Christie to assist with his discharge needs, ie: transportation, housing. Pt has been staying at [facility]. Previously he was in a boarding house that was deplorable conditions. Spoke to Karla Ahn at [facility]. She stated their beds are really tight due to the weather today. She requested that pt stay tonight with a discharge tomorrow. Explained that pt is already discharged. Pt's daughter came into town yesterday. She has not had contact with her father in 32 years. She is planning to take him to [facility] for his CT scan's tomorrow. Pt has an appointment on Monday at [facility] to get his [facility]. A [facility] been [facility] from [facility] will meet him there and transport back. Called placed to [facility]cer center to advise of pt's need for transport to his chemo appointments going forward. Also spoke to Bev Behret/[facility] to inform of the above. She is also following pt closely. [facility] called back and has a cot for pt to stay on tonight. Will arrange a cab back to [facility] when pt is ready. Cab is scheduled for 5:30pm .

## 2023-08-24 NOTE — DISCHARGE SUMMARY
[facility]  Office: 7900  1826, DO, Dorcas Saunders, DO, Sandra Diez, DO, Colby Sarah Blood, DO, Igor Penaloza MD, Sharon Jacobs MD, Aleida Kelley MD, Molly Villalobos MD,  Inez Mann MD, Clemencia Jones MD, Jake Mcqueen DO, Marshall Yu MD,  Candance Barrios, DO, Jeanne Rivero MD, Dejah Franklin MD, Suman Bhakta, DO, Marta Oviedo MD,  Guadalupe Gayle DO, Kailee Shell MD, Rossy Gonzales MD, Shanthi Bustillos MD, Pilar Yadav MD,  Ailin Mattson MD, Jose Santos MD, Rudy Aleman MD, Josiah Nolan DO, Katey Martinez MD,  Solomon Nagel MD, Soo Pearl, CNP,  Kenzie Byrd, CNP, Christa Poon, CNP, Denice Cushing, CNP,  Kerrie Quiroz, [facility], Toya Hughes, CNP, Ermelinda Plaza, CNP, John Mcgarry, CNP, Chaitanya Monique, CNP, Dontae Mckeon, CNP, Cayden Fermin PA-C, Karel Cadet, CNS, Jennifer Alamo, CNP, Desirae Orr, [facility]    Discharge Summary     Patient ID: Patience Clay  :  1965   MRN: [id number]     ACCOUNT:  [de-identified]   Patient's PCP: No primary care provider on file. Admit Date: 2023   Discharge Date: 2023     Length of Stay: 1  Code Status:  Full Code  Admitting Physician: Guadalupe Gayle DO  Discharge Physician: Marshall Yu MD     Active Discharge Diagnoses:     Hospital Problem Lists:  Principal Problem:    Cancer-related breakthrough pain  Active Problems:    GERD (gastroesophageal reflux disease)    Smoking    Dilation of pancreatic duct    Pancreatic cancer metastasized to liver [facility])    Cancer of head of pancreas ([address])    Severe malnutrition ([address])    Has been smoking for 30 years    Acute pancreatitis, unspecified complication status, unspecified pancreatitis type    Drug-induced constipation    Elevated lipase    Epigastric pain  Resolved Problems:    * No resolved hospital problems.  *      Admission Condition:  stable     Discharged [address] [facility] [address]  [phone]    Follow up      Reza Steele MD  [address]  [facility] [address]  [address] Radiology  [facility] [address] [address]  [phone]           Requiring Further Evaluation/Follow Up POST HOSPITALIZATION/Incidental Findings: Follow up PCP follow up oncology    Diet: regular diet    Activity: As tolerated    Instructions to Patient: Follow up PCP follow up oncology, reduce pain medications as tolerating    Discharge Medications:      Medication List        START taking these medications      lipase-protease-amylase 52148-99941 units Cpep delayed release capsule  Commonly known as: ZENPEP  Take 1 capsule by mouth 3 times daily (with meals)     naloxegol 25 MG Tabs tablet  Commonly known as: MOVANTIK  Take 1 tablet by mouth every morning (before breakfast)  Start taking on: August 25, 2023     oxyCODONE-acetaminophen 5-325 MG per tablet  Commonly known as: Percocet  Take 1 tablet by mouth every 8 hours as needed for Pain for up to 7 days. Intended supply: 7 days.  Take lowest dose possible to manage pain Max Daily Amount: 3 tablets     polyethylene glycol 17 GM/SCOOP powder  Commonly known as: GLYCOLAX  Take 17 g by mouth daily as needed (constipation)            CONTINUE taking these medications      apixaban 5 MG Tabs tablet  Commonly known as: ELIQUIS  Take 1 tablet by mouth 2 times daily     folic acid 1 MG tablet  Commonly known as: FOLVITE  Take 1 tablet by mouth daily     metoclopramide 10 MG tablet  Commonly known as: REGLAN  Take 1 tablet by mouth 4 times daily (before meals and nightly)     pantoprazole 40 MG tablet  Commonly known as: PROTONIX  Take 1 tablet by mouth 2 times daily (before meals)     thiamine 100 MG tablet  Take 1 tablet by mouth daily               Where to Get Your Medications        These medications were sent to [address]

## 2023-08-24 NOTE — PROGRESS NOTES
CLINICAL PHARMACY NOTE: MEDS TO BEDS    Total # of Prescriptions Filled: 1   The following medications were delivered to the patient:  Percocet 5-325mg    Additional Documentation: delivered to patient in room 161 8/24 at 4:45pm. No co-pay.

## 2023-08-25 ENCOUNTER — HOSPITAL ENCOUNTER (OUTPATIENT)
Dept: NUCLEAR MEDICINE | Age: 58
End: 2023-08-25
Attending: INTERNAL MEDICINE
Payer: MEDICARE

## 2023-08-25 DIAGNOSIS — C25.0 MALIGNANT NEOPLASM OF HEAD OF PANCREAS (HCC): ICD-10-CM

## 2023-08-25 LAB — GLUCOSE BLD-MCNC: 96 MG/DL (ref 75–110)

## 2023-08-25 PROCEDURE — 3430000000 HC RX DIAGNOSTIC RADIOPHARMACEUTICAL: Performed by: INTERNAL MEDICINE

## 2023-08-25 PROCEDURE — 78815 PET IMAGE W/CT SKULL-THIGH: CPT

## 2023-08-25 PROCEDURE — 2580000003 HC RX 258: Performed by: INTERNAL MEDICINE

## 2023-08-25 PROCEDURE — 82947 ASSAY GLUCOSE BLOOD QUANT: CPT

## 2023-08-25 PROCEDURE — A9552 F18 FDG: HCPCS | Performed by: INTERNAL MEDICINE

## 2023-08-25 RX ORDER — SODIUM CHLORIDE 0.9 % (FLUSH) 0.9 %
10 SYRINGE (ML) INJECTION PRN
Status: DISCONTINUED | OUTPATIENT
Start: 2023-08-25 | End: 2023-08-28 | Stop reason: HOSPADM

## 2023-08-25 RX ORDER — FLUDEOXYGLUCOSE F 18 200 MCI/ML
10 INJECTION, SOLUTION INTRAVENOUS
Status: COMPLETED | OUTPATIENT
Start: 2023-08-25 | End: 2023-08-25

## 2023-08-25 RX ADMIN — FLUDEOXYGLUCOSE F 18 10.88 MILLICURIE: 200 INJECTION, SOLUTION INTRAVENOUS at 13:14

## 2023-08-25 RX ADMIN — SODIUM CHLORIDE, PRESERVATIVE FREE 10 ML: 5 INJECTION INTRAVENOUS at 13:14

## 2023-08-25 NOTE — CARE COORDINATION
[address] initial evaluation with nursing        8/23/2023      Taran Boykin 1965      Referred for program services of [facility] on 8/23/23 by high acuity coordinator at [facility]. Client was readmitted to hospital on evening of 8/24/23 and seen at bedside in hospital for initial evaluation. Discussed program services of [facility] and consent for services obtained. Living Situation    Shawna Arellano is a 66 y.o. male living shelter. The home is:  [facility] he had been living in [facility] housing at [facility] was non supportive and unsanitary so he left [facility] to stay at [facility] . He has limited supports   He has lived alone and re connected with his daughter this week. Reports he had not been in contact with daughter for over 20 years . Support includes: patient, daughter, and staff at [facility]. Social History:   Social History     Socioeconomic History    Marital status: Single     Spouse name: Not on file    Number of children: Adult children - 1 daughter, 3 sons     Years of education: HS + some college classes    Highest education level: HS   Occupational History    Disabled for approx 25 years   Tobacco Use    Smoking status: Some Days     Packs/day: 1.00     Years: 25.00     Pack years: 25.00     Types: Cigars, Cigarettes    Smokeless tobacco: Never    Tobacco comments:     Pt stated quit smoking cigarettes 7 years ago & currently smokes cigars @ times.       smoking cessation referral offered, pt declined @ this time   Substance and Sexual Activity    Alcohol use: Not Currently     Comment: Pt stated heavy drinker & quit 13 years ago    Drug use: Not Currently     Types: Marijuana Donte Morales     Comment: quit 20 years ago    Sexual activity: Never   Other Topics Concern    Not on file   Social History Narrative    Not on file     Social Determinants of Health     Financial Resource Strain:

## 2023-08-28 ENCOUNTER — HOSPITAL ENCOUNTER (OUTPATIENT)
Dept: INTERVENTIONAL RADIOLOGY/VASCULAR | Age: 58
Discharge: HOME | End: 2023-08-30
Payer: MEDICARE

## 2023-08-28 ENCOUNTER — TELEPHONE (OUTPATIENT)
Dept: SURGERY | Age: 58
End: 2023-08-28

## 2023-08-28 VITALS
TEMPERATURE: 96.8 F | DIASTOLIC BLOOD PRESSURE: 100 MMHG | SYSTOLIC BLOOD PRESSURE: 143 MMHG | HEART RATE: 75 BPM | BODY MASS INDEX: 21.06 KG/M2 | HEIGHT: 69 IN | RESPIRATION RATE: 17 BRPM | WEIGHT: 142.2 LBS | OXYGEN SATURATION: 97 %

## 2023-08-28 DIAGNOSIS — C25.9 MALIGNANT NEOPLASM OF PANCREAS, UNSPECIFIED LOCATION OF MALIGNANCY (HCC): ICD-10-CM

## 2023-08-28 PROCEDURE — 7100000010 HC PHASE II RECOVERY - FIRST 15 MIN

## 2023-08-28 PROCEDURE — 99152 MOD SED SAME PHYS/QHP 5/>YRS: CPT

## 2023-08-28 PROCEDURE — 77001 FLUOROGUIDE FOR VEIN DEVICE: CPT

## 2023-08-28 PROCEDURE — 2580000003 HC RX 258: Performed by: RADIOLOGY

## 2023-08-28 PROCEDURE — 99153 MOD SED SAME PHYS/QHP EA: CPT

## 2023-08-28 PROCEDURE — 76937 US GUIDE VASCULAR ACCESS: CPT

## 2023-08-28 PROCEDURE — 7100000011 HC PHASE II RECOVERY - ADDTL 15 MIN

## 2023-08-28 PROCEDURE — 36561 INSERT TUNNELED CV CATH: CPT

## 2023-08-28 PROCEDURE — C1788 PORT, INDWELLING, IMP: HCPCS

## 2023-08-28 PROCEDURE — 6360000002 HC RX W HCPCS: Performed by: RADIOLOGY

## 2023-08-28 RX ORDER — HEPARIN SODIUM 1000 [USP'U]/ML
INJECTION, SOLUTION INTRAVENOUS; SUBCUTANEOUS PRN
Status: COMPLETED | OUTPATIENT
Start: 2023-08-28 | End: 2023-08-28

## 2023-08-28 RX ORDER — FENTANYL CITRATE 50 UG/ML
INJECTION, SOLUTION INTRAMUSCULAR; INTRAVENOUS PRN
Status: COMPLETED | OUTPATIENT
Start: 2023-08-28 | End: 2023-08-28

## 2023-08-28 RX ORDER — ACETAMINOPHEN 325 MG/1
650 TABLET ORAL EVERY 4 HOURS PRN
Status: DISCONTINUED | OUTPATIENT
Start: 2023-08-28 | End: 2023-08-31 | Stop reason: HOSPADM

## 2023-08-28 RX ORDER — SODIUM CHLORIDE 0.9 % (FLUSH) 0.9 %
5-40 SYRINGE (ML) INJECTION EVERY 12 HOURS SCHEDULED
Status: DISCONTINUED | OUTPATIENT
Start: 2023-08-28 | End: 2023-08-31 | Stop reason: HOSPADM

## 2023-08-28 RX ORDER — SODIUM CHLORIDE 9 MG/ML
INJECTION, SOLUTION INTRAVENOUS PRN
Status: DISCONTINUED | OUTPATIENT
Start: 2023-08-28 | End: 2023-08-31 | Stop reason: HOSPADM

## 2023-08-28 RX ORDER — SODIUM CHLORIDE 9 MG/ML
INJECTION, SOLUTION INTRAVENOUS CONTINUOUS
Status: DISCONTINUED | OUTPATIENT
Start: 2023-08-28 | End: 2023-08-31 | Stop reason: HOSPADM

## 2023-08-28 RX ORDER — THIAMINE MONONITRATE (VIT B1) 100 MG
100 TABLET ORAL DAILY
COMMUNITY

## 2023-08-28 RX ORDER — SODIUM CHLORIDE 0.9 % (FLUSH) 0.9 %
5-40 SYRINGE (ML) INJECTION PRN
Status: DISCONTINUED | OUTPATIENT
Start: 2023-08-28 | End: 2023-08-31 | Stop reason: HOSPADM

## 2023-08-28 RX ADMIN — CEFAZOLIN 2000 MG: 10 INJECTION, POWDER, FOR SOLUTION INTRAVENOUS at 09:29

## 2023-08-28 RX ADMIN — HEPARIN SODIUM 500 UNITS: 1000 INJECTION INTRAVENOUS; SUBCUTANEOUS at 10:18

## 2023-08-28 RX ADMIN — FENTANYL CITRATE 50 MCG: 50 INJECTION INTRAMUSCULAR; INTRAVENOUS at 10:02

## 2023-08-28 RX ADMIN — SODIUM CHLORIDE: 9 INJECTION, SOLUTION INTRAVENOUS at 09:14

## 2023-08-28 ASSESSMENT — PAIN DESCRIPTION - DESCRIPTORS: DESCRIPTORS: ACHING;CRAMPING;DISCOMFORT

## 2023-08-28 ASSESSMENT — PAIN - FUNCTIONAL ASSESSMENT: PAIN_FUNCTIONAL_ASSESSMENT: 0-10

## 2023-08-29 ENCOUNTER — TELEPHONE (OUTPATIENT)
Dept: ONCOLOGY | Age: 58
End: 2023-08-29

## 2023-08-29 DIAGNOSIS — C25.0 MALIGNANT NEOPLASM OF HEAD OF PANCREAS (HCC): Primary | ICD-10-CM

## 2023-08-29 RX ORDER — OXYCODONE HYDROCHLORIDE AND ACETAMINOPHEN 5; 325 MG/1; MG/1
1 TABLET ORAL EVERY 8 HOURS PRN
Qty: 12 TABLET | Refills: 0 | Status: SHIPPED | OUTPATIENT
Start: 2023-08-29 | End: 2023-08-30 | Stop reason: RX

## 2023-08-29 NOTE — TELEPHONE ENCOUNTER
Spoke with patient, confirmed pharmacy for pain med refill. Informed patient that further pain medication RX need to be completed through pain management or oncology. Patient voiced understanding and appreciation.

## 2023-08-29 NOTE — TELEPHONE ENCOUNTER
Name: Best Emanuel  : 1965  MRN: [id number]    Oncology Navigation Follow-Up Note    Contact Type:  Telephone    Notes: Spoke w/pt (contacted on daughter's phone [phone]) updated on  [facility]/ chemotherapy appt details &  Dr. Roxy Concepcion f/u & pump removal appt details. Pt verbalized understanding & requested transportation assistance. Instructed pt writer will contact [facility] , & request contact pt. Pt verbalized understanding & denied questions/concerns. Encouraged to contact writer prn. Attempted to contact [facility], no answer, VM left updated on pt. Will continue to follow.     Electronically signed by Enrique Johnson RN on 2023 at 1:26 PM

## 2023-08-30 ENCOUNTER — TELEPHONE (OUTPATIENT)
Dept: GASTROENTEROLOGY | Age: 58
End: 2023-08-30

## 2023-08-30 ENCOUNTER — TELEPHONE (OUTPATIENT)
Dept: ONCOLOGY | Age: 58
End: 2023-08-30

## 2023-08-30 ENCOUNTER — TELEPHONE (OUTPATIENT)
Dept: SURGERY | Age: 58
End: 2023-08-30

## 2023-08-30 ENCOUNTER — CARE COORDINATION (OUTPATIENT)
Dept: CARE COORDINATION | Age: 58
End: 2023-08-30

## 2023-08-30 RX ORDER — OXYCODONE HYDROCHLORIDE AND ACETAMINOPHEN 5; 325 MG/1; MG/1
1 TABLET ORAL EVERY 8 HOURS PRN
Qty: 12 TABLET | Refills: 0 | Status: SHIPPED | OUTPATIENT
Start: 2023-08-30 | End: 2023-09-06

## 2023-08-30 NOTE — TELEPHONE ENCOUNTER
Writer spoke with patient confirming medication oxyCODONE-acetaminophen (PERCOCET) 5-325 MG per tablet which was sent into [address], [address] [facility].

## 2023-08-30 NOTE — TELEPHONE ENCOUNTER
[facility] does not carry schedule 2 narcs, patient asked for med to be sent to [facility]. V's.

## 2023-08-30 NOTE — TELEPHONE ENCOUNTER
[facility] called and informed writer that they do not fill schedule II narcotics. Writer called patient to inquire on another pharmacy, no answer, LVM.

## 2023-08-30 NOTE — TELEPHONE ENCOUNTER
Attempting to schedule transportation through [employer] [facility] [employer] for his two upcoming appts. Will update chart when rides are confirmed.     Yvette Dick MSW, LSW

## 2023-08-31 ENCOUNTER — HOSPITAL ENCOUNTER (OUTPATIENT)
Facility: MEDICAL CENTER | Age: 58
End: 2023-08-31
Payer: MEDICARE

## 2023-08-31 NOTE — CARE COORDINATION
Writer went to see client and speak to client about pain management and medications. Dr. Faisal Hendricks called in script to [facility] for client to receive pain medications. Writer verified script and was told by [facility] pharmacy that they didn't have medications available. Writer was able to call Dr. Patricia Ochoa' office back for script to be called into [facility]. Writer went to  medications for client at [address] and dropped off to client. Writer also discussed with client his appt for chemo on 9/5/23 and transportation. Melvin Wiggins RN, BSN  [employer]    Current Outpatient Medications   Medication Sig Dispense Refill    oxyCODONE-acetaminophen (PERCOCET) 5-325 MG per tablet Take 1 tablet by mouth every 8 hours as needed for Pain for up to 7 days. Intended supply: 3 days. Take lowest dose possible to manage pain Max Daily Amount: 3 tablets 12 tablet 0    vitamin B-1 (THIAMINE) 100 MG tablet Take 1 tablet by mouth daily      oxyCODONE-acetaminophen (PERCOCET) 5-325 MG per tablet Take 1 tablet by mouth every 8 hours as needed for Pain for up to 7 days. Intended supply: 7 days. Take lowest dose possible to manage pain Max Daily Amount: 3 tablets 21 tablet 0    apixaban (ELIQUIS) 5 MG TABS tablet Take 1 tablet by mouth 2 times daily 60 tablet 0    metoclopramide (REGLAN) 10 MG tablet Take 1 tablet by mouth 4 times daily (before meals and nightly) 120 tablet 0    pantoprazole (PROTONIX) 40 MG tablet Take 1 tablet by mouth 2 times daily (before meals) 60 tablet 0    folic acid (FOLVITE) 1 MG tablet Take 1 tablet by mouth daily 30 tablet 3     No current facility-administered medications for this visit. Goals        Care Coordination Self Management      CC Self Management Goal  Patient Goal (What steps will patient take to achieve goal?): take prescribed medications and become knowledgeable in self care and when to report symptoms, signs of increasing illness.    Patient is able

## 2023-09-01 ENCOUNTER — TELEPHONE (OUTPATIENT)
Dept: ONCOLOGY | Age: 58
End: 2023-09-01

## 2023-09-01 ENCOUNTER — TELEPHONE (OUTPATIENT)
Dept: INFUSION THERAPY | Facility: MEDICAL CENTER | Age: 58
End: 2023-09-01

## 2023-09-01 DIAGNOSIS — C25.9 PANCREATIC ADENOCARCINOMA (HCC): Primary | ICD-10-CM

## 2023-09-01 NOTE — TELEPHONE ENCOUNTER
RN check of new chemotherapy orders per Dr. Shawn Alamo.    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 Order noted and chart to  for processing; labs to epic; note to pool for 2nd RN check.

## 2023-09-01 NOTE — TELEPHONE ENCOUNTER
8-30-23 message from [facility] [phone]   Pt at a shelter and looking for temporary housing   Concerned that group home will e very temporary   Staff don't overlook on daily basis   Pain meds to locked box he can not get himself  Will rpoably need snkf placement soon   Daughter in [place]   Please call to touch base     9-1-23 called Mora- message karen that I am returning her call

## 2023-09-05 ENCOUNTER — TELEPHONE (OUTPATIENT)
Dept: ONCOLOGY | Age: 58
End: 2023-09-05

## 2023-09-05 ENCOUNTER — CARE COORDINATION (OUTPATIENT)
Dept: CARE COORDINATION | Age: 58
End: 2023-09-05

## 2023-09-05 ENCOUNTER — TELEPHONE (OUTPATIENT)
Dept: INFUSION THERAPY | Facility: MEDICAL CENTER | Age: 58
End: 2023-09-05

## 2023-09-05 ENCOUNTER — HOSPITAL ENCOUNTER (OUTPATIENT)
Dept: INFUSION THERAPY | Facility: MEDICAL CENTER | Age: 58
Discharge: HOME | End: 2023-09-05
Payer: MEDICARE

## 2023-09-05 VITALS
DIASTOLIC BLOOD PRESSURE: 79 MMHG | HEART RATE: 78 BPM | WEIGHT: 143.3 LBS | TEMPERATURE: 97.5 F | RESPIRATION RATE: 16 BRPM | BODY MASS INDEX: 21.16 KG/M2 | SYSTOLIC BLOOD PRESSURE: 123 MMHG

## 2023-09-05 DIAGNOSIS — C25.9 PANCREATIC ADENOCARCINOMA (HCC): Primary | ICD-10-CM

## 2023-09-05 DIAGNOSIS — C25.0 MALIGNANT NEOPLASM OF HEAD OF PANCREAS (HCC): Primary | ICD-10-CM

## 2023-09-05 DIAGNOSIS — C25.0 MALIGNANT NEOPLASM OF HEAD OF PANCREAS (HCC): ICD-10-CM

## 2023-09-05 LAB
ALBUMIN SERPL-MCNC: 3.8 G/DL (ref 3.5–5.2)
ALP SERPL-CCNC: 98 U/L (ref 40–129)
ALT SERPL-CCNC: 16 U/L (ref 5–41)
ANION GAP SERPL CALCULATED.3IONS-SCNC: 12 MMOL/L (ref 9–17)
AST SERPL-CCNC: 12 U/L
BASOPHILS # BLD: 0.05 K/UL (ref 0–0.2)
BASOPHILS NFR BLD: 1 % (ref 0–2)
BILIRUB SERPL-MCNC: 0.3 MG/DL (ref 0.3–1.2)
BUN SERPL-MCNC: 9 MG/DL (ref 6–20)
BUN/CREAT SERPL: 15 (ref 9–20)
CALCIUM SERPL-MCNC: 9.2 MG/DL (ref 8.6–10.4)
CHLORIDE SERPL-SCNC: 107 MMOL/L (ref 98–107)
CO2 SERPL-SCNC: 22 MMOL/L (ref 20–31)
CREAT SERPL-MCNC: 0.6 MG/DL (ref 0.7–1.2)
EOSINOPHIL # BLD: 0.25 K/UL (ref 0–0.44)
EOSINOPHILS RELATIVE PERCENT: 3 % (ref 1–4)
ERYTHROCYTE [DISTWIDTH] IN BLOOD BY AUTOMATED COUNT: 14.6 % (ref 11.8–14.4)
GFR SERPL CREATININE-BSD FRML MDRD: >60 ML/MIN/1.73M2
GLUCOSE SERPL-MCNC: 126 MG/DL (ref 70–99)
HCT VFR BLD AUTO: 38.2 % (ref 40.7–50.3)
HGB BLD-MCNC: 12.3 G/DL (ref 13–17)
IMM GRANULOCYTES # BLD AUTO: 0.02 K/UL (ref 0–0.3)
IMM GRANULOCYTES NFR BLD: 0 %
LYMPHOCYTES NFR BLD: 2.19 K/UL (ref 1.1–3.7)
LYMPHOCYTES RELATIVE PERCENT: 25 % (ref 24–43)
MCH RBC QN AUTO: 28.4 PG (ref 25.2–33.5)
MCHC RBC AUTO-ENTMCNC: 32.2 G/DL (ref 28.4–34.8)
MCV RBC AUTO: 88.2 FL (ref 82.6–102.9)
MONOCYTES NFR BLD: 0.69 K/UL (ref 0.1–1.2)
MONOCYTES NFR BLD: 8 % (ref 3–12)
NEUTROPHILS NFR BLD: 63 % (ref 36–65)
NEUTS SEG NFR BLD: 5.75 K/UL (ref 1.5–8.1)
NRBC BLD-RTO: 0 PER 100 WBC
PLATELET # BLD AUTO: 415 K/UL (ref 138–453)
PMV BLD AUTO: 8.8 FL (ref 8.1–13.5)
POTASSIUM SERPL-SCNC: 4 MMOL/L (ref 3.7–5.3)
PROT SERPL-MCNC: 6.7 G/DL (ref 6.4–8.3)
RBC # BLD AUTO: 4.33 M/UL (ref 4.21–5.77)
RBC # BLD: ABNORMAL 10*6/UL
SODIUM SERPL-SCNC: 141 MMOL/L (ref 135–144)
WBC OTHER # BLD: 9 K/UL (ref 3.5–11.3)

## 2023-09-05 PROCEDURE — 36591 DRAW BLOOD OFF VENOUS DEVICE: CPT

## 2023-09-05 PROCEDURE — 96376 TX/PRO/DX INJ SAME DRUG ADON: CPT

## 2023-09-05 PROCEDURE — 96415 CHEMO IV INFUSION ADDL HR: CPT

## 2023-09-05 PROCEDURE — 96417 CHEMO IV INFUS EACH ADDL SEQ: CPT

## 2023-09-05 PROCEDURE — 96416 CHEMO PROLONG INFUSE W/PUMP: CPT

## 2023-09-05 PROCEDURE — 96413 CHEMO IV INFUSION 1 HR: CPT

## 2023-09-05 PROCEDURE — 96411 CHEMO IV PUSH ADDL DRUG: CPT

## 2023-09-05 PROCEDURE — 96375 TX/PRO/DX INJ NEW DRUG ADDON: CPT

## 2023-09-05 PROCEDURE — 85025 COMPLETE CBC W/AUTO DIFF WBC: CPT

## 2023-09-05 PROCEDURE — 2580000003 HC RX 258: Performed by: INTERNAL MEDICINE

## 2023-09-05 PROCEDURE — 80053 COMPREHEN METABOLIC PANEL: CPT

## 2023-09-05 PROCEDURE — 96367 TX/PROPH/DG ADDL SEQ IV INF: CPT

## 2023-09-05 PROCEDURE — 6360000002 HC RX W HCPCS: Performed by: INTERNAL MEDICINE

## 2023-09-05 PROCEDURE — 96409 CHEMO IV PUSH SNGL DRUG: CPT

## 2023-09-05 PROCEDURE — 96368 THER/DIAG CONCURRENT INF: CPT

## 2023-09-05 RX ORDER — DIPHENHYDRAMINE HYDROCHLORIDE 50 MG/ML
50 INJECTION INTRAMUSCULAR; INTRAVENOUS
Status: CANCELLED | OUTPATIENT
Start: 2023-09-05

## 2023-09-05 RX ORDER — ATROPINE SULFATE 0.4 MG/ML
0.4 INJECTION, SOLUTION INTRAMUSCULAR; INTRAVENOUS; SUBCUTANEOUS
Status: CANCELLED | OUTPATIENT
Start: 2023-09-05

## 2023-09-05 RX ORDER — ACETAMINOPHEN 325 MG/1
650 TABLET ORAL
Status: CANCELLED | OUTPATIENT
Start: 2023-09-05

## 2023-09-05 RX ORDER — DEXAMETHASONE SODIUM PHOSPHATE 10 MG/ML
10 INJECTION INTRAMUSCULAR; INTRAVENOUS ONCE
Status: COMPLETED | OUTPATIENT
Start: 2023-09-05 | End: 2023-09-05

## 2023-09-05 RX ORDER — FAMOTIDINE 10 MG/ML
20 INJECTION, SOLUTION INTRAVENOUS
Status: CANCELLED | OUTPATIENT
Start: 2023-09-05

## 2023-09-05 RX ORDER — SODIUM CHLORIDE 9 MG/ML
5-250 INJECTION, SOLUTION INTRAVENOUS PRN
Status: CANCELLED | OUTPATIENT
Start: 2023-09-05

## 2023-09-05 RX ORDER — EPINEPHRINE 1 MG/ML
0.3 INJECTION, SOLUTION, CONCENTRATE INTRAVENOUS PRN
Status: CANCELLED | OUTPATIENT
Start: 2023-09-05

## 2023-09-05 RX ORDER — SODIUM CHLORIDE 0.9 % (FLUSH) 0.9 %
5-40 SYRINGE (ML) INJECTION PRN
Status: CANCELLED | OUTPATIENT
Start: 2023-09-05

## 2023-09-05 RX ORDER — SODIUM CHLORIDE 0.9 % (FLUSH) 0.9 %
5-40 SYRINGE (ML) INJECTION PRN
Status: CANCELLED | OUTPATIENT
Start: 2023-09-07

## 2023-09-05 RX ORDER — FLUOROURACIL 50 MG/ML
400 INJECTION, SOLUTION INTRAVENOUS ONCE
Status: CANCELLED | OUTPATIENT
Start: 2023-09-05 | End: 2023-09-05

## 2023-09-05 RX ORDER — FLUOROURACIL 50 MG/ML
400 INJECTION, SOLUTION INTRAVENOUS ONCE
Status: COMPLETED | OUTPATIENT
Start: 2023-09-05 | End: 2023-09-05

## 2023-09-05 RX ORDER — HEPARIN 100 UNIT/ML
500 SYRINGE INTRAVENOUS PRN
Status: DISCONTINUED | OUTPATIENT
Start: 2023-09-05 | End: 2023-09-06 | Stop reason: HOSPADM

## 2023-09-05 RX ORDER — SODIUM CHLORIDE 0.9 % (FLUSH) 0.9 %
5-40 SYRINGE (ML) INJECTION PRN
Status: DISCONTINUED | OUTPATIENT
Start: 2023-09-05 | End: 2023-09-06 | Stop reason: HOSPADM

## 2023-09-05 RX ORDER — SODIUM CHLORIDE 9 MG/ML
5-250 INJECTION, SOLUTION INTRAVENOUS PRN
Status: CANCELLED | OUTPATIENT
Start: 2023-09-07

## 2023-09-05 RX ORDER — ALBUTEROL SULFATE 90 UG/1
4 AEROSOL, METERED RESPIRATORY (INHALATION) PRN
Status: CANCELLED | OUTPATIENT
Start: 2023-09-05

## 2023-09-05 RX ORDER — MEPERIDINE HYDROCHLORIDE 50 MG/ML
12.5 INJECTION INTRAMUSCULAR; INTRAVENOUS; SUBCUTANEOUS PRN
Status: CANCELLED | OUTPATIENT
Start: 2023-09-05

## 2023-09-05 RX ORDER — ONDANSETRON 2 MG/ML
8 INJECTION INTRAMUSCULAR; INTRAVENOUS
Status: CANCELLED | OUTPATIENT
Start: 2023-09-05

## 2023-09-05 RX ORDER — DEXTROSE MONOHYDRATE 50 MG/ML
5-250 INJECTION, SOLUTION INTRAVENOUS PRN
Status: CANCELLED | OUTPATIENT
Start: 2023-09-05

## 2023-09-05 RX ORDER — HEPARIN SODIUM (PORCINE) LOCK FLUSH IV SOLN 100 UNIT/ML 100 UNIT/ML
500 SOLUTION INTRAVENOUS PRN
Status: CANCELLED | OUTPATIENT
Start: 2023-09-07

## 2023-09-05 RX ORDER — SODIUM CHLORIDE 9 MG/ML
INJECTION, SOLUTION INTRAVENOUS CONTINUOUS
Status: CANCELLED | OUTPATIENT
Start: 2023-09-05

## 2023-09-05 RX ORDER — HEPARIN SODIUM (PORCINE) LOCK FLUSH IV SOLN 100 UNIT/ML 100 UNIT/ML
500 SOLUTION INTRAVENOUS PRN
Status: CANCELLED | OUTPATIENT
Start: 2023-09-05

## 2023-09-05 RX ORDER — PALONOSETRON 0.05 MG/ML
0.25 INJECTION, SOLUTION INTRAVENOUS ONCE
Status: COMPLETED | OUTPATIENT
Start: 2023-09-05 | End: 2023-09-05

## 2023-09-05 RX ORDER — LIDOCAINE AND PRILOCAINE 25; 25 MG/G; MG/G
CREAM TOPICAL
Qty: 30 G | Refills: 2 | Status: SHIPPED | OUTPATIENT
Start: 2023-09-05

## 2023-09-05 RX ORDER — ATROPINE SULFATE 0.4 MG/ML
0.4 INJECTION, SOLUTION INTRAVENOUS ONCE
Status: COMPLETED | OUTPATIENT
Start: 2023-09-05 | End: 2023-09-05

## 2023-09-05 RX ORDER — DEXTROSE MONOHYDRATE 50 MG/ML
5-250 INJECTION, SOLUTION INTRAVENOUS PRN
Status: DISCONTINUED | OUTPATIENT
Start: 2023-09-05 | End: 2023-09-06 | Stop reason: HOSPADM

## 2023-09-05 RX ORDER — PALONOSETRON 0.05 MG/ML
0.25 INJECTION, SOLUTION INTRAVENOUS ONCE
Status: CANCELLED | OUTPATIENT
Start: 2023-09-05 | End: 2023-09-05

## 2023-09-05 RX ORDER — ONDANSETRON 4 MG/1
4 TABLET, FILM COATED ORAL EVERY 8 HOURS PRN
Qty: 90 TABLET | Refills: 2 | Status: SHIPPED | OUTPATIENT
Start: 2023-09-05

## 2023-09-05 RX ADMIN — DEXTROSE MONOHYDRATE 100 ML/HR: 50 INJECTION, SOLUTION INTRAVENOUS at 08:14

## 2023-09-05 RX ADMIN — LEUCOVORIN CALCIUM 700 MG: 350 INJECTION, POWDER, LYOPHILIZED, FOR SOLUTION INTRAMUSCULAR; INTRAVENOUS at 12:47

## 2023-09-05 RX ADMIN — FOSAPREPITANT DIMEGLUMINE 150 MG: 150 INJECTION, POWDER, LYOPHILIZED, FOR SOLUTION INTRAVENOUS at 09:48

## 2023-09-05 RX ADMIN — SODIUM CHLORIDE, PRESERVATIVE FREE 10 ML: 5 INJECTION INTRAVENOUS at 08:14

## 2023-09-05 RX ADMIN — PALONOSETRON 0.25 MG: 0.05 INJECTION, SOLUTION INTRAVENOUS at 09:43

## 2023-09-05 RX ADMIN — DEXAMETHASONE SODIUM PHOSPHATE 10 MG: 10 INJECTION INTRAMUSCULAR; INTRAVENOUS at 09:43

## 2023-09-05 RX ADMIN — ATROPINE SULFATE 0.4 MG: 0.4 INJECTION, SOLUTION INTRAVENOUS at 13:34

## 2023-09-05 RX ADMIN — FLUOROURACIL 700 MG: 50 INJECTION, SOLUTION INTRAVENOUS at 15:26

## 2023-09-05 RX ADMIN — DEXTROSE MONOHYDRATE 320 MG: 50 INJECTION, SOLUTION INTRAVENOUS at 12:49

## 2023-09-05 RX ADMIN — FLUOROURACIL 4250 MG: 50 INJECTION, SOLUTION INTRAVENOUS at 15:27

## 2023-09-05 RX ADMIN — OXALIPLATIN 150 MG: 5 INJECTION, SOLUTION INTRAVENOUS at 10:15

## 2023-09-05 NOTE — TELEPHONE ENCOUNTER
(P) [phone]  cell [phone]  Pt lives at [facility] , recent reconnect after 20 years with dtr. From [place]. May need  connection. Nurse also seeing pt at [facility] is Rufus.

## 2023-09-05 NOTE — TELEPHONE ENCOUNTER
Transport scheduled through [facility]. 9/5 p/u @ 7:00AM (8:00AM Appt) and 9/7 P/U @ 12:15 pm (1:15PM).      Aristeo Ortiz MSW, LSW

## 2023-09-05 NOTE — PROGRESS NOTES
Patient arrived ambulatory per self for cycle 1 day 1 treatment. Patient denies complaints or concerns. Port accessed;specimen sent. Labs reviewed. Patient premedicated. Chemotherapy teaching completed with [facility] sheets as well packet provided. Verbal teaching provided. Questions answered. Patient feels comfortable to proceed with treatment. Patient watched [employer] video. Consent signed. Patient premedicated. Oxaliplatin infused with no sign adverse reaction;line flushed. Irinotecan initiated and ran concurrently with Leucovorin. 39 minutes into infusion patient complains of abdominal pain as well as hot flashes. 136/72 hr 80 97.5 and 96% pulse oximeter. Irinotecan and Leucovorin stopped. 1334 Atropine 0.4 mg given IV push. 1337 133/76 97% oxygenation on room air. 1409  Patient states stomach pain gone and no further hot flashes. Irinotecan and Leucovorin restarted. Infusion completed with no further signs reaction. 5 FU given IV push with no sign reaction. 5FU pump connected to infuse over 46 hours. Pump settings verified by another RN. Pump infusing prior to patient leaving unit. Patient ambulated off unit per self at discharge. Printed calendar in hand.

## 2023-09-06 PROBLEM — R52 PAIN: Status: RESOLVED | Noted: 2023-08-07 | Resolved: 2023-09-06

## 2023-09-06 PROBLEM — Z01.818 PREOPERATIVE CLEARANCE: Status: RESOLVED | Noted: 2023-08-07 | Resolved: 2023-09-06

## 2023-09-07 ENCOUNTER — OFFICE VISIT (OUTPATIENT)
Dept: ONCOLOGY | Age: 58
End: 2023-09-07
Payer: MEDICARE

## 2023-09-07 ENCOUNTER — HOSPITAL ENCOUNTER (OUTPATIENT)
Dept: INFUSION THERAPY | Facility: MEDICAL CENTER | Age: 58
Discharge: HOME | End: 2023-09-07
Payer: MEDICARE

## 2023-09-07 ENCOUNTER — INITIAL CONSULT (OUTPATIENT)
Dept: ONCOLOGY | Age: 58
End: 2023-09-07
Payer: MEDICARE

## 2023-09-07 ENCOUNTER — CARE COORDINATION (OUTPATIENT)
Dept: CARE COORDINATION | Age: 58
End: 2023-09-07

## 2023-09-07 ENCOUNTER — TELEPHONE (OUTPATIENT)
Dept: ONCOLOGY | Age: 58
End: 2023-09-07

## 2023-09-07 VITALS
TEMPERATURE: 98.3 F | SYSTOLIC BLOOD PRESSURE: 90 MMHG | RESPIRATION RATE: 16 BRPM | DIASTOLIC BLOOD PRESSURE: 60 MMHG | WEIGHT: 143.9 LBS | BODY MASS INDEX: 21.25 KG/M2 | HEART RATE: 103 BPM

## 2023-09-07 DIAGNOSIS — C25.0 MALIGNANT NEOPLASM OF HEAD OF PANCREAS (HCC): ICD-10-CM

## 2023-09-07 DIAGNOSIS — C25.0 MALIGNANT NEOPLASM OF HEAD OF PANCREAS (HCC): Primary | ICD-10-CM

## 2023-09-07 PROCEDURE — 1111F DSCHRG MED/CURRENT MED MERGE: CPT | Performed by: INTERNAL MEDICINE

## 2023-09-07 PROCEDURE — 2580000003 HC RX 258: Performed by: INTERNAL MEDICINE

## 2023-09-07 PROCEDURE — 3017F COLORECTAL CA SCREEN DOC REV: CPT | Performed by: INTERNAL MEDICINE

## 2023-09-07 PROCEDURE — G8427 DOCREV CUR MEDS BY ELIG CLIN: HCPCS | Performed by: INTERNAL MEDICINE

## 2023-09-07 PROCEDURE — 96523 IRRIG DRUG DELIVERY DEVICE: CPT

## 2023-09-07 PROCEDURE — G8420 CALC BMI NORM PARAMETERS: HCPCS | Performed by: INTERNAL MEDICINE

## 2023-09-07 PROCEDURE — 96040 PR MEDICAL GENETICS COUNSELING EACH 30 MINUTES: CPT | Performed by: GENETIC COUNSELOR, MS

## 2023-09-07 PROCEDURE — 99999 PR OFFICE/OUTPT VISIT,PROCEDURE ONLY: CPT | Performed by: GENETIC COUNSELOR, MS

## 2023-09-07 PROCEDURE — 99211 OFF/OP EST MAY X REQ PHY/QHP: CPT | Performed by: INTERNAL MEDICINE

## 2023-09-07 PROCEDURE — 6360000002 HC RX W HCPCS: Performed by: INTERNAL MEDICINE

## 2023-09-07 PROCEDURE — 4004F PT TOBACCO SCREEN RCVD TLK: CPT | Performed by: INTERNAL MEDICINE

## 2023-09-07 PROCEDURE — 99214 OFFICE O/P EST MOD 30 MIN: CPT | Performed by: INTERNAL MEDICINE

## 2023-09-07 RX ORDER — OXYCODONE HYDROCHLORIDE AND ACETAMINOPHEN 5; 325 MG/1; MG/1
1 TABLET ORAL EVERY 8 HOURS PRN
Qty: 90 TABLET | Refills: 0 | Status: ON HOLD | OUTPATIENT
Start: 2023-09-07 | End: 2023-09-16 | Stop reason: HOSPADM

## 2023-09-07 RX ORDER — SODIUM CHLORIDE 0.9 % (FLUSH) 0.9 %
5-40 SYRINGE (ML) INJECTION PRN
Status: DISCONTINUED | OUTPATIENT
Start: 2023-09-07 | End: 2023-09-08 | Stop reason: HOSPADM

## 2023-09-07 RX ORDER — HEPARIN 100 UNIT/ML
500 SYRINGE INTRAVENOUS PRN
Status: DISCONTINUED | OUTPATIENT
Start: 2023-09-07 | End: 2023-09-08 | Stop reason: HOSPADM

## 2023-09-07 RX ORDER — OXYCODONE HYDROCHLORIDE AND ACETAMINOPHEN 5; 325 MG/1; MG/1
1 TABLET ORAL EVERY 8 HOURS PRN
Qty: 12 TABLET | Refills: 0 | Status: SHIPPED | OUTPATIENT
Start: 2023-09-07 | End: 2023-09-07 | Stop reason: SDUPTHER

## 2023-09-07 RX ADMIN — SODIUM CHLORIDE, PRESERVATIVE FREE 10 ML: 5 INJECTION INTRAVENOUS at 14:28

## 2023-09-07 RX ADMIN — Medication 500 UNITS: at 14:28

## 2023-09-07 NOTE — CARE COORDINATION
Writer met up with client, clients daughter and son who came from [place] and [place]. Writer went over all pertinent information to clients health and drsTenisha Appt for care of client. Daughter has decided she is taking client back to [place] to live with her for remainder of clients treatment and life span.  karin was present to provided feed back, references and information for palliative care and applying for medical POA. Client was in agreement with all plans set moving forward with relocation. [facility] spray and set fog so that he is not taking bugs with him. Daughter is aware of all circumstances at [facility] and states she has a flat bed truck to bring fathers belongs back. Client has a chemotherapy infusion appt today that daughter and son will be attending so that she can be provided with pertinent information to clients care and ask questions needed to move forward with relocation. [facility]. Writer will be following up with [facility] staff to inquire about follow up appts and medication so that client can have things established prior to moving.      Delvin Robledo RN, BSN  [employer]

## 2023-09-07 NOTE — TELEPHONE ENCOUNTER
Carrol Butts MD VISIT & TX  Change chemotherapy to gem Abraxane every 2 weeks  RTC next clinic  NEW ORDER IS PENDING [address] [facility] Adrianna BENITEZ VISIT 9/21/23 @ 2:45PM Adrianna @ 3PM  SCRIPTS SENT TO PT PHARMACY  AVS PRINTED W/ INSTRUCTIONS AND GIVEN TO PT ON EXIT Instructions: This plan will send the code FBSE to the PM system.  DO NOT or CHANGE the price. Detail Level: Simple Price (Do Not Change): 0.00

## 2023-09-07 NOTE — PROGRESS NOTES
[facility] [address] [facility]   [place], [address]   Interpretation Performed at [address] [facility] [place] [place], [address]           IMAGING DATA:      IR PORT PLACEMENT > 5 YEARS  Narrative: PROCEDURE:  ULTRASOUND GUIDED VASCULAR ACCESS. FLUOROSCOPY GUIDED PLACEMENT OF A SUBCUTANEOUS PORT-A-CATH.    8/28/2023. HISTORY:  ORDERING SYSTEM PROVIDED HISTORY: Malignant neoplasm of pancreas, unspecified  location of malignancy ([address])    SEDATION:  Local anesthesia plus IV fentanyl (50 mcg)    FLUOROSCOPY DOSE AND TYPE OR TIME AND EXPOSURES:  1.0 minute fluoroscopy time; dap: 1.0 Gy per cm2; air kerma: 4.8 mGy    TECHNIQUE:  Informed consent was obtained after a detailed explanation of the procedure  including risks, benefits, and alternatives. All aspects of maximum sterile  barrier technique were used including washing hands with alcohol-based hand  rub, skin preparation, cap, mask, sterile gown, sterile gloves, and sterile  full body drape. Local anesthesia was achieved with lidocaine. A  micropuncture needle was used to access the right internal jugular vein using  ultrasound guidance. (A sterile probe cover and gel were utilized.)  An  ultrasound image demonstrating patency of the vein with needle tip located  within it was obtained and stored in PACS. The micropuncture needle was  exchanged for a coaxial micro puncture dilator and sheath over a microwire. The microwire and dilator were exchanged for a 0.035 inch Amplatz wire which  was advanced into the IVC. A chest wall incision was made, and a subcutaneous  pocket was created. The port reservoir was placed within the pocket, and the  attached port tubing was tunneled subcutaneously to the venotomy site. A  peel-away sheath was advanced over the Amplatz wire during fluoroscopic  visualization.  The port tubing was cut to an appropriate length, and advanced  through the peel-away sheath under fluoroscopic guidance to the

## 2023-09-07 NOTE — PROGRESS NOTES
[facility]   Hereditary Cancer Risk Assessment     Name: Yoon Potter   YOB: 1965   Date of Consultation: 9/7/23   Referred by:   Fawn Maria MD  [address] [facility] [address]  [facility],  [address] [place] [address]    Mr. Yasmeen Wright was seen at the [address] [place] [address] for genetic counseling on 9/7/23. Mr. Yasmeen Wright was referred by Fawn Maria MD due to his personal diagnosis of pancreatic cancer. PERSONAL HISTORY   Mr. Yasmeen Wright is a 66 y.o.  male who was recently diagnosed with metastatic adenocarcinoma of the pancreas with liver mets. He is undergoing chemotherapy. FAMILY HISTORY  Mr. Yasmeen Wright has one daughter. He denies any known family history of cancer in his extended maternal or paternal families. Mr. Yasmeen Wright reports unknown ancestry and denies any known Ashkenazi Latter-day heritage. RISK ASSESSMENT   We discussed that approximately 5-10% of cancers are due to a hereditary gene mutation which causes an increased risk for certain cancers. Hereditary cancers are typically diagnosed at younger ages (under age 46y) and occur in multiple generations of a family. Multiple individuals with the same type of cancer (example: breast or colorectal) or uncommon cancers (example: ovarian, pancreatic, male breast cancer) are also features of hereditary cancers. In summary, Mr. Yasmeen Wright meets the Francisco Butts (NCCN) guidelines for genetic testing of the BRCA1/2 genes due to his personal diagnosis of pancreatic cancer. The NCCN guidelines also recognize that an individual's personal and/or family history may be explained by more than one inherited cancer syndrome. Thus, a multi-gene panel may be more efficient, more cost effecting, and increases the yield of detecting a hereditary mutation which would impact medical management.  Given his personal and/or family history, we recommend testing for the following

## 2023-09-07 NOTE — CARE COORDINATION
Writer communicated with client regarding new medications orders per [facility] infusion. Writer went to  zofran for nausea. Pharmacy staffed stated that lidocaine for chemo port was not available due to needing a pre authorization. Client is aware and verbalized understanding of medication unavailability. Client and his daughter will speak with  On 9/7 during infusion appt to see if their is an alternative option if insurance does not cover lidocaine for port. Fabio Lopes RN, BSN  [employer]       Current Outpatient Medications:     ondansetron (ZOFRAN) 4 MG tablet, Take 1 tablet by mouth every 8 hours as needed for Nausea or Vomiting, Disp: 90 tablet, Rfl: 2    lidocaine-prilocaine (EMLA) 2.5-2.5 % cream, Apply topically to port as needed prior to treatment. , Disp: 30 g, Rfl: 2    vitamin B-1 (THIAMINE) 100 MG tablet, Take 1 tablet by mouth daily, Disp: , Rfl:     apixaban (ELIQUIS) 5 MG TABS tablet, Take 1 tablet by mouth 2 times daily, Disp: 60 tablet, Rfl: 0    metoclopramide (REGLAN) 10 MG tablet, Take 1 tablet by mouth 4 times daily (before meals and nightly), Disp: 120 tablet, Rfl: 0    pantoprazole (PROTONIX) 40 MG tablet, Take 1 tablet by mouth 2 times daily (before meals), Disp: 60 tablet, Rfl: 0    folic acid (FOLVITE) 1 MG tablet, Take 1 tablet by mouth daily, Disp: 30 tablet, Rfl: 3      Goals        Care Coordination Self Management      CC Self Management Goal  Patient Goal (What steps will patient take to achieve goal?): take prescribed medications and become knowledgeable in self care and when to report symptoms, signs of increasing illness.    Patient is able to discuss self-management of condition(s):  Pt demonstrates adherence to medications  Pt demonstrates understanding of self-monitoring  Patient is able to identify Red Flags:  Alert to potential adverse drug reactions(s) or side effects and actions to take should they arise  Discuss target symptoms and actions to

## 2023-09-07 NOTE — PROGRESS NOTES
Patient arrived ambulatory to unit for 5FU pump removal after physician visit at front office. Denies complaints or concerns. Chemo cartridge empty upon arrival to unit. Pump disconnected. Port flushed and heparinized with intact stockton needle removed per protocol. Patient ambulated off unit with family at discharge.

## 2023-09-08 RX ORDER — OXYCODONE HYDROCHLORIDE AND ACETAMINOPHEN 5; 325 MG/1; MG/1
1 TABLET ORAL EVERY 8 HOURS PRN
Qty: 90 TABLET | Refills: 0 | Status: SHIPPED | OUTPATIENT
Start: 2023-09-08 | End: 2023-10-08

## 2023-09-12 ENCOUNTER — TELEPHONE (OUTPATIENT)
Dept: INFUSION THERAPY | Facility: MEDICAL CENTER | Age: 58
End: 2023-09-12

## 2023-09-12 NOTE — TELEPHONE ENCOUNTER
9-11-23 message from daughter Kalani Santos to send referral to a place in [place] as Mallory Willingham is moving   9-12-23 called daughter, message left to call our office and provide a doctors office in [place] name and phone number and we will reach out to them and fax records

## 2023-09-13 NOTE — TELEPHONE ENCOUNTER
Return call placed to daughter Eneida Anguiano. Referral to be faxed to [facility] in [place]. Referral faxed with confirmation  to [phone]. P - [phone].

## 2023-09-14 ENCOUNTER — CARE COORDINATION (OUTPATIENT)
Dept: CARE COORDINATION | Age: 58
End: 2023-09-14

## 2023-09-14 ENCOUNTER — APPOINTMENT (OUTPATIENT)
Dept: GENERAL RADIOLOGY | Age: 58
End: 2023-09-14
Payer: MEDICARE

## 2023-09-14 ENCOUNTER — HOSPITAL ENCOUNTER (OUTPATIENT)
Facility: MEDICAL CENTER | Age: 58
End: 2023-09-14

## 2023-09-14 ENCOUNTER — TELEPHONE (OUTPATIENT)
Dept: INFUSION THERAPY | Facility: MEDICAL CENTER | Age: 58
End: 2023-09-14

## 2023-09-14 ENCOUNTER — APPOINTMENT (OUTPATIENT)
Dept: CT IMAGING | Age: 58
End: 2023-09-14
Payer: MEDICARE

## 2023-09-14 ENCOUNTER — HOSPITAL ENCOUNTER (INPATIENT)
Age: 58
LOS: 2 days | Discharge: HOME | End: 2023-09-16
Attending: EMERGENCY MEDICINE
Payer: MEDICARE

## 2023-09-14 DIAGNOSIS — J18.9 PNEUMONIA OF RIGHT LOWER LOBE DUE TO INFECTIOUS ORGANISM: ICD-10-CM

## 2023-09-14 DIAGNOSIS — K86.89 PANCREATIC MASS: ICD-10-CM

## 2023-09-14 DIAGNOSIS — C25.0 CANCER OF HEAD OF PANCREAS (HCC): ICD-10-CM

## 2023-09-14 DIAGNOSIS — A41.9 SEPTICEMIA (HCC): Primary | ICD-10-CM

## 2023-09-14 DIAGNOSIS — R09.02 HYPOXIA: ICD-10-CM

## 2023-09-14 DIAGNOSIS — G89.3 CANCER-RELATED BREAKTHROUGH PAIN: ICD-10-CM

## 2023-09-14 PROBLEM — R05.8 PRODUCTIVE COUGH: Status: ACTIVE | Noted: 2023-09-14

## 2023-09-14 PROBLEM — Z87.09 HISTORY OF COPD: Status: ACTIVE | Noted: 2023-09-14

## 2023-09-14 PROBLEM — R50.9 FEVER: Status: ACTIVE | Noted: 2023-09-14

## 2023-09-14 PROBLEM — Z86.711 HISTORY OF PULMONARY EMBOLUS (PE): Status: ACTIVE | Noted: 2023-09-14

## 2023-09-14 PROBLEM — J16.0 CAP (COMMUNITY ACQUIRED PNEUMONIA) DUE TO CHLAMYDIA SPECIES: Status: ACTIVE | Noted: 2023-09-14

## 2023-09-14 PROBLEM — E87.20 LACTIC ACIDOSIS: Status: ACTIVE | Noted: 2023-09-14

## 2023-09-14 LAB
ALBUMIN SERPL-MCNC: 4 G/DL (ref 3.5–5.2)
ALBUMIN/GLOB SERPL: 1 {RATIO}
ALP SERPL-CCNC: 101 U/L (ref 40–129)
ALT SERPL-CCNC: 13 U/L (ref 10–50)
ANION GAP SERPL CALCULATED.3IONS-SCNC: 11 MMOL/L (ref 9–16)
AST SERPL-CCNC: 20 U/L (ref 10–50)
BASOPHILS # BLD: <0.03 K/UL (ref 0–0.2)
BASOPHILS NFR BLD: 0 % (ref 0–2)
BILIRUB SERPL-MCNC: 0.3 MG/DL (ref 0–1.2)
BILIRUB UR QL STRIP: NEGATIVE
BUN SERPL-MCNC: 12 MG/DL (ref 6–20)
CALCIUM SERPL-MCNC: 9.5 MG/DL (ref 8.6–10.4)
CASTS #/AREA URNS LPF: ABNORMAL /LPF (ref 0–8)
CHLORIDE SERPL-SCNC: 99 MMOL/L (ref 98–107)
CLARITY UR: CLEAR
CO2 SERPL-SCNC: 25 MMOL/L (ref 20–31)
COLOR UR: YELLOW
CREAT SERPL-MCNC: 0.8 MG/DL (ref 0.7–1.2)
EOSINOPHIL # BLD: 0.06 K/UL (ref 0–0.44)
EOSINOPHILS RELATIVE PERCENT: 1 % (ref 1–4)
EPI CELLS #/AREA URNS HPF: ABNORMAL /HPF (ref 0–5)
ERYTHROCYTE [DISTWIDTH] IN BLOOD BY AUTOMATED COUNT: 14.3 % (ref 11.8–14.4)
GFR SERPL CREATININE-BSD FRML MDRD: >60 ML/MIN/1.73M2
GLUCOSE SERPL-MCNC: 174 MG/DL (ref 74–99)
GLUCOSE UR STRIP-MCNC: NEGATIVE MG/DL
HCT VFR BLD AUTO: 36.3 % (ref 40.7–50.3)
HGB BLD-MCNC: 11.7 G/DL (ref 13–17)
HGB UR QL STRIP.AUTO: ABNORMAL
IMM GRANULOCYTES # BLD AUTO: <0.03 K/UL (ref 0–0.3)
IMM GRANULOCYTES NFR BLD: 0 %
INR PPP: 1.3
KETONES UR STRIP-MCNC: ABNORMAL MG/DL
LACTIC ACID, SEPSIS WHOLE BLOOD: 1.2 MMOL/L (ref 0.5–1.9)
LACTIC ACID, SEPSIS WHOLE BLOOD: 3.1 MMOL/L (ref 0.5–1.9)
LACTIC ACID, WHOLE BLOOD: 1.3 MMOL/L (ref 0.7–2.1)
LEUKOCYTE ESTERASE UR QL STRIP: NEGATIVE
LYMPHOCYTES NFR BLD: 1.11 K/UL (ref 1.1–3.7)
LYMPHOCYTES RELATIVE PERCENT: 12 % (ref 24–43)
MCH RBC QN AUTO: 28.7 PG (ref 25.2–33.5)
MCHC RBC AUTO-ENTMCNC: 32.2 G/DL (ref 28.4–34.8)
MCV RBC AUTO: 89 FL (ref 82.6–102.9)
MONOCYTES NFR BLD: 0.75 K/UL (ref 0.1–1.2)
MONOCYTES NFR BLD: 8 % (ref 3–12)
NEUTROPHILS NFR BLD: 79 % (ref 36–65)
NEUTS SEG NFR BLD: 7.73 K/UL (ref 1.5–8.1)
NITRITE UR QL STRIP: NEGATIVE
NRBC BLD-RTO: 0 PER 100 WBC
PH UR STRIP: 5.5 [PH] (ref 5–8)
PLATELET # BLD AUTO: 420 K/UL (ref 138–453)
PMV BLD AUTO: 8.7 FL (ref 8.1–13.5)
POTASSIUM SERPL-SCNC: 4.7 MMOL/L (ref 3.7–5.3)
PROT SERPL-MCNC: 7.3 G/DL (ref 6.6–8.7)
PROT UR STRIP-MCNC: ABNORMAL MG/DL
PROTHROMBIN TIME: 16.1 SEC (ref 11.7–14.9)
RBC # BLD AUTO: 4.08 M/UL (ref 4.21–5.77)
RBC #/AREA URNS HPF: ABNORMAL /HPF (ref 0–4)
SODIUM SERPL-SCNC: 135 MMOL/L (ref 136–145)
SP GR UR STRIP: 1.05 (ref 1–1.03)
TROPONIN I SERPL HS-MCNC: 8 NG/L (ref 0–22)
TROPONIN I SERPL HS-MCNC: <6 NG/L (ref 0–22)
UROBILINOGEN UR STRIP-ACNC: NORMAL EU/DL (ref 0–1)
WBC #/AREA URNS HPF: ABNORMAL /HPF (ref 0–5)
WBC OTHER # BLD: 9.7 K/UL (ref 3.5–11.3)

## 2023-09-14 PROCEDURE — 1200000000 HC SEMI PRIVATE

## 2023-09-14 PROCEDURE — 6360000002 HC RX W HCPCS: Performed by: STUDENT IN AN ORGANIZED HEALTH CARE EDUCATION/TRAINING PROGRAM

## 2023-09-14 PROCEDURE — 6370000000 HC RX 637 (ALT 250 FOR IP): Performed by: STUDENT IN AN ORGANIZED HEALTH CARE EDUCATION/TRAINING PROGRAM

## 2023-09-14 PROCEDURE — 99222 1ST HOSP IP/OBS MODERATE 55: CPT | Performed by: STUDENT IN AN ORGANIZED HEALTH CARE EDUCATION/TRAINING PROGRAM

## 2023-09-14 PROCEDURE — 93005 ELECTROCARDIOGRAM TRACING: CPT | Performed by: STUDENT IN AN ORGANIZED HEALTH CARE EDUCATION/TRAINING PROGRAM

## 2023-09-14 PROCEDURE — 71046 X-RAY EXAM CHEST 2 VIEWS: CPT

## 2023-09-14 PROCEDURE — 96365 THER/PROPH/DIAG IV INF INIT: CPT

## 2023-09-14 PROCEDURE — 2580000003 HC RX 258: Performed by: STUDENT IN AN ORGANIZED HEALTH CARE EDUCATION/TRAINING PROGRAM

## 2023-09-14 PROCEDURE — 80053 COMPREHEN METABOLIC PANEL: CPT

## 2023-09-14 PROCEDURE — 83605 ASSAY OF LACTIC ACID: CPT

## 2023-09-14 PROCEDURE — 71260 CT THORAX DX C+: CPT

## 2023-09-14 PROCEDURE — 84484 ASSAY OF TROPONIN QUANT: CPT

## 2023-09-14 PROCEDURE — 99285 EMERGENCY DEPT VISIT HI MDM: CPT

## 2023-09-14 PROCEDURE — 85025 COMPLETE CBC W/AUTO DIFF WBC: CPT

## 2023-09-14 PROCEDURE — 87449 NOS EACH ORGANISM AG IA: CPT

## 2023-09-14 PROCEDURE — 36415 COLL VENOUS BLD VENIPUNCTURE: CPT

## 2023-09-14 PROCEDURE — 87899 AGENT NOS ASSAY W/OPTIC: CPT

## 2023-09-14 PROCEDURE — 87040 BLOOD CULTURE FOR BACTERIA: CPT

## 2023-09-14 PROCEDURE — 87070 CULTURE OTHR SPECIMN AEROBIC: CPT

## 2023-09-14 PROCEDURE — 96375 TX/PRO/DX INJ NEW DRUG ADDON: CPT

## 2023-09-14 PROCEDURE — 86738 MYCOPLASMA ANTIBODY: CPT

## 2023-09-14 PROCEDURE — 6360000004 HC RX CONTRAST MEDICATION: Performed by: STUDENT IN AN ORGANIZED HEALTH CARE EDUCATION/TRAINING PROGRAM

## 2023-09-14 PROCEDURE — 85610 PROTHROMBIN TIME: CPT

## 2023-09-14 PROCEDURE — 81001 URINALYSIS AUTO W/SCOPE: CPT

## 2023-09-14 RX ORDER — METOCLOPRAMIDE 10 MG/1
10 TABLET ORAL
Status: DISCONTINUED | OUTPATIENT
Start: 2023-09-14 | End: 2023-09-15

## 2023-09-14 RX ORDER — SODIUM CHLORIDE 0.9 % (FLUSH) 0.9 %
5-40 SYRINGE (ML) INJECTION PRN
Status: DISCONTINUED | OUTPATIENT
Start: 2023-09-14 | End: 2023-09-16 | Stop reason: HOSPADM

## 2023-09-14 RX ORDER — ONDANSETRON 2 MG/ML
4 INJECTION INTRAMUSCULAR; INTRAVENOUS EVERY 6 HOURS PRN
Status: DISCONTINUED | OUTPATIENT
Start: 2023-09-14 | End: 2023-09-16 | Stop reason: HOSPADM

## 2023-09-14 RX ORDER — ACETAMINOPHEN 325 MG/1
650 TABLET ORAL ONCE
Status: COMPLETED | OUTPATIENT
Start: 2023-09-14 | End: 2023-09-14

## 2023-09-14 RX ORDER — PANTOPRAZOLE SODIUM 40 MG/1
40 TABLET, DELAYED RELEASE ORAL
Status: DISCONTINUED | OUTPATIENT
Start: 2023-09-15 | End: 2023-09-16 | Stop reason: HOSPADM

## 2023-09-14 RX ORDER — 0.9 % SODIUM CHLORIDE 0.9 %
30 INTRAVENOUS SOLUTION INTRAVENOUS ONCE
Status: COMPLETED | OUTPATIENT
Start: 2023-09-14 | End: 2023-09-14

## 2023-09-14 RX ORDER — ACETAMINOPHEN 325 MG/1
650 TABLET ORAL EVERY 6 HOURS PRN
Status: DISCONTINUED | OUTPATIENT
Start: 2023-09-14 | End: 2023-09-16 | Stop reason: HOSPADM

## 2023-09-14 RX ORDER — FENTANYL CITRATE 50 UG/ML
50 INJECTION, SOLUTION INTRAMUSCULAR; INTRAVENOUS ONCE
Status: COMPLETED | OUTPATIENT
Start: 2023-09-14 | End: 2023-09-14

## 2023-09-14 RX ORDER — THIAMINE MONONITRATE (VIT B1) 100 MG
100 TABLET ORAL DAILY
Status: DISCONTINUED | OUTPATIENT
Start: 2023-09-14 | End: 2023-09-16 | Stop reason: HOSPADM

## 2023-09-14 RX ORDER — ACETAMINOPHEN 650 MG/1
650 SUPPOSITORY RECTAL EVERY 6 HOURS PRN
Status: DISCONTINUED | OUTPATIENT
Start: 2023-09-14 | End: 2023-09-16 | Stop reason: HOSPADM

## 2023-09-14 RX ORDER — SODIUM CHLORIDE 9 MG/ML
INJECTION, SOLUTION INTRAVENOUS PRN
Status: DISCONTINUED | OUTPATIENT
Start: 2023-09-14 | End: 2023-09-16 | Stop reason: HOSPADM

## 2023-09-14 RX ORDER — OXYCODONE HYDROCHLORIDE AND ACETAMINOPHEN 5; 325 MG/1; MG/1
1 TABLET ORAL EVERY 8 HOURS PRN
Status: DISCONTINUED | OUTPATIENT
Start: 2023-09-14 | End: 2023-09-15

## 2023-09-14 RX ORDER — POLYETHYLENE GLYCOL 3350 17 G/17G
17 POWDER, FOR SOLUTION ORAL DAILY PRN
Status: DISCONTINUED | OUTPATIENT
Start: 2023-09-14 | End: 2023-09-16 | Stop reason: HOSPADM

## 2023-09-14 RX ORDER — SODIUM CHLORIDE 9 MG/ML
INJECTION, SOLUTION INTRAVENOUS CONTINUOUS
Status: DISCONTINUED | OUTPATIENT
Start: 2023-09-14 | End: 2023-09-16 | Stop reason: HOSPADM

## 2023-09-14 RX ORDER — MORPHINE SULFATE 4 MG/ML
4 INJECTION INTRAVENOUS ONCE
Status: COMPLETED | OUTPATIENT
Start: 2023-09-14 | End: 2023-09-14

## 2023-09-14 RX ORDER — ONDANSETRON 4 MG/1
4 TABLET, FILM COATED ORAL EVERY 8 HOURS PRN
Status: DISCONTINUED | OUTPATIENT
Start: 2023-09-14 | End: 2023-09-16 | Stop reason: HOSPADM

## 2023-09-14 RX ORDER — SODIUM CHLORIDE 0.9 % (FLUSH) 0.9 %
5-40 SYRINGE (ML) INJECTION EVERY 12 HOURS SCHEDULED
Status: DISCONTINUED | OUTPATIENT
Start: 2023-09-14 | End: 2023-09-16 | Stop reason: HOSPADM

## 2023-09-14 RX ORDER — ONDANSETRON 4 MG/1
4 TABLET, ORALLY DISINTEGRATING ORAL EVERY 8 HOURS PRN
Status: DISCONTINUED | OUTPATIENT
Start: 2023-09-14 | End: 2023-09-16 | Stop reason: HOSPADM

## 2023-09-14 RX ORDER — FOLIC ACID 1 MG/1
1 TABLET ORAL DAILY
Status: DISCONTINUED | OUTPATIENT
Start: 2023-09-14 | End: 2023-09-16 | Stop reason: HOSPADM

## 2023-09-14 RX ADMIN — AZITHROMYCIN MONOHYDRATE 500 MG: 500 INJECTION, POWDER, LYOPHILIZED, FOR SOLUTION INTRAVENOUS at 17:04

## 2023-09-14 RX ADMIN — FENTANYL CITRATE 50 MCG: 50 INJECTION, SOLUTION INTRAMUSCULAR; INTRAVENOUS at 17:20

## 2023-09-14 RX ADMIN — MORPHINE SULFATE 4 MG: 4 INJECTION INTRAVENOUS at 17:50

## 2023-09-14 RX ADMIN — IOPAMIDOL 75 ML: 755 INJECTION, SOLUTION INTRAVENOUS at 16:29

## 2023-09-14 RX ADMIN — CEFTRIAXONE SODIUM 1000 MG: 1 INJECTION, POWDER, FOR SOLUTION INTRAMUSCULAR; INTRAVENOUS at 16:42

## 2023-09-14 RX ADMIN — SODIUM CHLORIDE, PRESERVATIVE FREE 10 ML: 5 INJECTION INTRAVENOUS at 20:30

## 2023-09-14 RX ADMIN — OXYCODONE AND ACETAMINOPHEN 1 TABLET: 5; 325 TABLET ORAL at 18:38

## 2023-09-14 RX ADMIN — SODIUM CHLORIDE 1953 ML: 9 INJECTION, SOLUTION INTRAVENOUS at 15:07

## 2023-09-14 RX ADMIN — ONDANSETRON 4 MG: 2 INJECTION INTRAMUSCULAR; INTRAVENOUS at 18:23

## 2023-09-14 RX ADMIN — FENTANYL CITRATE 50 MCG: 50 INJECTION, SOLUTION INTRAMUSCULAR; INTRAVENOUS at 15:06

## 2023-09-14 RX ADMIN — METOCLOPRAMIDE 10 MG: 10 TABLET ORAL at 20:30

## 2023-09-14 RX ADMIN — SODIUM CHLORIDE: 9 INJECTION, SOLUTION INTRAVENOUS at 20:19

## 2023-09-14 RX ADMIN — ACETAMINOPHEN 650 MG: 325 TABLET ORAL at 15:06

## 2023-09-14 ASSESSMENT — PAIN DESCRIPTION - PAIN TYPE: TYPE: ACUTE PAIN

## 2023-09-14 ASSESSMENT — PAIN SCALES - GENERAL
PAINLEVEL_OUTOF10: 8
PAINLEVEL_OUTOF10: 10
PAINLEVEL_OUTOF10: 8
PAINLEVEL_OUTOF10: 10
PAINLEVEL_OUTOF10: 0

## 2023-09-14 ASSESSMENT — ENCOUNTER SYMPTOMS
ABDOMINAL PAIN: 1
COUGH: 1
VOMITING: 0
NAUSEA: 0
SHORTNESS OF BREATH: 1

## 2023-09-14 ASSESSMENT — PAIN DESCRIPTION - LOCATION: LOCATION: ABDOMEN

## 2023-09-14 ASSESSMENT — PAIN DESCRIPTION - FREQUENCY: FREQUENCY: CONTINUOUS

## 2023-09-14 ASSESSMENT — PAIN - FUNCTIONAL ASSESSMENT: PAIN_FUNCTIONAL_ASSESSMENT: 0-10

## 2023-09-14 NOTE — ED NOTES
Pt given warm blankets, updated on waiting for bed to be cleaned.       Adolfo Maloney RN  09/14/23 5932

## 2023-09-14 NOTE — PLAN OF CARE
Spoke with Jenae Cates, patient's family member who is requesting for transfer to [place]. We discussed that patient is being admitted for pneumonia likely will be discharged in a few days.   She voices understanding, requesting his daughter Ada Kaufman be placed on contact list.    Bart Landrum, DO

## 2023-09-14 NOTE — CARE COORDINATION
Plan:  Writer spoke with clients daughter today Jacqueline Sheets in regards to oncology referral for [place]. Clients daughter was having a hard time getting in contact with someone at [facility] to send referral over to [address] [place] [address] Place was able to speak with someone at [facility] to get referral sent over. Writer went to go visit client today at [facility] [place] and client stated he did not feel well. Client states he has chills and is coughing up grey sputum and his back hurts. Writer advised client to please go to the ER, due to having low immune system and high changes of catching infection due to living environment. Client agreed and writer called cab for client to be transferred to [facility]. Housing:  Clients daughter will arrive Saturday to transport client to [facility] for remainder of treatment.     Davon Alas RN, BSN    [employer]

## 2023-09-14 NOTE — ED NOTES
Pt to ED c/o chills, productive cough with gray sputum, abdominal pain, dizziness, and fatigue. Pt states he was dx with stage IV pancreatic cancer this year and is supposed to be transferring his chemo treatment to [place] soon. Pt states that he spoke with his home mari nurse today who recommended he come to the ED. Pt states that he called ems last night at home who checked him at that time and stated that his vitals were normal and didn't need to come to the ED. Pt on arrival 90% on RA with labored breathing, denies home O2 use currently.       Denver Rebel, RN  09/14/23 0210

## 2023-09-14 NOTE — ED PROVIDER NOTES
[address] ED  Emergency Department Encounter  Emergency Medicine Resident     Pt Michael Mcqueen  MRN: [id number]  [address] 1965  Date of evaluation: 9/14/23  PCP:  No primary care provider on file. Note Started: 3:34 PM EDT      CHIEF COMPLAINT       Chief Complaint   Patient presents with    Abdominal Pain       HISTORY OF PRESENT ILLNESS  (Location/Symptom, Timing/Onset, Context/Setting, Quality, Duration, Modifying Factors, Severity.)      Mima Arriola is a 66 y.o. male who presents with chest pain, back pain, abdominal pain has been ongoing for the past 2 days has been worsening to the point where patient came to the emergency department. He reports that last month he was diagnosed with pulmonary embolism and started on anticoagulation. Patient takes Eliquis. He has been having a productive cough of gray sputum. No nausea or vomiting but patient does report taking Zofran daily. Patient does have a history of pancreatic adenocarcinoma and is currently on chemotherapy. He is currently rating his pain as an 8 out of 10. He reports that his chest pain is like a crack in his ribs. Reports epigastric abdominal pain that is nonradiating. PAST MEDICAL / SURGICAL / SOCIAL / FAMILY HISTORY      has a past medical history of Abdominal pain, COVID-19 vaccine series completed, Duodenitis, Elevated CEA, Fatty liver, GERD (gastroesophageal reflux disease), History of recent hospitalization, Pancreatic cancer ([address]), Retroperitoneal mass, and Weight loss. has a past surgical history that includes Hand surgery; Urethra surgery; Upper gastrointestinal endoscopy (N/A, 07/24/2023); ERCP (N/A, 07/24/2023); Upper gastrointestinal endoscopy (07/24/2023); IR GUIDED IVC FILTER PLACEMENT (08/08/2023); Hemicoloectomy w/ anastomosis (08/09/2023); Stomach surgery (N/A, 08/09/2023); and IR PORT PLACEMENT > 5 YEARS (8/28/2023).     Social History     Socioeconomic History    Marital status: Single Spouse name: Not on file    Number of children: Not on file    Years of education: Not on file    Highest education level: Not on file   Occupational History    Not on file   Tobacco Use    Smoking status: Some Days     Packs/day: 1.00     Years: 25.00     Additional pack years: 0.00     Total pack years: 25.00     Types: Cigars, Cigarettes    Smokeless tobacco: Never    Tobacco comments:     Pt stated quit smoking cigarettes 7 years ago & currently smokes cigars @ times.  smoking cessation referral offered, pt declined @ this time   Vaping Use    Vaping Use: Never used   Substance and Sexual Activity    Alcohol use: Not Currently     Comment: Pt stated heavy drinker & quit 13 years ago    Drug use: Not Currently     Types: Marijuana Cayden Bhatt)     Comment: quit 20 years ago    Sexual activity: Never   Other Topics Concern    Not on file   Social History Narrative    Not on file     Social Determinants of Health     Financial Resource Strain: Not on file   Food Insecurity: Not on file   Transportation Needs: Not on file   Physical Activity: Not on file   Stress: Not on file   Social Connections: Not on file   Intimate Partner Violence: Not on file   Housing Stability: Not on file       History reviewed. No pertinent family history. Allergies:  Aspirin, Celery (apium graveolens octaviano. sergei) skin test, Ciprofloxacin, and Food    Home Medications:  Prior to Admission medications    Medication Sig Start Date End Date Taking? Authorizing Provider   oxyCODONE-acetaminophen (PERCOCET) 5-325 MG per tablet Take 1 tablet by mouth every 8 hours as needed for Pain for up to 30 days. Max Daily Amount: 3 tablets 9/8/23 10/8/23  Beverly Calabrese MD   oxyCODONE-acetaminophen (PERCOCET) 5-325 MG per tablet Take 1 tablet by mouth every 8 hours as needed for Pain for up to 30 days. Intended supply: 3 days.  Take lowest dose possible to manage pain Max Daily Amount: 3 tablets 9/7/23 10/7/23  Beverly Calabrese MD   ondansetron

## 2023-09-14 NOTE — TELEPHONE ENCOUNTER
Pt dtr calling and req information on pt sent to [facility] hematology oncology in [place] (p) [phone] (f) [phone]  Dtr called yesterday and today to asap have pt info sent to above, and writer spoke with Dr Kyung Essex today and aware of referral and OK to send pt information.   Md note and consult info with Darek Links sent with confirmation

## 2023-09-15 LAB
ANION GAP SERPL CALCULATED.3IONS-SCNC: 6 MMOL/L (ref 9–17)
B PARAP IS1001 DNA NPH QL NAA+NON-PROBE: NOT DETECTED
B PERT DNA SPEC QL NAA+PROBE: NOT DETECTED
BASOPHILS # BLD: <0.03 K/UL (ref 0–0.2)
BASOPHILS NFR BLD: 0 % (ref 0–2)
BUN SERPL-MCNC: 8 MG/DL (ref 6–20)
C PNEUM DNA NPH QL NAA+NON-PROBE: NOT DETECTED
CALCIUM SERPL-MCNC: 8.6 MG/DL (ref 8.6–10.4)
CHLORIDE SERPL-SCNC: 104 MMOL/L (ref 98–107)
CO2 SERPL-SCNC: 23 MMOL/L (ref 20–31)
CREAT SERPL-MCNC: 0.6 MG/DL (ref 0.7–1.2)
EOSINOPHIL # BLD: 0.15 K/UL (ref 0–0.44)
EOSINOPHILS RELATIVE PERCENT: 2 % (ref 1–4)
ERYTHROCYTE [DISTWIDTH] IN BLOOD BY AUTOMATED COUNT: 14.1 % (ref 11.8–14.4)
FLUAV RNA NPH QL NAA+NON-PROBE: NOT DETECTED
FLUBV RNA NPH QL NAA+NON-PROBE: NOT DETECTED
GFR SERPL CREATININE-BSD FRML MDRD: >60 ML/MIN/1.73M2
GLUCOSE SERPL-MCNC: 89 MG/DL (ref 70–99)
HADV DNA NPH QL NAA+NON-PROBE: NOT DETECTED
HCOV 229E RNA NPH QL NAA+NON-PROBE: NOT DETECTED
HCOV HKU1 RNA NPH QL NAA+NON-PROBE: NOT DETECTED
HCOV NL63 RNA NPH QL NAA+NON-PROBE: NOT DETECTED
HCOV OC43 RNA NPH QL NAA+NON-PROBE: NOT DETECTED
HCT VFR BLD AUTO: 31.8 % (ref 40.7–50.3)
HGB BLD-MCNC: 10.1 G/DL (ref 13–17)
HMPV RNA NPH QL NAA+NON-PROBE: NOT DETECTED
HPIV1 RNA NPH QL NAA+NON-PROBE: NOT DETECTED
HPIV2 RNA NPH QL NAA+NON-PROBE: NOT DETECTED
HPIV3 RNA NPH QL NAA+NON-PROBE: NOT DETECTED
HPIV4 RNA NPH QL NAA+NON-PROBE: NOT DETECTED
IMM GRANULOCYTES # BLD AUTO: <0.03 K/UL (ref 0–0.3)
IMM GRANULOCYTES NFR BLD: 0 %
L PNEUMO1 AG UR QL IA.RAPID: NEGATIVE
LACTIC ACID, WHOLE BLOOD: 1 MMOL/L (ref 0.7–2.1)
LACTIC ACID, WHOLE BLOOD: 2.5 MMOL/L (ref 0.7–2.1)
LYMPHOCYTES NFR BLD: 1.95 K/UL (ref 1.1–3.7)
LYMPHOCYTES RELATIVE PERCENT: 24 % (ref 24–43)
M PNEUMO DNA NPH QL NAA+NON-PROBE: NOT DETECTED
M PNEUMO IGM SER QL IA: 1.1
MCH RBC QN AUTO: 28.1 PG (ref 25.2–33.5)
MCHC RBC AUTO-ENTMCNC: 31.8 G/DL (ref 28.4–34.8)
MCV RBC AUTO: 88.6 FL (ref 82.6–102.9)
MICROORGANISM/AGENT SPEC: ABNORMAL
MONOCYTES NFR BLD: 0.62 K/UL (ref 0.1–1.2)
MONOCYTES NFR BLD: 8 % (ref 3–12)
NEUTROPHILS NFR BLD: 66 % (ref 36–65)
NEUTS SEG NFR BLD: 5.4 K/UL (ref 1.5–8.1)
NRBC BLD-RTO: 0 PER 100 WBC
PLATELET # BLD AUTO: 367 K/UL (ref 138–453)
PMV BLD AUTO: 8.4 FL (ref 8.1–13.5)
POTASSIUM SERPL-SCNC: 4 MMOL/L (ref 3.7–5.3)
RBC # BLD AUTO: 3.59 M/UL (ref 4.21–5.77)
RSV RNA NPH QL NAA+NON-PROBE: NOT DETECTED
RV+EV RNA NPH QL NAA+NON-PROBE: NOT DETECTED
S PNEUM AG SPEC QL: NEGATIVE
SARS-COV-2 RNA NPH QL NAA+NON-PROBE: NOT DETECTED
SODIUM SERPL-SCNC: 133 MMOL/L (ref 135–144)
SPECIMEN DESCRIPTION: ABNORMAL
SPECIMEN DESCRIPTION: NORMAL
SPECIMEN SOURCE: NORMAL
WBC OTHER # BLD: 8.2 K/UL (ref 3.5–11.3)

## 2023-09-15 PROCEDURE — 94761 N-INVAS EAR/PLS OXIMETRY MLT: CPT

## 2023-09-15 PROCEDURE — 1200000000 HC SEMI PRIVATE

## 2023-09-15 PROCEDURE — 6370000000 HC RX 637 (ALT 250 FOR IP): Performed by: STUDENT IN AN ORGANIZED HEALTH CARE EDUCATION/TRAINING PROGRAM

## 2023-09-15 PROCEDURE — 2580000003 HC RX 258: Performed by: STUDENT IN AN ORGANIZED HEALTH CARE EDUCATION/TRAINING PROGRAM

## 2023-09-15 PROCEDURE — 87070 CULTURE OTHR SPECIMN AEROBIC: CPT

## 2023-09-15 PROCEDURE — 80048 BASIC METABOLIC PNL TOTAL CA: CPT

## 2023-09-15 PROCEDURE — 6370000000 HC RX 637 (ALT 250 FOR IP): Performed by: INTERNAL MEDICINE

## 2023-09-15 PROCEDURE — 6360000002 HC RX W HCPCS: Performed by: STUDENT IN AN ORGANIZED HEALTH CARE EDUCATION/TRAINING PROGRAM

## 2023-09-15 PROCEDURE — 94640 AIRWAY INHALATION TREATMENT: CPT

## 2023-09-15 PROCEDURE — 87205 SMEAR GRAM STAIN: CPT

## 2023-09-15 PROCEDURE — 36415 COLL VENOUS BLD VENIPUNCTURE: CPT

## 2023-09-15 PROCEDURE — 85025 COMPLETE CBC W/AUTO DIFF WBC: CPT

## 2023-09-15 PROCEDURE — 2700000000 HC OXYGEN THERAPY PER DAY

## 2023-09-15 PROCEDURE — 0202U NFCT DS 22 TRGT SARS-COV-2: CPT

## 2023-09-15 PROCEDURE — 99232 SBSQ HOSP IP/OBS MODERATE 35: CPT | Performed by: INTERNAL MEDICINE

## 2023-09-15 PROCEDURE — 83605 ASSAY OF LACTIC ACID: CPT

## 2023-09-15 RX ORDER — OXYCODONE HYDROCHLORIDE AND ACETAMINOPHEN 5; 325 MG/1; MG/1
2 TABLET ORAL EVERY 8 HOURS PRN
Status: DISCONTINUED | OUTPATIENT
Start: 2023-09-15 | End: 2023-09-16 | Stop reason: HOSPADM

## 2023-09-15 RX ORDER — CEFDINIR 300 MG/1
300 CAPSULE ORAL EVERY 12 HOURS SCHEDULED
Status: DISCONTINUED | OUTPATIENT
Start: 2023-09-15 | End: 2023-09-16 | Stop reason: HOSPADM

## 2023-09-15 RX ORDER — AZITHROMYCIN 250 MG/1
500 TABLET, FILM COATED ORAL DAILY
Status: DISCONTINUED | OUTPATIENT
Start: 2023-09-16 | End: 2023-09-16 | Stop reason: HOSPADM

## 2023-09-15 RX ORDER — AZITHROMYCIN 250 MG/1
500 TABLET, FILM COATED ORAL DAILY
Status: DISCONTINUED | OUTPATIENT
Start: 2023-09-15 | End: 2023-09-15

## 2023-09-15 RX ORDER — IPRATROPIUM BROMIDE AND ALBUTEROL SULFATE 2.5; .5 MG/3ML; MG/3ML
1 SOLUTION RESPIRATORY (INHALATION)
Status: DISCONTINUED | OUTPATIENT
Start: 2023-09-15 | End: 2023-09-16 | Stop reason: HOSPADM

## 2023-09-15 RX ADMIN — OXYCODONE AND ACETAMINOPHEN 1 TABLET: 5; 325 TABLET ORAL at 04:15

## 2023-09-15 RX ADMIN — PANTOPRAZOLE SODIUM 40 MG: 40 TABLET, DELAYED RELEASE ORAL at 08:09

## 2023-09-15 RX ADMIN — Medication 100 MG: at 08:09

## 2023-09-15 RX ADMIN — SODIUM CHLORIDE, PRESERVATIVE FREE 10 ML: 5 INJECTION INTRAVENOUS at 08:10

## 2023-09-15 RX ADMIN — METOCLOPRAMIDE 10 MG: 10 TABLET ORAL at 11:52

## 2023-09-15 RX ADMIN — OXYCODONE HYDROCHLORIDE AND ACETAMINOPHEN 2 TABLET: 5; 325 TABLET ORAL at 20:06

## 2023-09-15 RX ADMIN — METOCLOPRAMIDE 10 MG: 10 TABLET ORAL at 16:51

## 2023-09-15 RX ADMIN — APIXABAN 5 MG: 5 TABLET, FILM COATED ORAL at 08:09

## 2023-09-15 RX ADMIN — SODIUM CHLORIDE: 9 INJECTION, SOLUTION INTRAVENOUS at 09:14

## 2023-09-15 RX ADMIN — IPRATROPIUM BROMIDE AND ALBUTEROL SULFATE 1 DOSE: 2.5; .5 SOLUTION RESPIRATORY (INHALATION) at 16:21

## 2023-09-15 RX ADMIN — AZITHROMYCIN MONOHYDRATE 500 MG: 500 INJECTION, POWDER, LYOPHILIZED, FOR SOLUTION INTRAVENOUS at 18:01

## 2023-09-15 RX ADMIN — IPRATROPIUM BROMIDE AND ALBUTEROL SULFATE 1 DOSE: 2.5; .5 SOLUTION RESPIRATORY (INHALATION) at 08:21

## 2023-09-15 RX ADMIN — METOCLOPRAMIDE 10 MG: 10 TABLET ORAL at 08:09

## 2023-09-15 RX ADMIN — CEFDINIR 300 MG: 300 CAPSULE ORAL at 20:06

## 2023-09-15 RX ADMIN — CEFTRIAXONE SODIUM 1000 MG: 10 INJECTION, POWDER, FOR SOLUTION INTRAVENOUS at 17:53

## 2023-09-15 RX ADMIN — APIXABAN 5 MG: 5 TABLET, FILM COATED ORAL at 20:06

## 2023-09-15 RX ADMIN — IPRATROPIUM BROMIDE AND ALBUTEROL SULFATE 1 DOSE: 2.5; .5 SOLUTION RESPIRATORY (INHALATION) at 20:38

## 2023-09-15 RX ADMIN — FOLIC ACID 1 MG: 1 TABLET ORAL at 08:09

## 2023-09-15 RX ADMIN — PANTOPRAZOLE SODIUM 40 MG: 40 TABLET, DELAYED RELEASE ORAL at 15:35

## 2023-09-15 RX ADMIN — IPRATROPIUM BROMIDE AND ALBUTEROL SULFATE 1 DOSE: 2.5; .5 SOLUTION RESPIRATORY (INHALATION) at 11:20

## 2023-09-15 ASSESSMENT — PAIN - FUNCTIONAL ASSESSMENT: PAIN_FUNCTIONAL_ASSESSMENT: ACTIVITIES ARE NOT PREVENTED

## 2023-09-15 ASSESSMENT — PAIN DESCRIPTION - ORIENTATION
ORIENTATION: UPPER
ORIENTATION: UPPER

## 2023-09-15 ASSESSMENT — PAIN DESCRIPTION - DESCRIPTORS
DESCRIPTORS: ACHING;SORE
DESCRIPTORS: THROBBING
DESCRIPTORS: THROBBING

## 2023-09-15 ASSESSMENT — PAIN DESCRIPTION - LOCATION
LOCATION: ABDOMEN

## 2023-09-15 ASSESSMENT — PAIN DESCRIPTION - PAIN TYPE: TYPE: ACUTE PAIN

## 2023-09-15 ASSESSMENT — PAIN DESCRIPTION - FREQUENCY: FREQUENCY: INTERMITTENT

## 2023-09-15 ASSESSMENT — PAIN DESCRIPTION - ONSET: ONSET: ON-GOING

## 2023-09-15 ASSESSMENT — PAIN SCALES - GENERAL
PAINLEVEL_OUTOF10: 8
PAINLEVEL_OUTOF10: 10

## 2023-09-15 NOTE — H&P
[facility]  Office: [address], DO, Kerry Ba, DO, Victoriano Ruth, DO, Glendell Bence Blood, DO, Ashu De Leon MD, Prema Stanton MD, Jd Domingo MD, Kalie Heller MD,  Trini Celeste MD, Cierra Acuna MD, Pura Javier, DO, Stanford Curiel MD,  Daija Yoo MD, Jamir Ortiz MD, Italia Macdonald, DO, Willow Snellen, MD, Gerry Mcdonald MD, Fabrizio Valderrama DO, Lovely Mckinney MD, Arthur Almonte MD, Madelyn Kwong MD, Mikala Davila MD,  Shawn Rodríguez DO, Love Jensen MD,  Hodan Talbot, CNP,  Connor Hooks, CNP, Keiko Sexton, CNP, Xavier White, CNP,  Jenelle Kolb, DNP, Venkat Enriquez, CNP, Meredith Bermudez, CNP, Benita Fitch, CNP, Doristine Olszewski, CNP, Audie Quintanilla, CNP, Eduardo Bartlettpool, PAMadelaineC, Kris Henry, CNS, Aram Adams, CNP, Yohannes Jiang, CNP         [facility]   [address] [facility]    HISTORY AND PHYSICAL EXAMINATION            Date:   9/14/2023  Patient name:  Sammye Saint  Date of admission:  9/14/2023  2:31 PM  MRN:   [id number]  Account:  [de-identified]  YOB: 1965  PCP:    No primary care provider on file. Room:   [facility]  Code Status:    Full Code    Chief Complaint:     Chief Complaint   Patient presents with    Abdominal Pain       History Obtained From:     patient    History of Present Illness:     Sammye Saint is a 66 y.o. Non- / non  male who presents with Abdominal Pain   and is admitted to the hospital for the management of Community acquired pneumonia of right middle lobe of lung.     61-year-old male with history of metastatic adenocarcinoma with pancreatic biliary origin on chemotherapy, obstructive jaundice s/p tenting, left-sided segmental PE on Eliquis GERD, history of hemicolectomy with anastomosis, chronic pancreatitis, recent admission in August for abdominal pain and was noted to be constipated due to opioid presented with chief complaint of chest

## 2023-09-15 NOTE — PLAN OF CARE
Problem: Pain  Goal: Verbalizes/displays adequate comfort level or baseline comfort level  Outcome: Progressing     Problem: Pain  Goal: Verbalizes/displays adequate comfort level or baseline comfort level  Outcome: Progressing

## 2023-09-15 NOTE — PLAN OF CARE
Problem: Respiratory - Adult  Goal: Achieves optimal ventilation and oxygenation  Outcome: Progressing   BRONCHOSPASM/BRONCHOCONSTRICTION     [x]         IMPROVE AERATION/BREATH SOUNDS  [x]   ADMINISTER BRONCHODILATOR THERAPY AS APPROPRIATE  [x]   ASSESS BREATH SOUNDS  [x]   IMPLEMENT AEROSOL/MDI PROTOCOL  [x]   PATIENT EDUCATION AS NEEDED   PROVIDE ADEQUATE OXYGENATION WITH ACCEPTABLE SP02/ABG'S    [x]  IDENTIFY APPROPRIATE OXYGEN THERAPY  [x]   MONITOR SP02/ABG'S AS NEEDED   [x]   PATIENT EDUCATION AS NEEDED

## 2023-09-15 NOTE — CARE COORDINATION
09/15/23 1715   Readmission Assessment   Number of Days since last admission? 8-30 days   Previous Disposition Other (comment)  (shelter)   Who is being Interviewed Patient   What was the patient's/caregiver's perception as to why they think they needed to return back to the hospital? Other (Comment)  (symptoms)   Did you visit your Primary Care Physician after you left the hospital, before you returned this time? No   Why weren't you able to visit your PCP? Did not have an appointment; Other (Comment)  (does not have PCP, only oncologist)   Did you see a specialist, such as Cardiac, Pulmonary, Orthopedic Physician, etc. after you left the hospital? No   Who advised the patient to return to the hospital? [facility] Staff   Does the patient report anything that got in the way of taking their medications? No   In our efforts to provide the best possible care to you and others like you, can you think of anything that we could have done to help you after you left the hospital the first time, so that you might not have needed to return so soon?  Additional Community resources available for illness support
Transitional planning: Attempt to see patient for initial assessment. Patient in shower. Will try again later as time allows.
shares the quality data associated with the providers was provided to: (P) Patient   Patient Representative Name:       The Patient and/or Patient Representative Agree with the Discharge Plan?  (P) Yes    Jae Moreno RN  Case Management Department  Ph: [phone] Fax: [phone]

## 2023-09-16 VITALS
DIASTOLIC BLOOD PRESSURE: 67 MMHG | TEMPERATURE: 98.2 F | WEIGHT: 143.6 LBS | HEART RATE: 97 BPM | RESPIRATION RATE: 16 BRPM | OXYGEN SATURATION: 98 % | BODY MASS INDEX: 21.27 KG/M2 | HEIGHT: 69 IN | SYSTOLIC BLOOD PRESSURE: 101 MMHG

## 2023-09-16 LAB
ANION GAP SERPL CALCULATED.3IONS-SCNC: 8 MMOL/L (ref 9–17)
BASOPHILS # BLD: <0.03 K/UL (ref 0–0.2)
BASOPHILS NFR BLD: 0 % (ref 0–2)
BUN SERPL-MCNC: 9 MG/DL (ref 6–20)
CALCIUM SERPL-MCNC: 8.4 MG/DL (ref 8.6–10.4)
CHLORIDE SERPL-SCNC: 108 MMOL/L (ref 98–107)
CO2 SERPL-SCNC: 23 MMOL/L (ref 20–31)
CREAT SERPL-MCNC: 0.5 MG/DL (ref 0.7–1.2)
EKG ATRIAL RATE: 109 BPM
EKG P AXIS: 81 DEGREES
EKG P-R INTERVAL: 132 MS
EKG Q-T INTERVAL: 320 MS
EKG QRS DURATION: 80 MS
EKG QTC CALCULATION (BAZETT): 430 MS
EKG R AXIS: 91 DEGREES
EKG T AXIS: 48 DEGREES
EKG VENTRICULAR RATE: 109 BPM
EOSINOPHIL # BLD: 0.16 K/UL (ref 0–0.44)
EOSINOPHILS RELATIVE PERCENT: 2 % (ref 1–4)
ERYTHROCYTE [DISTWIDTH] IN BLOOD BY AUTOMATED COUNT: 14.2 % (ref 11.8–14.4)
GFR SERPL CREATININE-BSD FRML MDRD: >60 ML/MIN/1.73M2
GLUCOSE SERPL-MCNC: 123 MG/DL (ref 70–99)
HCT VFR BLD AUTO: 32 % (ref 40.7–50.3)
HGB BLD-MCNC: 10.1 G/DL (ref 13–17)
IMM GRANULOCYTES # BLD AUTO: 0.03 K/UL (ref 0–0.3)
IMM GRANULOCYTES NFR BLD: 0 %
LYMPHOCYTES NFR BLD: 1.82 K/UL (ref 1.1–3.7)
LYMPHOCYTES RELATIVE PERCENT: 25 % (ref 24–43)
MCH RBC QN AUTO: 28.5 PG (ref 25.2–33.5)
MCHC RBC AUTO-ENTMCNC: 31.6 G/DL (ref 28.4–34.8)
MCV RBC AUTO: 90.1 FL (ref 82.6–102.9)
MONOCYTES NFR BLD: 0.67 K/UL (ref 0.1–1.2)
MONOCYTES NFR BLD: 9 % (ref 3–12)
NEUTROPHILS NFR BLD: 64 % (ref 36–65)
NEUTS SEG NFR BLD: 4.58 K/UL (ref 1.5–8.1)
NRBC BLD-RTO: 0 PER 100 WBC
PLATELET # BLD AUTO: 390 K/UL (ref 138–453)
PMV BLD AUTO: 8.7 FL (ref 8.1–13.5)
POTASSIUM SERPL-SCNC: 3.6 MMOL/L (ref 3.7–5.3)
RBC # BLD AUTO: 3.55 M/UL (ref 4.21–5.77)
SODIUM SERPL-SCNC: 139 MMOL/L (ref 135–144)
WBC OTHER # BLD: 7.3 K/UL (ref 3.5–11.3)

## 2023-09-16 PROCEDURE — 6370000000 HC RX 637 (ALT 250 FOR IP): Performed by: INTERNAL MEDICINE

## 2023-09-16 PROCEDURE — 94760 N-INVAS EAR/PLS OXIMETRY 1: CPT

## 2023-09-16 PROCEDURE — 36415 COLL VENOUS BLD VENIPUNCTURE: CPT

## 2023-09-16 PROCEDURE — 2580000003 HC RX 258: Performed by: STUDENT IN AN ORGANIZED HEALTH CARE EDUCATION/TRAINING PROGRAM

## 2023-09-16 PROCEDURE — 80048 BASIC METABOLIC PNL TOTAL CA: CPT

## 2023-09-16 PROCEDURE — 99238 HOSP IP/OBS DSCHRG MGMT 30/<: CPT | Performed by: INTERNAL MEDICINE

## 2023-09-16 PROCEDURE — 6370000000 HC RX 637 (ALT 250 FOR IP): Performed by: STUDENT IN AN ORGANIZED HEALTH CARE EDUCATION/TRAINING PROGRAM

## 2023-09-16 PROCEDURE — 94640 AIRWAY INHALATION TREATMENT: CPT

## 2023-09-16 PROCEDURE — 85025 COMPLETE CBC W/AUTO DIFF WBC: CPT

## 2023-09-16 RX ORDER — CEFDINIR 300 MG/1
300 CAPSULE ORAL EVERY 12 HOURS SCHEDULED
Qty: 12 CAPSULE | Refills: 0 | Status: SHIPPED | OUTPATIENT
Start: 2023-09-16 | End: 2023-09-22

## 2023-09-16 RX ORDER — OXYCODONE HYDROCHLORIDE AND ACETAMINOPHEN 5; 325 MG/1; MG/1
2 TABLET ORAL EVERY 8 HOURS PRN
Qty: 30 TABLET | Refills: 0 | Status: SHIPPED | OUTPATIENT
Start: 2023-09-16 | End: 2023-09-21

## 2023-09-16 RX ORDER — AZITHROMYCIN 500 MG/1
500 TABLET, FILM COATED ORAL DAILY
Qty: 6 TABLET | Refills: 0 | Status: SHIPPED | OUTPATIENT
Start: 2023-09-17 | End: 2023-09-23

## 2023-09-16 RX ADMIN — AZITHROMYCIN 500 MG: 250 TABLET, FILM COATED ORAL at 08:31

## 2023-09-16 RX ADMIN — IPRATROPIUM BROMIDE AND ALBUTEROL SULFATE 1 DOSE: 2.5; .5 SOLUTION RESPIRATORY (INHALATION) at 08:00

## 2023-09-16 RX ADMIN — FOLIC ACID 1 MG: 1 TABLET ORAL at 08:31

## 2023-09-16 RX ADMIN — Medication 100 MG: at 08:32

## 2023-09-16 RX ADMIN — OXYCODONE HYDROCHLORIDE AND ACETAMINOPHEN 2 TABLET: 5; 325 TABLET ORAL at 04:24

## 2023-09-16 RX ADMIN — PANTOPRAZOLE SODIUM 40 MG: 40 TABLET, DELAYED RELEASE ORAL at 05:38

## 2023-09-16 RX ADMIN — APIXABAN 5 MG: 5 TABLET, FILM COATED ORAL at 08:32

## 2023-09-16 RX ADMIN — CEFDINIR 300 MG: 300 CAPSULE ORAL at 08:32

## 2023-09-16 RX ADMIN — SODIUM CHLORIDE, PRESERVATIVE FREE 10 ML: 5 INJECTION INTRAVENOUS at 08:31

## 2023-09-16 ASSESSMENT — PAIN SCALES - GENERAL
PAINLEVEL_OUTOF10: 8
PAINLEVEL_OUTOF10: 0
PAINLEVEL_OUTOF10: 0

## 2023-09-16 ASSESSMENT — PAIN DESCRIPTION - LOCATION: LOCATION: ABDOMEN

## 2023-09-16 NOTE — PLAN OF CARE
Problem: Pain  Goal: Verbalizes/displays adequate comfort level or baseline comfort level  9/16/2023 0522 by Carrington Alaniz RN  Outcome: Progressing  9/15/2023 1932 by Jayshree Nieves RN  Outcome: Progressing     Problem: ABCDS Injury Assessment  Goal: Absence of physical injury  9/16/2023 0522 by Carrington Alaniz RN  Outcome: Progressing  9/15/2023 1932 by Jayshree Nieves RN  Outcome: Progressing     Problem: Respiratory - Adult  Goal: Achieves optimal ventilation and oxygenation  9/16/2023 0522 by Carrington Alaniz RN  Outcome: Progressing  9/15/2023 1932 by Jayshree Nieves RN  Outcome: Progressing     Problem: Safety - Adult  Goal: Free from fall injury  9/16/2023 0522 by Carrington Alaniz RN  Outcome: Progressing  9/15/2023 1932 by Jayshree Nieves RN  Outcome: Progressing

## 2023-09-16 NOTE — PLAN OF CARE
Problem: Pain  Goal: Verbalizes/displays adequate comfort level or baseline comfort level  9/16/2023 1256 by Gabriela Olmos RN  Outcome: Adequate for Discharge  9/16/2023 0522 by Leni Urbano RN  Outcome: Progressing     Problem: ABCDS Injury Assessment  Goal: Absence of physical injury  9/16/2023 1256 by Gabriela Olmos RN  Outcome: Adequate for Discharge  9/16/2023 0522 by Leni Urbano RN  Outcome: Progressing     Problem: Respiratory - Adult  Goal: Achieves optimal ventilation and oxygenation  9/16/2023 1256 by Gabriela Olmos RN  Outcome: Adequate for Discharge  9/16/2023 0805 by Cele Hernandez RCP  Flowsheets (Taken 9/16/2023 0805)  Achieves optimal ventilation and oxygenation: Respiratory therapy support as indicated  9/16/2023 0522 by Leni Urbano RN  Outcome: Progressing     Problem: Safety - Adult  Goal: Free from fall injury  9/16/2023 1256 by Gabriela Olmos RN  Outcome: Adequate for Discharge  9/16/2023 0522 by Leni Urbano RN  Outcome: Progressing

## 2023-09-16 NOTE — PROGRESS NOTES
Comprehensive Nutrition Assessment    Type and Reason for Visit:  Consult (Poor PO x 5 days)    Nutrition Recommendations/Plan:   Continue current diet, Regular  Will send high kcal/high PRO ONS TID (no chocolate flavor)  Monitor/encourage PO intake  Pt meets ASPEN criteria for malnutrition, dx noted. Malnutrition Assessment:  Malnutrition Status: Moderate malnutrition (09/15/23 1043)    Context:  Chronic Illness     Findings of the 6 clinical characteristics of malnutrition:  Energy Intake:  75% or less estimated energy requirements for 1 month or longer  Weight Loss:  Greater than 7.5% over 3 months     Body Fat Loss:  Unable to assess     Muscle Mass Loss:  Unable to assess    Fluid Accumulation:  No significant fluid accumulation     Strength:  Not Performed    Nutrition Assessment:    65 yo M adm pneumonia. PMH significant for metastatic adenocarcinoma of pancreatic biliary origin, GERD, pancreatitis, moderate-malnutrition (8/16). Pt known to [address] from previous admissions. Pt typically eats 1-2 meals daily at shelter, plus Ensure from [facility] as available. Pt known to prefer  Ensure in vanilla/strawberry flavor. Per chart review, 10% wt loss x 3 mos (significant). No BM noted. No edema noted. Nutrition Related Findings:    labs/meds Wound Type: None       Current Nutrition Intake & Therapies:    Average Meal Intake: Unable to assess  Average Supplements Intake: None Ordered  ADULT DIET; Regular  ADULT ORAL NUTRITION SUPPLEMENT; Breakfast, Lunch, Dinner; Standard High Calorie/High Protein Oral Supplement    Anthropometric Measures:  Height: 5' 9\" (175.3 cm)  Ideal Body Weight (IBW): 160 lbs (73 kg)    Admission Body Weight: 143 lb 9.6 oz (65.1 kg) (9/14)  Current Body Weight: 143 lb 9.6 oz (65.1 kg) (9/14), 89.8 % IBW.     Current BMI (kg/m2): 21.2  Usual Body Weight: 160 lb (72.6 kg) (6/19/23)  % Weight Change (Calculated): -10.3  Weight Adjustment For: No Adjustment
Discussed with the patient and all questioned fully answered. He will call me if any problems arise.
Occupational [address]  Occupational Therapy Not Seen Note    DATE: 9/15/2023    NAME: Moshe Taylor  MRN: [id number]   : 1965      Patient not seen this date for Occupational Therapy due to:    Patient independent with ADLs and functional tasks with no acute OT needs. Will defer OT evaluation at this time. Please reorder OT if future needs arise. Pt reports independence with shower earlier this date.     Electronically signed by NILA Mathur on 9/15/2023 at 2:03 PM
Physical Therapy        Physical Therapy Cancel Note      DATE: 9/15/2023    NAME: Kelsi Osborn  MRN: [id number]   : 1965      Patient not seen this date for Physical Therapy due to:    Defer eval. Discussed with nurse and patient. Patient is independent. Has no difficulty with gait or balance. DC PT.       Electronically signed by Haile Fernandez PT on 9/15/2023 at 11:56 AM
Only had one session.  -Lfts wnml, plt and hgb in acceptable range, denies abdominal pain or bone pain. Has chronic pain for which hes on 10mg percocet TID with stool aid.   -next chemo in 2 weeks at [facility]  -continue analgesia, monitor bowl moements    #PE  -left subsegmental PE seen in past, has been on elqiuis 5mg bid since. No distress, on room air. No signs of right heart strain.  Trops negative.  -no signs of bleeding, renal functio wnml  -resume home meds, monitor    DC tomorrow with daughter, no social work needs

## 2023-09-16 NOTE — PLAN OF CARE
Problem: Respiratory - Adult  Goal: Achieves optimal ventilation and oxygenation  9/16/2023 0805 by Cele Hernandez RCP  Flowsheets (Taken 9/16/2023 0805)  Achieves optimal ventilation and oxygenation: Respiratory therapy support as indicated

## 2023-09-16 NOTE — DISCHARGE SUMMARY
Discharge Summary    Date: 9/16/2023  Patient Name: Herlinda Pleitez    YOB: 1965     Age: 66 y.o. Admit Date: 9/14/2023  Discharge Date: 9/16/2023  Discharge Condition: Stable    Admission Diagnosis  Septicemia ([address]) [A41.9]; Hypoxia [R09.02]; Pneumonia of right lower lobe due to infectious organism [J18.9];CAP (community acquired pneumonia) due to Chlamydia species [J16.0]      Discharge Diagnosis  Principal Problem:    Community acquired pneumonia of right middle lobe of lung  Active Problems:    Schizophrenia ([address])    Sepsis ([address])    Tachycardia    Acute respiratory failure with hypoxia ([address])    Pancreatic cancer metastasized to liver (HCC)    Anemia    Chest pain    Dizziness    Dyspnea    Severe malnutrition (HCC)    Productive cough    Fever    History of pulmonary embolus (PE)    History of COPD    Lactic acidosis  Resolved Problems:    * No resolved hospital problems. [facility] Stay  Narrative of Hospital Course:  65 yo male with Underlying pancreatic cancer recently started on chemo via chest port. S/p  biliary stent 2.5 months ago, s/p biliary resection due to ulcer on chronic pain meds who presented with pleuritc cp and scant cough for 3 days. Found to have walking PNA with rihgt lobar infiltrate. Received abx for day and had robust response. Patient dc home with daughter as they head back to [place] to continue mgt at [address] out there. He has been transitioned to oral abx for walking pna. He will call and reschedule chemo until after infection clears nd labs are in within normal range     Consultants:  IP CONSULT TO HOSPITALIST  IP CONSULT TO DIETITIAN    Surgeries/procedures Performed:      Treatments:    Antibiotics        Discharge Plan/Disposition:  Home    Hospital/Incidental Findings Requiring Follow Up:    Patient Instructions:    Diet: Regular Diet    Activity:Activity as Tolerated  For number of days (if applicable):       Other Instructions:    Provider Follow-Up:   No adenocarcinoma (HCC)    lidocaine-prilocaine (EMLA) 2.5-2.5 % cream  Apply topically to port as needed prior to treatment. Qty: 30 g Refills: 2  Associated Diagnoses:Pancreatic adenocarcinoma (HCC)    vitamin B-1 (THIAMINE) 100 MG tablet  Take 1 tablet by mouth daily    apixaban (ELIQUIS) 5 MG TABS tablet  Take 1 tablet by mouth 2 times daily  Qty: 60 tablet Refills: 0    metoclopramide (REGLAN) 10 MG tablet  Take 1 tablet by mouth 4 times daily (before meals and nightly)  Qty: 120 tablet Refills: 0    pantoprazole (PROTONIX) 40 MG tablet  Take 1 tablet by mouth 2 times daily (before meals)  Qty: 60 tablet Refills: 0    folic acid (FOLVITE) 1 MG tablet  Take 1 tablet by mouth daily  Qty: 30 tablet Refills: 3          Current Discharge Medication List        Time Spent on Discharge:  20 minutes were spent in patient examination, evaluation, counseling as well as medication reconciliation, prescriptions for required medications, discharge plan, and follow up.     Electronically signed by Shanthi Grissom MD on 9/16/23 at 9:21 AM EDT

## 2023-09-17 LAB
MICROORGANISM SPEC CULT: ABNORMAL
MICROORGANISM SPEC CULT: NORMAL
MICROORGANISM SPEC CULT: NORMAL
MICROORGANISM/AGENT SPEC: ABNORMAL
SERVICE CMNT-IMP: NORMAL
SERVICE CMNT-IMP: NORMAL
SPECIMEN DESCRIPTION: ABNORMAL
SPECIMEN DESCRIPTION: NORMAL
SPECIMEN DESCRIPTION: NORMAL

## 2023-09-18 ENCOUNTER — HOSPITAL ENCOUNTER (OUTPATIENT)
Facility: MEDICAL CENTER | Age: 58
End: 2023-09-18

## 2023-09-18 ENCOUNTER — TELEPHONE (OUTPATIENT)
Dept: ONCOLOGY | Age: 58
End: 2023-09-18

## 2023-09-18 NOTE — TELEPHONE ENCOUNTER
Name: Javier Hector  : 1965  MRN: [id number]    Oncology Navigation Follow-Up Note    Contact Type:  Telephone    Notes: Spoke w/Ambar, pt's daughter, to inquire on pt. Isaak Bonilla stated pt currently living w/her in [place]. Isaak Bonilla stated pt scheduled for consultation w/[address] in [place]. Per Isaak Bonilla referral received without records. Isaak Bonilla requested records faxed to [phone]. Instructed Kishore Orr will update Juan Francisco Brandt., [address] [facility] oncology, & request records faxed. Jean Pierre Sutton may contact writer prn.       Electronically signed by Hadley Cruz RN on 2023 at 4:37 PM

## 2023-09-19 ENCOUNTER — TELEPHONE (OUTPATIENT)
Dept: ONCOLOGY | Age: 58
End: 2023-09-19

## 2023-09-19 ENCOUNTER — CLINICAL DOCUMENTATION (OUTPATIENT)
Dept: ONCOLOGY | Age: 58
End: 2023-09-19

## 2023-09-19 ENCOUNTER — HOSPITAL ENCOUNTER (OUTPATIENT)
Dept: INFUSION THERAPY | Facility: MEDICAL CENTER | Age: 58
End: 2023-09-19

## 2023-09-19 LAB
MICROORGANISM SPEC CULT: NORMAL
MICROORGANISM SPEC CULT: NORMAL
SERVICE CMNT-IMP: NORMAL
SERVICE CMNT-IMP: NORMAL
SPECIMEN DESCRIPTION: NORMAL
SPECIMEN DESCRIPTION: NORMAL

## 2023-09-19 NOTE — PROGRESS NOTES
[facility] Oncology Nutrition Note:   Chart reviewed for nutrition follow-up. Noted patient has relocated to [place] and will be receiving care there. Nutrition Services will sign off at this time.

## 2023-09-19 NOTE — TELEPHONE ENCOUNTER
Name: Jennifer Peralta  : 1965  MRN: [id number]    Oncology Navigation Follow-Up Note    Contact Type:  Telephone    Notes: Upon review of chart noted records faxed to [facility]. Navigation ended.     Electronically signed by Cordelia Maher RN on 2023 at 10:43 AM

## 2023-09-20 ENCOUNTER — TELEPHONE (OUTPATIENT)
Dept: ONCOLOGY | Age: 58
End: 2023-09-20

## 2024-06-04 NOTE — PLAN OF CARE
Anesthesia Pre Eval Note    Anesthesia ROS/Med Hx        Anesthetic Complication History:    Patient does not have a history of anesthetic complications      Pulmonary Review:    Positive for sleep apnea     Neuro/Psych Review:  Patient does not have a neuro/psych history         Cardiovascular Review:     Positive for hypertension  Positive for hyperlipidemia    GI/HEPATIC/RENAL Review:  Patient does not have a GI/hepatic/renalhistory       End/Other Review:  Positive for diabetes  Positive for obesity   Positive for anemia      Relevant Problems   Anesthesia Problems   (+) AYSHA (obstructive sleep apnea)       Physical Exam     Airway   Mallampati: II  TM Distance: >3 FB  Neck ROM: Full  Neck: Non-tender and Able to place in sniff position  TMJ Mobility: Good    Cardiovascular  Cardiovascular exam normal  Cardio Rhythm: Regular  Cardio Rate: Normal    Head Assessment  Head assessment: Normocephalic and Atraumatic    General Assessment  General Assessment: Alert and oriented and No acute distress    Dental Exam  Dental exam normal    Pulmonary Exam  Pulmonary exam normal  Breath sounds clear to auscultation:  Yes    Abdominal Exam  Abdominal exam normal      Anesthesia Plan:    ASA Status: 3  Anesthesia Type: MAC    Induction: Intravenous  Preferred Airway Type: Nasal Cannula  Patient does not have a difficult airway or is not at risk of aspiration.   Maintenance: TIVA    Post-op Pain Management: Per Surgeon      Checklist  Reviewed: NPO Status, Allergies, Medications, Problem list, Past Med History and Patient Summary  Consent/Risks Discussed Statement:  The proposed anesthetic plan, including its risks and benefits, have been discussed with the Patient along with the risks and benefits of alternatives. Questions were encouraged and answered and the patient and/or representative understands and agrees to proceed.        I discussed with the patient (and/or patient's legal representative) the risks and benefits of  Problem: Discharge Planning  Goal: Discharge to home or other facility with appropriate resources  Flowsheets (Taken 8/7/2023 1837 by Kae Nicole, RN)  Discharge to home or other facility with appropriate resources:   Identify barriers to discharge with patient and caregiver   Arrange for needed discharge resources and transportation as appropriate   Identify discharge learning needs (meds, wound care, etc)     Problem: Discharge Planning  Goal: Discharge to home or other facility with appropriate resources  Flowsheets (Taken 8/7/2023 1837 by Kae Nicole, RN)  Discharge to home or other facility with appropriate resources:   Identify barriers to discharge with patient and caregiver   Arrange for needed discharge resources and transportation as appropriate   Identify discharge learning needs (meds, wound care, etc)     Problem: Pain  Goal: Verbalizes/displays adequate comfort level or baseline comfort level  Flowsheets (Taken 8/6/2023 1200 by Jacob Ortiz RN)  Verbalizes/displays adequate comfort level or baseline comfort level:   Encourage patient to monitor pain and request assistance   Assess pain using appropriate pain scale   Administer analgesics based on type and severity of pain and evaluate response     Problem: ABCDS Injury Assessment  Goal: Absence of physical injury  Flowsheets (Taken 8/7/2023 1837 by Kae Nicole, RN)  Absence of Physical Injury: Implement safety measures based on patient assessment     Problem: Safety - Adult  Goal: Free from fall injury  Flowsheets (Taken 8/7/2023 1837 by Kae Nicole, RN)  Free From Fall Injury: Instruct family/caregiver on patient safety the proposed anesthesia plan, MAC, which may include services performed by other anesthesia providers.    Alternative anesthesia plans, if available, were reviewed with the patient (and/or patient's legal representative). Discussion has been held with the patient (and/or patient's legal representative) regarding risks of anesthesia, which include  emergent situations that may require change in anesthesia plan.    The patient (and/or patient's legal representative) has indicated understanding, his/her questions have been answered, and he/she wishes to proceed with the planned anesthetic.    Blood Products: Not Anticipated

## (undated) DEVICE — SEALANT TISS 10 CC FIBRIN VISTASEAL

## (undated) DEVICE — STAPLER 60 RELOAD BLUE: Brand: SUREFORM

## (undated) DEVICE — SUTURE VCRL SZ 3-0 L27IN ABSRB UD L26MM SH 1/2 CIR J416H

## (undated) DEVICE — BLADELESS OBTURATOR: Brand: WECK VISTA

## (undated) DEVICE — Device

## (undated) DEVICE — RETRIEVAL BALLOON CATHETER: Brand: EXTRACTOR™ PRO RX

## (undated) DEVICE — GLOVE SURG SZ 65 THK91MIL LTX FREE SYN POLYISOPRENE

## (undated) DEVICE — PAD PT POS 36 IN SURGYPAD DISP

## (undated) DEVICE — ELECTRO LUBE IS A SINGLE PATIENT USE DEVICE THAT IS INTENDED TO BE USED ON ELECTROSURGICAL ELECTRODES TO REDUCE STICKING.: Brand: KEY SURGICAL ELECTRO LUBE

## (undated) DEVICE — 3M™ IOBAN™ 2 ANTIMICROBIAL INCISE DRAPE 6650EZ: Brand: IOBAN™ 2

## (undated) DEVICE — COVER,LIGHT HANDLE,FLX,2/PK: Brand: MEDLINE INDUSTRIES, INC.

## (undated) DEVICE — TIP COVER ACCESSORY

## (undated) DEVICE — AIRSEAL BIFURCATED FILTERED TUBESET WITH ACTIVATED CHARCOAL FILTER: Brand: AIRSEAL

## (undated) DEVICE — SEAL

## (undated) DEVICE — APPLICATOR LAP 45CM FLX 2 VISTASEAL

## (undated) DEVICE — VESSEL SEALER EXTEND: Brand: ENDOWRIST

## (undated) DEVICE — ELECTRODE PT RET AD L9FT HI MOIST COND ADH HYDRGEL CORDED

## (undated) DEVICE — FORCEPS BX L240CM WRK CHN 2.8MM STD CAP W/ NDL MIC MESH

## (undated) DEVICE — PROTECTOR ULN NRV PUR FOAM HK LOOP STRP ANATOMICALLY

## (undated) DEVICE — ARM DRAPE

## (undated) DEVICE — SUTURE V-LOC 180 SZ 3-0 L6IN ABSRB GRN V-20 L26MM 1/2 CIR VLOCL0604

## (undated) DEVICE — STAPLER SHEATH: Brand: ENDOWRIST

## (undated) DEVICE — SOLUTION ANTIFOG VIS SYS CLEARIFY LAPSCP

## (undated) DEVICE — STAPLER 60: Brand: SUREFORM

## (undated) DEVICE — SPHINCTEROTOME: Brand: DREAMTOME™ RX 44

## (undated) DEVICE — TOWEL,OR,DSP,ST,NATURAL,DLX,4/PK,20PK/CS: Brand: MEDLINE

## (undated) DEVICE — SLEEVE COMPRESS STD CALF KNEE SCD

## (undated) DEVICE — INSUFFLATION NEEDLE TO ESTABLISH PNEUMOPERITONEUM.: Brand: INSUFFLATION NEEDLE

## (undated) DEVICE — AIRSEAL 8 MM CANNULA CAP AND OBTURATOR WITH BLADELESS OPTICAL TIP COMPATIBLE WITH INTUITIVE DA VINCI XI AND DA VINCI X 8 MM INSTRUMENT CANNULA, STANDARD LENGTH: Brand: AIRSEAL

## (undated) DEVICE — PAD,NON-ADHERENT,3X8,STERILE,LF,1/PK: Brand: MEDLINE

## (undated) DEVICE — GOWN,AURORA,NONREINFORCED,LARGE: Brand: MEDLINE

## (undated) DEVICE — SUTURE SZ 0 27IN 5/8 CIR UR-6  TAPER PT VIOLET ABSRB VICRYL J603H

## (undated) DEVICE — LIQUIBAND RAPID ADHESIVE 36/CS 0.8ML: Brand: MEDLINE

## (undated) DEVICE — TROCAR ENDOSCP L100MM DIA5MM BLDELSS STBL SL THRD OPT VW

## (undated) DEVICE — BLADE,CARBON-STEEL,15,STRL,DISPOSABLE,TB: Brand: MEDLINE

## (undated) DEVICE — STRAP,POSITIONING,KNEE/BODY,FOAM,4X60": Brand: MEDLINE

## (undated) DEVICE — APPLICATOR MEDICATED 26 CC SOLUTION HI LT ORNG CHLORAPREP

## (undated) DEVICE — SYSTEM BX 25GA FN NDL SIX DST CUT EDG SHARKCORE